# Patient Record
Sex: FEMALE | Race: ASIAN | Employment: UNEMPLOYED | URBAN - METROPOLITAN AREA
[De-identification: names, ages, dates, MRNs, and addresses within clinical notes are randomized per-mention and may not be internally consistent; named-entity substitution may affect disease eponyms.]

---

## 2017-01-03 ENCOUNTER — TELEPHONE (OUTPATIENT)
Dept: FAMILY MEDICINE CLINIC | Facility: CLINIC | Age: 52
End: 2017-01-03

## 2017-01-11 RX ORDER — MONTELUKAST SODIUM 10 MG/1
TABLET ORAL
Qty: 90 TABLET | Refills: 0 | Status: SHIPPED | OUTPATIENT
Start: 2017-01-11 | End: 2017-03-22

## 2017-01-13 NOTE — TELEPHONE ENCOUNTER
PA required for Esomeprazole. Called [phone], [phone]. Representative states no PA required and this med went through insurance 1-8-17. Task completed.

## 2017-02-18 ENCOUNTER — TELEPHONE (OUTPATIENT)
Dept: FAMILY MEDICINE CLINIC | Facility: CLINIC | Age: 52
End: 2017-02-18

## 2017-03-06 ENCOUNTER — HOSPITAL ENCOUNTER (OUTPATIENT)
Dept: GENERAL RADIOLOGY | Age: 52
Discharge: HOME | End: 2017-03-06
Attending: PHYSICIAN ASSISTANT
Payer: COMMERCIAL

## 2017-03-06 ENCOUNTER — LAB ENCOUNTER (OUTPATIENT)
Dept: LAB | Age: 52
End: 2017-03-06
Attending: PHYSICIAN ASSISTANT
Payer: COMMERCIAL

## 2017-03-06 ENCOUNTER — OFFICE VISIT (OUTPATIENT)
Dept: FAMILY MEDICINE CLINIC | Facility: CLINIC | Age: 52
End: 2017-03-06

## 2017-03-06 VITALS
TEMPERATURE: 98 F | DIASTOLIC BLOOD PRESSURE: 60 MMHG | HEIGHT: 65 IN | RESPIRATION RATE: 16 BRPM | BODY MASS INDEX: 28.66 KG/M2 | HEART RATE: 72 BPM | SYSTOLIC BLOOD PRESSURE: 112 MMHG | WEIGHT: 172 LBS

## 2017-03-06 DIAGNOSIS — R51.9 HEADACHE, UNSPECIFIED HEADACHE TYPE: ICD-10-CM

## 2017-03-06 DIAGNOSIS — M79.671 PAIN IN BOTH FEET: ICD-10-CM

## 2017-03-06 DIAGNOSIS — M79.672 PAIN IN BOTH FEET: ICD-10-CM

## 2017-03-06 DIAGNOSIS — Z13.820 SCREENING FOR OSTEOPOROSIS: ICD-10-CM

## 2017-03-06 DIAGNOSIS — N64.4 BREAST PAIN IN FEMALE: ICD-10-CM

## 2017-03-06 DIAGNOSIS — Z01.419 WELL WOMAN EXAM WITH ROUTINE GYNECOLOGICAL EXAM: Primary | ICD-10-CM

## 2017-03-06 DIAGNOSIS — M25.50 ARTHRALGIA, UNSPECIFIED JOINT: ICD-10-CM

## 2017-03-06 DIAGNOSIS — Z12.31 VISIT FOR SCREENING MAMMOGRAM: ICD-10-CM

## 2017-03-06 DIAGNOSIS — E83.40 MAGNESIUM DISORDER: ICD-10-CM

## 2017-03-06 DIAGNOSIS — J45.20 MILD INTERMITTENT ASTHMA WITHOUT COMPLICATION: ICD-10-CM

## 2017-03-06 DIAGNOSIS — Z00.00 LABORATORY EXAM ORDERED AS PART OF ROUTINE GENERAL MEDICAL EXAMINATION: ICD-10-CM

## 2017-03-06 DIAGNOSIS — E03.9 HYPOTHYROIDISM, UNSPECIFIED TYPE: ICD-10-CM

## 2017-03-06 DIAGNOSIS — E55.9 VITAMIN D DEFICIENCY: ICD-10-CM

## 2017-03-06 LAB
25-HYDROXYVITAMIN D (TOTAL): 40.3 NG/ML (ref 30–100)
ALBUMIN SERPL-MCNC: 3.7 G/DL (ref 3.5–4.8)
ALP LIVER SERPL-CCNC: 73 U/L (ref 41–108)
ALT SERPL-CCNC: 22 U/L (ref 14–54)
ANTI-THYROGLOBULIN: <15 U/ML (ref ?–60)
AST SERPL-CCNC: 18 U/L (ref 15–41)
BASOPHILS # BLD AUTO: 0.05 X10(3) UL (ref 0–0.1)
BASOPHILS NFR BLD AUTO: 1 %
BILIRUB SERPL-MCNC: 0.3 MG/DL (ref 0.1–2)
BUN BLD-MCNC: 15 MG/DL (ref 8–20)
C-REACTIVE PROTEIN: <0.29 MG/DL (ref ?–1)
CALCIUM BLD-MCNC: 9.3 MG/DL (ref 8.3–10.3)
CHLORIDE: 107 MMOL/L (ref 101–111)
CHOLEST SMN-MCNC: 177 MG/DL (ref ?–200)
CO2: 28 MMOL/L (ref 22–32)
CREAT BLD-MCNC: 0.78 MG/DL (ref 0.55–1.02)
EOSINOPHIL # BLD AUTO: 0.18 X10(3) UL (ref 0–0.3)
EOSINOPHIL NFR BLD AUTO: 3.5 %
ERYTHROCYTE [DISTWIDTH] IN BLOOD BY AUTOMATED COUNT: 14 % (ref 11.5–16)
FREE T4: 1.3 NG/DL (ref 0.9–1.8)
GLUCOSE BLD-MCNC: 98 MG/DL (ref 70–99)
HAV IGM SER QL: 2.1 MG/DL (ref 1.7–3)
HCT VFR BLD AUTO: 37.5 % (ref 34–50)
HDLC SERPL-MCNC: 61 MG/DL (ref 45–?)
HDLC SERPL: 2.9 {RATIO} (ref ?–4.44)
HGB BLD-MCNC: 12.6 G/DL (ref 12–16)
IMMATURE GRANULOCYTE COUNT: 0.01 X10(3) UL (ref 0–1)
IMMATURE GRANULOCYTE RATIO %: 0.2 %
LDLC SERPL CALC-MCNC: 102 MG/DL (ref ?–130)
LYMPHOCYTES # BLD AUTO: 2.02 X10(3) UL (ref 0.9–4)
LYMPHOCYTES NFR BLD AUTO: 39.7 %
M PROTEIN MFR SERPL ELPH: 7.5 G/DL (ref 6.1–8.3)
MCH RBC QN AUTO: 28.9 PG (ref 27–33.2)
MCHC RBC AUTO-ENTMCNC: 33.6 G/DL (ref 31–37)
MCV RBC AUTO: 86 FL (ref 81–100)
MONOCYTES # BLD AUTO: 0.26 X10(3) UL (ref 0.1–0.6)
MONOCYTES NFR BLD AUTO: 5.1 %
NEUTROPHIL ABS PRELIM: 2.57 X10 (3) UL (ref 1.3–6.7)
NEUTROPHILS # BLD AUTO: 2.57 X10(3) UL (ref 1.3–6.7)
NEUTROPHILS NFR BLD AUTO: 50.5 %
NONHDLC SERPL-MCNC: 116 MG/DL (ref ?–130)
PLATELET # BLD AUTO: 242 10(3)UL (ref 150–450)
POTASSIUM SERPL-SCNC: 4.1 MMOL/L (ref 3.6–5.1)
RBC # BLD AUTO: 4.36 X10(6)UL (ref 3.8–5.1)
RED CELL DISTRIBUTION WIDTH-SD: 44.1 FL (ref 35.1–46.3)
SED RATE-ML: 16 MM/HR (ref 0–25)
SODIUM SERPL-SCNC: 141 MMOL/L (ref 136–144)
TRIGLYCERIDES: 71 MG/DL (ref ?–150)
TSI SER-ACNC: 1.01 MIU/ML (ref 0.35–5.5)
URIC ACID: 6.3 MG/DL (ref 2.4–8)
VLDL: 14 MG/DL (ref 5–40)
WBC # BLD AUTO: 5.1 X10(3) UL (ref 4–13)

## 2017-03-06 PROCEDURE — 73630 X-RAY EXAM OF FOOT: CPT

## 2017-03-06 PROCEDURE — 85025 COMPLETE CBC W/AUTO DIFF WBC: CPT

## 2017-03-06 PROCEDURE — 80061 LIPID PANEL: CPT

## 2017-03-06 PROCEDURE — 87624 HPV HI-RISK TYP POOLED RSLT: CPT | Performed by: PHYSICIAN ASSISTANT

## 2017-03-06 PROCEDURE — 86140 C-REACTIVE PROTEIN: CPT

## 2017-03-06 PROCEDURE — 84443 ASSAY THYROID STIM HORMONE: CPT

## 2017-03-06 PROCEDURE — 86225 DNA ANTIBODY NATIVE: CPT

## 2017-03-06 PROCEDURE — 80053 COMPREHEN METABOLIC PANEL: CPT

## 2017-03-06 PROCEDURE — 84550 ASSAY OF BLOOD/URIC ACID: CPT

## 2017-03-06 PROCEDURE — 36415 COLL VENOUS BLD VENIPUNCTURE: CPT

## 2017-03-06 PROCEDURE — 86235 NUCLEAR ANTIGEN ANTIBODY: CPT

## 2017-03-06 PROCEDURE — 82306 VITAMIN D 25 HYDROXY: CPT

## 2017-03-06 PROCEDURE — 99396 PREV VISIT EST AGE 40-64: CPT | Performed by: PHYSICIAN ASSISTANT

## 2017-03-06 PROCEDURE — 85652 RBC SED RATE AUTOMATED: CPT

## 2017-03-06 PROCEDURE — 86800 THYROGLOBULIN ANTIBODY: CPT

## 2017-03-06 PROCEDURE — 83735 ASSAY OF MAGNESIUM: CPT

## 2017-03-06 PROCEDURE — 84432 ASSAY OF THYROGLOBULIN: CPT

## 2017-03-06 PROCEDURE — 99214 OFFICE O/P EST MOD 30 MIN: CPT | Performed by: PHYSICIAN ASSISTANT

## 2017-03-06 PROCEDURE — 84439 ASSAY OF FREE THYROXINE: CPT

## 2017-03-06 PROCEDURE — 86038 ANTINUCLEAR ANTIBODIES: CPT

## 2017-03-06 NOTE — PROGRESS NOTES
HPI:   Rosas Valenzuela is a 45year old female who presents for a well woman exam. Symptoms: denies discharge, itching, burning or dysuria, is menopausal.    No LMP recorded. Patient is not currently having periods (Reason: Menopause).   Previous pap: 2 Rfl: 3   ESOMEPRAZOLE MAGNESIUM 20 MG Oral Capsule Delayed Release TAKE 1 CAPSULE BY MOUTH DAILY Disp: 90 capsule Rfl: 0   Cholecalciferol (VITAMIN D3) 3000 UNITS Oral Tab Take 1 capsule by mouth daily.  Disp:  Rfl:    MONTELUKAST SODIUM 10 MG Oral Tab TAKE Location: [facility]    INJECTION, W/WO CONTRAST, DX/THERAPEUTIC SUBSTANCE, EPIDURAL/SUBARACHNOID; LUMBAR/SACRAL N/A 2/1/2016    Comment Procedure: LUMBAR EPIDURAL;  Surgeon: Natacha Modi MD;  Location: [address] [place] [address] clear, ears and throat are clear  NECK: supple,no adenopathy,+thyromegaly  BREASTS: no retractions, no suspicious masses, no discharge or axillary lymphadenopathy, +tenderness left breast medial to areola/nipple  LUNGS: clear to auscultation, easy breathin Future    6. Pain in both feet  Check xrays today. Check uric acid. Discussed stretches of the plantar fascia. Wear supportive and properly fitting shoes. - Uric Acid, Serum [E]; Future  - XR FOOT, COMPLETE (MIN 3 VIEWS), RIGHT (CPT=73360);  Future  - X

## 2017-03-07 LAB — THYROGLOBULIN, TUMOR MARKER: <0.5 NG/ML (ref 1.7–55.6)

## 2017-03-08 LAB
ANA SCREEN: POSITIVE
HPV I/H RISK 1 DNA SPEC QL NAA+PROBE: NEGATIVE

## 2017-03-08 NOTE — PROGRESS NOTES
Quick Note:    I would let her know that all of her labs look perfect. Continue same thyroid hormone, vitamin D, and magnesium and follow up as planned .     6/8/2017 10:20 AM Susanna Hernandez MD [facility]    ______

## 2017-03-09 LAB
CENTROMERE AUTOAB: <100 AU/ML (ref ?–100)
DSDNA AUTOAB: <100 IU/ML (ref ?–100)
HISTONE AUTOAB: <100 AU/ML (ref ?–100)
JO-1 AUTOAB: <100 AU/ML (ref ?–100)
RNP AUTOAB: <100 AU/ML (ref ?–100)
SCL-70 AUTOAB: <100 AU/ML (ref ?–100)
SM AUTOAB (SMITH): <100 AU/ML (ref ?–100)
SSA AUTOAB: <100 AU/ML (ref ?–100)
SSB AUTOAB: <100 AU/ML (ref ?–100)

## 2017-03-09 NOTE — PROGRESS NOTES
Quick Note:    [phone] Cell. LVMTCB regarding Dr. Ian Mandujano result note.  Hours and number given.     ______

## 2017-03-10 ENCOUNTER — TELEPHONE (OUTPATIENT)
Dept: FAMILY MEDICINE CLINIC | Facility: CLINIC | Age: 52
End: 2017-03-10

## 2017-03-10 NOTE — PROGRESS NOTES
Quick Note:    [employer] [phone]  Patient informed of Dr. Delmy Lim note. Patient verbalized understanding and agrees.      ______

## 2017-03-16 ENCOUNTER — HOSPITAL ENCOUNTER (OUTPATIENT)
Dept: MAMMOGRAPHY | Age: 52
Discharge: HOME | End: 2017-03-16
Attending: PHYSICIAN ASSISTANT
Payer: COMMERCIAL

## 2017-03-16 ENCOUNTER — HOSPITAL ENCOUNTER (OUTPATIENT)
Dept: ULTRASOUND IMAGING | Age: 52
Discharge: HOME | End: 2017-03-16
Attending: PHYSICIAN ASSISTANT
Payer: COMMERCIAL

## 2017-03-16 ENCOUNTER — HOSPITAL ENCOUNTER (OUTPATIENT)
Dept: BONE DENSITY | Age: 52
Discharge: HOME | End: 2017-03-16
Attending: PHYSICIAN ASSISTANT
Payer: COMMERCIAL

## 2017-03-16 DIAGNOSIS — N64.4 BREAST PAIN IN FEMALE: ICD-10-CM

## 2017-03-16 DIAGNOSIS — Z13.820 SCREENING FOR OSTEOPOROSIS: ICD-10-CM

## 2017-03-16 PROCEDURE — 77066 DX MAMMO INCL CAD BI: CPT

## 2017-03-16 PROCEDURE — 77080 DXA BONE DENSITY AXIAL: CPT

## 2017-03-16 PROCEDURE — 76641 ULTRASOUND BREAST COMPLETE: CPT

## 2017-03-16 PROCEDURE — 77062 BREAST TOMOSYNTHESIS BI: CPT

## 2017-03-22 ENCOUNTER — OFFICE VISIT (OUTPATIENT)
Dept: FAMILY MEDICINE CLINIC | Facility: CLINIC | Age: 52
End: 2017-03-22

## 2017-03-22 ENCOUNTER — HOSPITAL ENCOUNTER (OUTPATIENT)
Dept: GENERAL RADIOLOGY | Age: 52
Discharge: HOME | End: 2017-03-22
Attending: FAMILY MEDICINE
Payer: COMMERCIAL

## 2017-03-22 VITALS
BODY MASS INDEX: 26 KG/M2 | WEIGHT: 158 LBS | OXYGEN SATURATION: 99 % | RESPIRATION RATE: 16 BRPM | DIASTOLIC BLOOD PRESSURE: 68 MMHG | HEART RATE: 85 BPM | TEMPERATURE: 99 F | SYSTOLIC BLOOD PRESSURE: 126 MMHG

## 2017-03-22 DIAGNOSIS — S86.892A SHIN SPLINT, LEFT, INITIAL ENCOUNTER: ICD-10-CM

## 2017-03-22 DIAGNOSIS — J45.31 MILD PERSISTENT ASTHMA WITH ACUTE EXACERBATION: Primary | ICD-10-CM

## 2017-03-22 DIAGNOSIS — J45.31 MILD PERSISTENT ASTHMA WITH ACUTE EXACERBATION: ICD-10-CM

## 2017-03-22 PROCEDURE — 71020 XR CHEST PA + LAT CHEST (CPT=71020): CPT

## 2017-03-22 PROCEDURE — 99213 OFFICE O/P EST LOW 20 MIN: CPT | Performed by: FAMILY MEDICINE

## 2017-03-22 RX ORDER — MONTELUKAST SODIUM 10 MG/1
TABLET ORAL
Qty: 90 TABLET | Refills: 0 | Status: SHIPPED | OUTPATIENT
Start: 2017-03-22 | End: 2017-06-16

## 2017-03-22 RX ORDER — MONTELUKAST SODIUM 10 MG/1
TABLET ORAL
Qty: 90 TABLET | Refills: 0 | Status: SHIPPED | OUTPATIENT
Start: 2017-03-22 | End: 2017-03-22

## 2017-03-22 NOTE — PROGRESS NOTES
HPI:    Patient ID: Carisa Jaffe is a 45year old female. Leg Pain   Pain location: left ant shin. This is a chronic problem. The current episode started 1 to 4 weeks ago. There has been no history of extremity trauma. The problem occurs daily.  Jeannette Leach ESOMEPRAZOLE MAGNESIUM 20 MG Oral Capsule Delayed Release TAKE 1 CAPSULE BY MOUTH DAILY Disp: 90 capsule Rfl: 0   Cholecalciferol (VITAMIN D3) 3000 UNITS Oral Tab Take 1 capsule by mouth daily.  Disp:  Rfl:      Allergies:  Naproxen                Rash  N stretches, PT if not resolved. No orders of the defined types were placed in this encounter.        Meds This Visit:  Signed Prescriptions Disp Refills    Montelukast Sodium 10 MG Oral Tab 90 tablet 0      Sig: TAKE 1 TABLET(10 MG) BY MOUTH DAILY

## 2017-06-16 RX ORDER — MONTELUKAST SODIUM 10 MG/1
TABLET ORAL
Qty: 90 TABLET | Refills: 0 | Status: SHIPPED | OUTPATIENT
Start: 2017-06-16 | End: 2018-03-20

## 2017-06-23 ENCOUNTER — OFFICE VISIT (OUTPATIENT)
Dept: FAMILY MEDICINE CLINIC | Facility: CLINIC | Age: 52
End: 2017-06-23

## 2017-06-23 ENCOUNTER — TELEPHONE (OUTPATIENT)
Dept: FAMILY MEDICINE CLINIC | Facility: CLINIC | Age: 52
End: 2017-06-23

## 2017-06-23 VITALS
SYSTOLIC BLOOD PRESSURE: 112 MMHG | WEIGHT: 178 LBS | DIASTOLIC BLOOD PRESSURE: 60 MMHG | TEMPERATURE: 98 F | HEIGHT: 66 IN | OXYGEN SATURATION: 98 % | HEART RATE: 73 BPM | RESPIRATION RATE: 16 BRPM | BODY MASS INDEX: 28.61 KG/M2

## 2017-06-23 DIAGNOSIS — N64.4 BREAST PAIN: ICD-10-CM

## 2017-06-23 DIAGNOSIS — H93.13 TINNITUS, BILATERAL: ICD-10-CM

## 2017-06-23 DIAGNOSIS — N95.0 POSTMENOPAUSAL BLEEDING: ICD-10-CM

## 2017-06-23 DIAGNOSIS — H69.83 EUSTACHIAN TUBE DYSFUNCTION, BILATERAL: ICD-10-CM

## 2017-06-23 DIAGNOSIS — N89.8 VAGINAL DISCHARGE: ICD-10-CM

## 2017-06-23 DIAGNOSIS — R53.83 OTHER FATIGUE: ICD-10-CM

## 2017-06-23 DIAGNOSIS — R30.0 DYSURIA: ICD-10-CM

## 2017-06-23 DIAGNOSIS — R59.1 LYMPHADENOPATHY OF HEAD AND NECK: Primary | ICD-10-CM

## 2017-06-23 PROCEDURE — 87800 DETECT AGNT MULT DNA DIREC: CPT | Performed by: PHYSICIAN ASSISTANT

## 2017-06-23 PROCEDURE — 87086 URINE CULTURE/COLONY COUNT: CPT | Performed by: PHYSICIAN ASSISTANT

## 2017-06-23 PROCEDURE — 99214 OFFICE O/P EST MOD 30 MIN: CPT | Performed by: PHYSICIAN ASSISTANT

## 2017-06-23 RX ORDER — METRONIDAZOLE 7.5 MG/G
1 GEL VAGINAL NIGHTLY
Qty: 1 TUBE | Refills: 0 | Status: SHIPPED | OUTPATIENT
Start: 2017-06-23 | End: 2017-06-29

## 2017-06-23 RX ORDER — NITROFURANTOIN 25; 75 MG/1; MG/1
100 CAPSULE ORAL 2 TIMES DAILY
Qty: 14 CAPSULE | Refills: 0 | Status: SHIPPED | OUTPATIENT
Start: 2017-06-23 | End: 2017-06-23

## 2017-06-23 RX ORDER — CIPROFLOXACIN 250 MG/1
250 TABLET, FILM COATED ORAL 2 TIMES DAILY
Qty: 14 TABLET | Refills: 0 | Status: SHIPPED | OUTPATIENT
Start: 2017-06-23 | End: 2017-07-20

## 2017-06-23 NOTE — PROGRESS NOTES
HPI:   Oren Vega is a 45year old female who presents for swollen glands for  2  weeks. Has mild pain at the current time. Denies fever or chills. Denies sore throat. +bilateral ear pain and ear pressure; ears feel blocked.  Takes singulair for al Abdominal pain, unspecified site    • Acute maxillary sinusitis    • Pain in joint, ankle and foot    • Candidiasis of unspecified site    • Unspecified otitis media    • Chronic pain syndrome    • Reflux esophagitis    • Contact dermatitis and other eczem cough  CV: normal S1S2, RRR without murmur  GI: BS x 4, no tenderness  : no external sores or lesions, +small amount thin yellow discharge with odor, cervix is pink, culture was done  MS: no CVA tenderness bilaterally     ASSESSMENT AND PLAN:   1.  Arnaldo Freiberg

## 2017-06-28 ENCOUNTER — HOSPITAL ENCOUNTER (OUTPATIENT)
Dept: ULTRASOUND IMAGING | Age: 52
Discharge: HOME | End: 2017-06-28
Attending: PHYSICIAN ASSISTANT
Payer: COMMERCIAL

## 2017-06-28 DIAGNOSIS — R59.1 LYMPHADENOPATHY OF HEAD AND NECK: ICD-10-CM

## 2017-06-28 PROCEDURE — 76536 US EXAM OF HEAD AND NECK: CPT | Performed by: PHYSICIAN ASSISTANT

## 2017-06-29 DIAGNOSIS — N89.8 VAGINAL DISCHARGE: ICD-10-CM

## 2017-06-29 RX ORDER — METRONIDAZOLE 7.5 MG/G
1 GEL VAGINAL NIGHTLY
Qty: 70 G | Refills: 0 | Status: SHIPPED | OUTPATIENT
Start: 2017-06-29 | End: 2017-07-04

## 2017-07-05 ENCOUNTER — LABORATORY ENCOUNTER (OUTPATIENT)
Dept: LAB | Age: 52
End: 2017-07-05
Attending: INTERNAL MEDICINE
Payer: COMMERCIAL

## 2017-07-05 DIAGNOSIS — E61.2 MAGNESIUM DEFICIENCY: ICD-10-CM

## 2017-07-05 DIAGNOSIS — E55.9 VITAMIN D DEFICIENCY: ICD-10-CM

## 2017-07-05 DIAGNOSIS — E03.9 HYPOTHYROIDISM, UNSPECIFIED TYPE: ICD-10-CM

## 2017-07-05 LAB
25-HYDROXYVITAMIN D (TOTAL): 35.7 NG/ML (ref 30–100)
FREE T4: 1.1 NG/DL (ref 0.9–1.8)
HAV IGM SER QL: 2.1 MG/DL (ref 1.7–3)
TSI SER-ACNC: 1.95 MIU/ML (ref 0.35–5.5)

## 2017-07-05 PROCEDURE — 84432 ASSAY OF THYROGLOBULIN: CPT

## 2017-07-05 PROCEDURE — 36415 COLL VENOUS BLD VENIPUNCTURE: CPT

## 2017-07-05 PROCEDURE — 86800 THYROGLOBULIN ANTIBODY: CPT

## 2017-07-20 PROCEDURE — 87102 FUNGUS ISOLATION CULTURE: CPT | Performed by: OBSTETRICS & GYNECOLOGY

## 2017-09-13 ENCOUNTER — LAB ENCOUNTER (OUTPATIENT)
Dept: LAB | Age: 52
End: 2017-09-13
Attending: FAMILY MEDICINE
Payer: COMMERCIAL

## 2017-09-13 ENCOUNTER — OFFICE VISIT (OUTPATIENT)
Dept: FAMILY MEDICINE CLINIC | Facility: CLINIC | Age: 52
End: 2017-09-13

## 2017-09-13 VITALS
HEART RATE: 91 BPM | WEIGHT: 176 LBS | DIASTOLIC BLOOD PRESSURE: 74 MMHG | OXYGEN SATURATION: 98 % | SYSTOLIC BLOOD PRESSURE: 118 MMHG | BODY MASS INDEX: 28.28 KG/M2 | HEIGHT: 66 IN | TEMPERATURE: 98 F | RESPIRATION RATE: 16 BRPM

## 2017-09-13 DIAGNOSIS — R23.8 EASY BRUISING: ICD-10-CM

## 2017-09-13 DIAGNOSIS — R20.2 NUMBNESS AND TINGLING: ICD-10-CM

## 2017-09-13 DIAGNOSIS — Z86.2 HISTORY OF ANEMIA: ICD-10-CM

## 2017-09-13 DIAGNOSIS — R20.0 NUMBNESS AND TINGLING: ICD-10-CM

## 2017-09-13 DIAGNOSIS — Z86.2 HISTORY OF ANEMIA: Primary | ICD-10-CM

## 2017-09-13 LAB
APTT PPP: 32.6 SECONDS (ref 25–34)
BASOPHILS # BLD AUTO: 0.03 X10(3) UL (ref 0–0.1)
BASOPHILS NFR BLD AUTO: 0.5 %
DEPRECATED HBV CORE AB SER IA-ACNC: 24.9 NG/ML (ref 10–291)
EOSINOPHIL # BLD AUTO: 0.11 X10(3) UL (ref 0–0.3)
EOSINOPHIL NFR BLD AUTO: 1.7 %
ERYTHROCYTE [DISTWIDTH] IN BLOOD BY AUTOMATED COUNT: 13.8 % (ref 11.5–16)
HAV AB SERPL IA-ACNC: 678 PG/ML (ref 193–986)
HCT VFR BLD AUTO: 38.9 % (ref 34–50)
HGB BLD-MCNC: 13.1 G/DL (ref 12–16)
IMMATURE GRANULOCYTE COUNT: 0.02 X10(3) UL (ref 0–1)
IMMATURE GRANULOCYTE RATIO %: 0.3 %
INR BLD: 1.02 (ref 0.89–1.11)
LYMPHOCYTES # BLD AUTO: 1.5 X10(3) UL (ref 0.9–4)
LYMPHOCYTES NFR BLD AUTO: 23.6 %
MCH RBC QN AUTO: 29.2 PG (ref 27–33.2)
MCHC RBC AUTO-ENTMCNC: 33.7 G/DL (ref 31–37)
MCV RBC AUTO: 86.6 FL (ref 81–100)
MONOCYTES # BLD AUTO: 0.24 X10(3) UL (ref 0.1–0.6)
MONOCYTES NFR BLD AUTO: 3.8 %
NEUTROPHIL ABS PRELIM: 4.45 X10 (3) UL (ref 1.3–6.7)
NEUTROPHILS # BLD AUTO: 4.45 X10(3) UL (ref 1.3–6.7)
NEUTROPHILS NFR BLD AUTO: 70.1 %
PLATELET # BLD AUTO: 234 10(3)UL (ref 150–450)
PSA SERPL DL<=0.01 NG/ML-MCNC: 13.4 SECONDS (ref 12–14.3)
RBC # BLD AUTO: 4.49 X10(6)UL (ref 3.8–5.1)
RED CELL DISTRIBUTION WIDTH-SD: 43.8 FL (ref 35.1–46.3)
T PALLIDUM AB SER QL IA: NONREACTIVE
WBC # BLD AUTO: 6.4 X10(3) UL (ref 4–13)

## 2017-09-13 PROCEDURE — 85610 PROTHROMBIN TIME: CPT | Performed by: FAMILY MEDICINE

## 2017-09-13 PROCEDURE — 99214 OFFICE O/P EST MOD 30 MIN: CPT | Performed by: FAMILY MEDICINE

## 2017-09-13 PROCEDURE — 85025 COMPLETE CBC W/AUTO DIFF WBC: CPT | Performed by: FAMILY MEDICINE

## 2017-09-13 PROCEDURE — 86780 TREPONEMA PALLIDUM: CPT | Performed by: FAMILY MEDICINE

## 2017-09-13 PROCEDURE — 82607 VITAMIN B-12: CPT | Performed by: FAMILY MEDICINE

## 2017-09-13 PROCEDURE — 84425 ASSAY OF VITAMIN B-1: CPT | Performed by: FAMILY MEDICINE

## 2017-09-13 PROCEDURE — 85730 THROMBOPLASTIN TIME PARTIAL: CPT | Performed by: FAMILY MEDICINE

## 2017-09-13 PROCEDURE — 36415 COLL VENOUS BLD VENIPUNCTURE: CPT | Performed by: FAMILY MEDICINE

## 2017-09-13 PROCEDURE — 82728 ASSAY OF FERRITIN: CPT | Performed by: FAMILY MEDICINE

## 2017-09-15 NOTE — PROGRESS NOTES
HPI:    Patient ID: Gwyn Pederson is a 45year old female. Pt is feeling tired. States that she has hx of anemia. Wants an iron infusion. No heavy menses. Has easy bruising. Not taking oral iron. States easy bruising even without injury.   States and breath sounds normal. No respiratory distress. She has no wheezes. She has no rales. Abdominal: Soft. Bowel sounds are normal. She exhibits no distension and no mass. There is no tenderness. There is no rebound and no guarding.    Lymphadenopathy:

## 2017-09-16 LAB — VITAMIN B1 (THIAMINE), WHOLE B: 130 NMOL/L

## 2017-09-25 ENCOUNTER — OFFICE VISIT (OUTPATIENT)
Dept: FAMILY MEDICINE CLINIC | Facility: CLINIC | Age: 52
End: 2017-09-25

## 2017-09-25 VITALS
WEIGHT: 176 LBS | SYSTOLIC BLOOD PRESSURE: 90 MMHG | OXYGEN SATURATION: 98 % | DIASTOLIC BLOOD PRESSURE: 64 MMHG | TEMPERATURE: 98 F | HEIGHT: 66 IN | BODY MASS INDEX: 28.28 KG/M2 | RESPIRATION RATE: 18 BRPM | HEART RATE: 95 BPM

## 2017-09-25 DIAGNOSIS — M54.50 LOW BACK PAIN WITHOUT SCIATICA, UNSPECIFIED BACK PAIN LATERALITY, UNSPECIFIED CHRONICITY: Primary | ICD-10-CM

## 2017-09-25 DIAGNOSIS — G56.03 CARPAL TUNNEL SYNDROME, BILATERAL: ICD-10-CM

## 2017-09-25 DIAGNOSIS — M72.2 PLANTAR FASCIITIS: ICD-10-CM

## 2017-09-25 PROCEDURE — 99214 OFFICE O/P EST MOD 30 MIN: CPT | Performed by: FAMILY MEDICINE

## 2017-09-25 PROCEDURE — 81003 URINALYSIS AUTO W/O SCOPE: CPT | Performed by: FAMILY MEDICINE

## 2017-09-25 RX ORDER — CYCLOBENZAPRINE HCL 5 MG
5 TABLET ORAL 3 TIMES DAILY PRN
Qty: 30 TABLET | Refills: 1 | Status: SHIPPED | OUTPATIENT
Start: 2017-09-25 | End: 2018-02-20 | Stop reason: ALTCHOICE

## 2017-09-25 NOTE — PATIENT INSTRUCTIONS
Plantar Fasciitis  Plantar fasciitis is a painful swelling of the plantar fascia. The plantar fascia is a thick, fibrous layer of tissue. It covers the bones on the bottom of your foot. And it supports the foot bones in an arched position.   This can happ · First thing in the morning and before sports, stretch the bottom of your feet. Gently flex your ankle so the toes move toward your knee. · Icing may help control heel pain. Apply an ice pack to the heel for 10-20 minutes as a preventive.  Or ice your joseph Keep a neutral (straight) wrist position as often as you can. Don’t use your wrist in a bent (flexed) position for long periods of time. This includes extended or twisted positions.   Watch your   Don’t just use your thumb and index finger to grasp or l Carpal tunnel syndrome may occur during pregnancy and with use of birth control pills. It is more common in workers who must often bend their wrists. It is also common in people who work with power tools that cause strong vibrations.   Home care  · Rest the Call your healthcare provider right away if any of these occur:  · Pain not improving with the above treatment  · Fingers or hand become cold, blue, numb, or tingly  · Your whole arm becomes swollen or weak  Date Last Reviewed: 11/23/2015 © 2000-2016 The

## 2017-10-02 NOTE — PROGRESS NOTES
HPI:    Patient ID: Bailee Chavez is a 47year old female. Back Pain   This is a chronic problem. The current episode started more than 1 month ago. The problem occurs constantly. The problem is unchanged. The pain is present in the lumbar spine.  The q Prescriptions:  Cyclobenzaprine HCl 5 MG Oral Tab Take 1 tablet (5 mg total) by mouth 3 (three) times daily as needed for Muscle spasms.  Disp: 30 tablet Rfl: 1   ESOMEPRAZOLE MAGNESIUM 20 MG Oral Capsule Delayed Release TAKE ONE CAPSULE BY MOUTH EVERY DAY Vitals reviewed. ASSESSMENT/PLAN:   Low back pain without sciatica, unspecified back pain laterality, unspecified chronicity  (primary encounter diagnosis)  Plantar fasciitis  Carpal tunnel syndrome, bilateral      Follow up in 1 month    1.  Low

## 2017-10-12 ENCOUNTER — OFFICE VISIT (OUTPATIENT)
Dept: PHYSICAL THERAPY | Age: 52
End: 2017-10-12
Attending: FAMILY MEDICINE
Payer: COMMERCIAL

## 2017-10-12 DIAGNOSIS — M72.2 PLANTAR FASCIITIS: ICD-10-CM

## 2017-10-12 DIAGNOSIS — G56.03 CARPAL TUNNEL SYNDROME, BILATERAL: ICD-10-CM

## 2017-10-12 DIAGNOSIS — M54.50 LOW BACK PAIN WITHOUT SCIATICA, UNSPECIFIED BACK PAIN LATERALITY, UNSPECIFIED CHRONICITY: ICD-10-CM

## 2017-10-12 PROCEDURE — 97163 PT EVAL HIGH COMPLEX 45 MIN: CPT

## 2017-10-12 PROCEDURE — 97110 THERAPEUTIC EXERCISES: CPT

## 2017-10-17 ENCOUNTER — APPOINTMENT (OUTPATIENT)
Dept: PHYSICAL THERAPY | Age: 52
End: 2017-10-17
Attending: FAMILY MEDICINE
Payer: COMMERCIAL

## 2017-10-19 ENCOUNTER — OFFICE VISIT (OUTPATIENT)
Dept: PHYSICAL THERAPY | Age: 52
End: 2017-10-19
Attending: FAMILY MEDICINE
Payer: COMMERCIAL

## 2017-10-19 PROCEDURE — 97110 THERAPEUTIC EXERCISES: CPT

## 2017-10-19 PROCEDURE — 97112 NEUROMUSCULAR REEDUCATION: CPT

## 2017-10-19 PROCEDURE — 97140 MANUAL THERAPY 1/> REGIONS: CPT

## 2017-10-19 NOTE — PROGRESS NOTES
Dx: Plantar fasciitis (M72.2)  Low back pain without sciatica, unspecified back pain laterality, unspecified chronicity (M54.5)  Carpal tunnel syndrome, bilateral (G56.03)        Authorized # of Visits:  10         Next MD visit: none scheduled  Fall Risk:

## 2017-10-23 ENCOUNTER — OFFICE VISIT (OUTPATIENT)
Dept: PHYSICAL THERAPY | Age: 52
End: 2017-10-23
Attending: FAMILY MEDICINE
Payer: COMMERCIAL

## 2017-10-23 PROCEDURE — 97112 NEUROMUSCULAR REEDUCATION: CPT

## 2017-10-23 PROCEDURE — 97140 MANUAL THERAPY 1/> REGIONS: CPT

## 2017-10-23 PROCEDURE — 97110 THERAPEUTIC EXERCISES: CPT

## 2017-10-23 NOTE — PROGRESS NOTES
Dx: Plantar fasciitis (M72.2)  Low back pain without sciatica, unspecified back pain laterality, unspecified chronicity (M54.5)  Carpal tunnel syndrome, bilateral (G56.03)        Authorized # of Visits:  10         Next MD visit: none scheduled  Fall Risk: sciatic slider L/R x10 ea  Supine: 90/90 sciatic slider L/R x10 ea         Hkly: TrA 10 x 10 sec  Hkly:  TrA 10 x 10 sec        Prone on elbows 10x 2 sec  Prone press ups (1/2 way) 10x 2 sec         Prone over SB: alt LE lift x10 ea         L3-4-5 CPA Gr ll

## 2017-10-26 ENCOUNTER — APPOINTMENT (OUTPATIENT)
Dept: PHYSICAL THERAPY | Age: 52
End: 2017-10-26
Attending: FAMILY MEDICINE
Payer: COMMERCIAL

## 2017-10-31 ENCOUNTER — OFFICE VISIT (OUTPATIENT)
Dept: PHYSICAL THERAPY | Age: 52
End: 2017-10-31
Attending: FAMILY MEDICINE
Payer: COMMERCIAL

## 2017-10-31 PROCEDURE — 97112 NEUROMUSCULAR REEDUCATION: CPT

## 2017-10-31 PROCEDURE — 97140 MANUAL THERAPY 1/> REGIONS: CPT

## 2017-10-31 PROCEDURE — 97110 THERAPEUTIC EXERCISES: CPT

## 2017-10-31 NOTE — PROGRESS NOTES
Dx: Plantar fasciitis (M72.2)  Low back pain without sciatica, unspecified back pain laterality, unspecified chronicity (M54.5)  Carpal tunnel syndrome, bilateral (G56.03)        Authorized # of Visits:  10         Next MD visit: none scheduled  Fall Risk: lumbar extension 10x 2 sec  standing lumbar extension 10x 2 sec  standing lumbar extension 10x 2 sec       Supine: 90/90 sciatic slider L/R x10 ea  Supine: 90/90 sciatic slider L/R x10 ea  Supine: 90/90 sciatic slider L/R x10 ea        Hkly:  TrA 10 x 10 se

## 2017-11-02 ENCOUNTER — TELEPHONE (OUTPATIENT)
Dept: FAMILY MEDICINE CLINIC | Facility: CLINIC | Age: 52
End: 2017-11-02

## 2017-11-02 DIAGNOSIS — M54.2 NECK PAIN: Primary | ICD-10-CM

## 2017-11-06 ENCOUNTER — OFFICE VISIT (OUTPATIENT)
Dept: PHYSICAL THERAPY | Age: 52
End: 2017-11-06
Attending: FAMILY MEDICINE
Payer: COMMERCIAL

## 2017-11-06 PROCEDURE — 97112 NEUROMUSCULAR REEDUCATION: CPT

## 2017-11-06 PROCEDURE — 97110 THERAPEUTIC EXERCISES: CPT

## 2017-11-06 PROCEDURE — 97140 MANUAL THERAPY 1/> REGIONS: CPT

## 2017-11-06 NOTE — PROGRESS NOTES
Dx: Plantar fasciitis (M72.2)  Low back pain without sciatica, unspecified back pain laterality, unspecified chronicity (M54.5)  Carpal tunnel syndrome, bilateral (G56.03)        Authorized # of Visits:  10         Next MD visit: none scheduled  Fall Risk: Tiltboard AP/PA/M/L x10 ea  bilat soleus stretch 3x20 sec  bilat soleus stretch 3x20 sec  bilat soleus stretch 3x20 sec       bilat HR x10  bilat HR x10  bilat HR x15  bilat HR x15      L/R plantar fascia stretch off 6\" step 3x20 sec ea  L/R plantar fas

## 2017-11-09 ENCOUNTER — OFFICE VISIT (OUTPATIENT)
Dept: PHYSICAL THERAPY | Age: 52
End: 2017-11-09
Attending: FAMILY MEDICINE
Payer: COMMERCIAL

## 2017-11-09 PROCEDURE — 97162 PT EVAL MOD COMPLEX 30 MIN: CPT

## 2017-11-09 PROCEDURE — 97140 MANUAL THERAPY 1/> REGIONS: CPT

## 2017-11-09 NOTE — PROGRESS NOTES
SPINE EVALUATION:   Referring Physician: Dr. Monisha Mcallister  Diagnosis: Neck pain     Date of Service: 11/9/2017     PATIENT Ankur Baxter is a 47year old y/o female who presents to therapy today with complaints of neck and bilat shoulder/arm pain (L R  Rotation: R 50 deg, pain L; L 60 deg, pain R    Accessory motion: cervical spine: pain with CPA C4-5-6; thoracic spine: hypomobility throughout  Palpation: significant tenderness bilat upper trap    Strength:   UE/Scapular   Shoulder: 4+/5 bilat UE  Rho nature of her problem and improvement with cervical traction in past, I would recommend [place] to have a home cervical traction unit to manage her condition long term in conjunction with postural exercises.      Education or treatment limitation: None  Reha

## 2017-11-14 ENCOUNTER — APPOINTMENT (OUTPATIENT)
Dept: PHYSICAL THERAPY | Age: 52
End: 2017-11-14
Attending: FAMILY MEDICINE
Payer: COMMERCIAL

## 2017-11-20 ENCOUNTER — OFFICE VISIT (OUTPATIENT)
Dept: PHYSICAL THERAPY | Age: 52
End: 2017-11-20
Attending: FAMILY MEDICINE
Payer: COMMERCIAL

## 2017-11-20 PROCEDURE — 97140 MANUAL THERAPY 1/> REGIONS: CPT

## 2017-11-20 PROCEDURE — 97110 THERAPEUTIC EXERCISES: CPT

## 2017-11-20 NOTE — PROGRESS NOTES
Dx: neck pain         Authorized # of Visits:  10         Next MD visit: none scheduled  Fall Risk: standard         Precautions: n/a             Subjective: no new problems.   Reports that her lower back is doing well and she would like to focus todays akira

## 2017-11-27 ENCOUNTER — OFFICE VISIT (OUTPATIENT)
Dept: PHYSICAL THERAPY | Age: 52
End: 2017-11-27
Attending: FAMILY MEDICINE
Payer: COMMERCIAL

## 2017-11-27 PROCEDURE — 97110 THERAPEUTIC EXERCISES: CPT

## 2017-11-27 PROCEDURE — 97140 MANUAL THERAPY 1/> REGIONS: CPT

## 2017-11-27 NOTE — PROGRESS NOTES
Dx: neck pain         Authorized # of Visits:  10         Next MD visit: none scheduled  Fall Risk: standard         Precautions: n/a             Subjective: no new problems.   Reports that her neck is better overall but with cooking for Thanksgiving and th

## 2017-12-04 ENCOUNTER — APPOINTMENT (OUTPATIENT)
Dept: PHYSICAL THERAPY | Age: 52
End: 2017-12-04
Payer: COMMERCIAL

## 2017-12-05 ENCOUNTER — OFFICE VISIT (OUTPATIENT)
Dept: PHYSICAL THERAPY | Age: 52
End: 2017-12-05
Attending: FAMILY MEDICINE
Payer: COMMERCIAL

## 2017-12-05 PROCEDURE — 97110 THERAPEUTIC EXERCISES: CPT

## 2017-12-05 PROCEDURE — 97140 MANUAL THERAPY 1/> REGIONS: CPT

## 2017-12-05 NOTE — PROGRESS NOTES
Dx: neck pain         Authorized # of Visits:  10         Next MD visit: none scheduled  Fall Risk: standard         Precautions: n/a             Subjective: No new problems. Reports that she has been having good and bad days.   Only really feeling pain w mobilization Thrust        Supine: manual cervical traction, cervical PROM, UT stretches x15 min  Supine: manual cervical traction, cervical PROM, UT stretches x15 min  Supine: manual cervical traction, cervical PROM, UT stretches x10 min           Charges

## 2017-12-07 ENCOUNTER — APPOINTMENT (OUTPATIENT)
Dept: PHYSICAL THERAPY | Age: 52
End: 2017-12-07
Payer: COMMERCIAL

## 2017-12-08 ENCOUNTER — APPOINTMENT (OUTPATIENT)
Dept: PHYSICAL THERAPY | Age: 52
End: 2017-12-08
Attending: FAMILY MEDICINE
Payer: COMMERCIAL

## 2017-12-11 ENCOUNTER — OFFICE VISIT (OUTPATIENT)
Dept: PHYSICAL THERAPY | Age: 52
End: 2017-12-11
Attending: FAMILY MEDICINE
Payer: COMMERCIAL

## 2017-12-11 PROCEDURE — 97110 THERAPEUTIC EXERCISES: CPT

## 2017-12-11 PROCEDURE — 97140 MANUAL THERAPY 1/> REGIONS: CPT

## 2017-12-12 NOTE — PROGRESS NOTES
Dx: neck pain         Authorized # of Visits:  10         Next MD visit: none scheduled  Fall Risk: standard         Precautions: n/a             Subjective: No new problems.    Reports that the sharp pains into the arm have subsided but she is still having H abd YTB x10  bilat sh ER in neutral x10 YTB  bilat sh H abd YTB x10  bilat sh ER in neutral x10 YTB       R/L median nerve sliders x10 ea  Supine: R//L median nerve sliders x10 ea  Supine: R//L median nerve sliders x10 ea        Seated: IAS bilat upper

## 2017-12-14 ENCOUNTER — OFFICE VISIT (OUTPATIENT)
Dept: PHYSICAL THERAPY | Age: 52
End: 2017-12-14
Attending: FAMILY MEDICINE
Payer: COMMERCIAL

## 2017-12-14 PROCEDURE — 97110 THERAPEUTIC EXERCISES: CPT

## 2017-12-14 PROCEDURE — 97140 MANUAL THERAPY 1/> REGIONS: CPT

## 2017-12-14 NOTE — PROGRESS NOTES
Progress Summary    Pt has attended 6 visits in Physical Therapy. She reports feeling 70% improvement in neck/arm pain and function since starting therapy.   She reports that she still has pain into the left upper extremity when completing house work but o treatment options and has agreed to actively participate in planning and for this course of care. Thank you for your referral. If you have any questions, please contact me at Dept: [phone].     Sincerely,  Electronically signed by therapist: Claudia Martin Supine: R//L median nerve sliders x10 ea  Supine: R//L median nerve sliders x10 ea --       Seated: IASTM bilat upper trap x5 min  Seated: IASTM bilat upper trap x5 min  Seated: IASTM bilat upper trap x5 min       Prone: mid thoracic PA mobilization Thrust

## 2017-12-21 ENCOUNTER — OFFICE VISIT (OUTPATIENT)
Dept: PHYSICAL THERAPY | Age: 52
End: 2017-12-21
Attending: FAMILY MEDICINE
Payer: COMMERCIAL

## 2017-12-21 PROCEDURE — 97140 MANUAL THERAPY 1/> REGIONS: CPT

## 2017-12-21 PROCEDURE — 97110 THERAPEUTIC EXERCISES: CPT

## 2017-12-21 NOTE — PROGRESS NOTES
Dx: neck pain         Authorized # of Visits:  10         Next MD visit: none scheduled  Fall Risk: standard         Precautions: n/a             Subjective: No new problems.    Reports that she is having most of her pain in the upper trap area now and less neutral x10 YTB  bilat sh H abd YTB x10  bilat sh ER in neutral x10 YTB  bilat sh H abd YTB x10  bilat sh ER in neutral x10 YTB  bilat sh H abd YTB x10  bilat sh ER in neutral x10 YTB --  --     R/L median nerve sliders x10 ea  Supine: R//L median nerve sl

## 2017-12-28 ENCOUNTER — APPOINTMENT (OUTPATIENT)
Dept: PHYSICAL THERAPY | Age: 52
End: 2017-12-28
Attending: FAMILY MEDICINE
Payer: COMMERCIAL

## 2017-12-28 PROCEDURE — 97110 THERAPEUTIC EXERCISES: CPT

## 2017-12-28 PROCEDURE — 97140 MANUAL THERAPY 1/> REGIONS: CPT

## 2017-12-28 NOTE — PROGRESS NOTES
Dx: neck pain         Authorized # of Visits:  10         Next MD visit: none scheduled  Fall Risk: standard         Precautions: n/a             Subjective: No new problems. Reports that she bought a home traction unit but doesn't like it.   Used it one posture correction with arm slides x10  Back to wall: posture correction with arm slides x10  Back to wall: posture correction with arm slides x10  Back to wall: posture correction with arm slides x10   Scapular rows and ext RTB x15 ea  Scapular rows and e Time: 45 min

## 2018-01-02 ENCOUNTER — OFFICE VISIT (OUTPATIENT)
Dept: PHYSICAL THERAPY | Age: 53
End: 2018-01-02
Attending: FAMILY MEDICINE
Payer: COMMERCIAL

## 2018-01-02 PROCEDURE — 97110 THERAPEUTIC EXERCISES: CPT

## 2018-01-02 PROCEDURE — 97140 MANUAL THERAPY 1/> REGIONS: CPT

## 2018-01-02 NOTE — PROGRESS NOTES
Dx: neck pain         Authorized # of Visits:  10         Next MD visit: none scheduled  Fall Risk: standard         Precautions: n/a             Subjective: No new problems.    Reports that she has been using the traction daily and feeling better with uppe slides x10  Back to wall: posture correction with arm slides x10  Back to wall: posture correction with arm slides x10  Back to wall: posture correction with arm slides x10  Back to wall: posture correction with arm slides x10   Scapular rows and ext RTB x lateral glides L,  UT stretches x10 min  Supine: manual cervical traction, cervical PROM, C5-6 lateral glides L,  UT stretches x10 min  Supine: manual cervical traction, cervical PROM, C5-6 lateral glides L,  UT stretches x10 min       Charges: Ex 2 MT 1

## 2018-01-04 ENCOUNTER — APPOINTMENT (OUTPATIENT)
Dept: PHYSICAL THERAPY | Age: 53
End: 2018-01-04
Attending: FAMILY MEDICINE
Payer: COMMERCIAL

## 2018-01-04 ENCOUNTER — OFFICE VISIT (OUTPATIENT)
Dept: PHYSICAL THERAPY | Age: 53
End: 2018-01-04
Attending: FAMILY MEDICINE
Payer: COMMERCIAL

## 2018-01-04 PROCEDURE — 97140 MANUAL THERAPY 1/> REGIONS: CPT

## 2018-01-04 PROCEDURE — 97110 THERAPEUTIC EXERCISES: CPT

## 2018-01-04 NOTE — PROGRESS NOTES
Discharge Summary    Pt has attended 10 visits in Physical Therapy. Rosy reports feeling 80% improvement in pain and function since starting therapy. She reports that she is able to complete all ADL with minimal pain.   She reports that her left UE pa treatment options and has agreed to actively participate in planning and for this course of care. Thank you for your referral. Please co-sign or sign and return this letter via fax as soon as possible to [phone].  If you have any questions, please c wall: posture correction with arm slides x10  Back to wall: posture correction with arm slides x10  Back to wall: posture correction with arm slides x10  Back to wall: posture correction with arm slides x10  Back to wall: posture correction with arm slides traction, cervical PROM, UT stretches x10 min  Supine: manual cervical traction, cervical PROM, UT stretches x10 min  Supine: manual cervical traction, cervical PROM, C5-6 lateral glides L,  UT stretches x10 min  Supine: manual cervical traction, cervical

## 2018-01-08 ENCOUNTER — APPOINTMENT (OUTPATIENT)
Dept: PHYSICAL THERAPY | Age: 53
End: 2018-01-08
Attending: FAMILY MEDICINE
Payer: COMMERCIAL

## 2018-01-09 ENCOUNTER — LAB ENCOUNTER (OUTPATIENT)
Dept: LAB | Age: 53
End: 2018-01-09
Attending: INTERNAL MEDICINE
Payer: COMMERCIAL

## 2018-01-09 DIAGNOSIS — E03.9 HYPOTHYROIDISM, UNSPECIFIED TYPE: ICD-10-CM

## 2018-01-09 DIAGNOSIS — Z13.1 SCREENING FOR DIABETES MELLITUS: ICD-10-CM

## 2018-01-09 DIAGNOSIS — R79.0 ABNORMAL BLOOD LEVEL OF MAGNESIUM: ICD-10-CM

## 2018-01-09 DIAGNOSIS — E55.9 VITAMIN D DEFICIENCY: ICD-10-CM

## 2018-01-09 LAB
25-HYDROXYVITAMIN D (TOTAL): 22.1 NG/ML (ref 30–100)
EST. AVERAGE GLUCOSE BLD GHB EST-MCNC: 123 MG/DL (ref 68–126)
HAV IGM SER QL: 2.3 MG/DL (ref 1.7–3)
HBA1C MFR BLD HPLC: 5.9 % (ref ?–5.7)
TSI SER-ACNC: 2.86 MIU/ML (ref 0.35–5.5)

## 2018-01-09 PROCEDURE — 83036 HEMOGLOBIN GLYCOSYLATED A1C: CPT

## 2018-01-09 PROCEDURE — 83735 ASSAY OF MAGNESIUM: CPT

## 2018-01-09 PROCEDURE — 36415 COLL VENOUS BLD VENIPUNCTURE: CPT

## 2018-01-09 PROCEDURE — 82306 VITAMIN D 25 HYDROXY: CPT

## 2018-01-09 PROCEDURE — 84443 ASSAY THYROID STIM HORMONE: CPT

## 2018-01-19 PROBLEM — E61.2 MAGNESIUM DEFICIENCY: Status: ACTIVE | Noted: 2018-01-19

## 2018-01-19 PROBLEM — R73.03 PRE-DIABETES: Status: ACTIVE | Noted: 2018-01-19

## 2018-02-20 PROCEDURE — 87480 CANDIDA DNA DIR PROBE: CPT | Performed by: OBSTETRICS & GYNECOLOGY

## 2018-02-20 PROCEDURE — 87510 GARDNER VAG DNA DIR PROBE: CPT | Performed by: OBSTETRICS & GYNECOLOGY

## 2018-02-20 PROCEDURE — 87086 URINE CULTURE/COLONY COUNT: CPT | Performed by: OBSTETRICS & GYNECOLOGY

## 2018-02-20 PROCEDURE — 87660 TRICHOMONAS VAGIN DIR PROBE: CPT | Performed by: OBSTETRICS & GYNECOLOGY

## 2018-08-06 ENCOUNTER — LAB ENCOUNTER (OUTPATIENT)
Dept: LAB | Age: 53
End: 2018-08-06
Attending: INTERNAL MEDICINE
Payer: COMMERCIAL

## 2018-08-06 DIAGNOSIS — E03.9 HYPOTHYROIDISM, UNSPECIFIED TYPE: ICD-10-CM

## 2018-08-06 DIAGNOSIS — E61.2 MAGNESIUM DEFICIENCY: ICD-10-CM

## 2018-08-06 DIAGNOSIS — E55.9 VITAMIN D DEFICIENCY: ICD-10-CM

## 2018-08-06 DIAGNOSIS — R73.03 PRE-DIABETES: ICD-10-CM

## 2018-08-06 DIAGNOSIS — Z85.850 HX OF THYROID CANCER: ICD-10-CM

## 2018-08-06 LAB
EST. AVERAGE GLUCOSE BLD GHB EST-MCNC: 120 MG/DL (ref 68–126)
HAV IGM SER QL: 2.2 MG/DL (ref 1.8–2.5)
HBA1C MFR BLD HPLC: 5.8 % (ref ?–5.7)
TSI SER-ACNC: 1.93 MIU/ML (ref 0.35–5.5)
VIT D+METAB SERPL-MCNC: 29.2 NG/ML (ref 30–100)

## 2018-08-06 PROCEDURE — 84432 ASSAY OF THYROGLOBULIN: CPT

## 2018-08-06 PROCEDURE — 82306 VITAMIN D 25 HYDROXY: CPT

## 2018-08-06 PROCEDURE — 86800 THYROGLOBULIN ANTIBODY: CPT

## 2018-08-06 PROCEDURE — 36415 COLL VENOUS BLD VENIPUNCTURE: CPT

## 2018-08-06 PROCEDURE — 84443 ASSAY THYROID STIM HORMONE: CPT

## 2018-08-06 PROCEDURE — 83735 ASSAY OF MAGNESIUM: CPT

## 2018-08-06 PROCEDURE — 83036 HEMOGLOBIN GLYCOSYLATED A1C: CPT

## 2018-08-08 LAB
THYROGLOBULIN AB: <0.9 IU/ML
THYROGLOBULIN, SERUM OR PLASMA: <0.1 NG/ML

## 2018-08-13 ENCOUNTER — APPOINTMENT (OUTPATIENT)
Dept: LAB | Age: 53
End: 2018-08-13
Attending: FAMILY MEDICINE
Payer: COMMERCIAL

## 2018-08-13 ENCOUNTER — OFFICE VISIT (OUTPATIENT)
Dept: FAMILY MEDICINE CLINIC | Facility: CLINIC | Age: 53
End: 2018-08-13
Payer: COMMERCIAL

## 2018-08-13 VITALS
WEIGHT: 177 LBS | TEMPERATURE: 98 F | HEART RATE: 72 BPM | SYSTOLIC BLOOD PRESSURE: 116 MMHG | OXYGEN SATURATION: 99 % | DIASTOLIC BLOOD PRESSURE: 70 MMHG | RESPIRATION RATE: 18 BRPM | BODY MASS INDEX: 29 KG/M2

## 2018-08-13 DIAGNOSIS — M72.2 PLANTAR FASCIITIS: Primary | ICD-10-CM

## 2018-08-13 DIAGNOSIS — M19.049 ARTHRITIS PAIN, HAND: ICD-10-CM

## 2018-08-13 DIAGNOSIS — G56.03 BILATERAL CARPAL TUNNEL SYNDROME: ICD-10-CM

## 2018-08-13 DIAGNOSIS — M54.2 CHRONIC NECK PAIN: ICD-10-CM

## 2018-08-13 DIAGNOSIS — M54.12 CERVICAL RADICULOPATHY: ICD-10-CM

## 2018-08-13 DIAGNOSIS — G89.29 CHRONIC NECK PAIN: ICD-10-CM

## 2018-08-13 DIAGNOSIS — R51.9 HEADACHE DISORDER: ICD-10-CM

## 2018-08-13 LAB — URATE SERPL-MCNC: 5.7 MG/DL (ref 2.4–8)

## 2018-08-13 PROCEDURE — 84550 ASSAY OF BLOOD/URIC ACID: CPT | Performed by: FAMILY MEDICINE

## 2018-08-13 PROCEDURE — 36415 COLL VENOUS BLD VENIPUNCTURE: CPT | Performed by: FAMILY MEDICINE

## 2018-08-13 PROCEDURE — 99214 OFFICE O/P EST MOD 30 MIN: CPT | Performed by: FAMILY MEDICINE

## 2018-08-13 NOTE — PROGRESS NOTES
HPI:    Patient ID: Jagjit Clarke is a 47year old female. Foot Pain    Pain location: b/l heels. This is a new problem. Episode onset: 10 days ago. There has been no history of extremity trauma. The problem occurs constantly.  The problem has been [place] current episode started more than 1 year ago. There has been no history of extremity trauma. The problem occurs constantly. The problem has been unchanged. The quality of the pain is described as aching. The pain is mild.  The symptoms are aggravated by act encounter diagnosis)  Arthritis pain, hand  Chronic neck pain  Headache disorder  Cervical radiculopathy  Bilateral carpal tunnel syndrome     Pt was advised to start wearing more cushioning on her feet, stretching feet, and icing feet and fingers daily

## 2018-08-15 ENCOUNTER — TELEPHONE (OUTPATIENT)
Dept: FAMILY MEDICINE CLINIC | Facility: CLINIC | Age: 53
End: 2018-08-15

## 2018-08-15 NOTE — TELEPHONE ENCOUNTER
Please see attached message   Patient was in to see Kelly Pay on 08/13/18   Kelly Pay do you recall what medications patient is requesting? Please advise   Thank you.

## 2018-08-15 NOTE — TELEPHONE ENCOUNTER
Patient just saw Dr Rodney Camacho the other day and her prescriptions have not been sent to pharmacy. Please advise.

## 2018-08-17 NOTE — TELEPHONE ENCOUNTER
I dont recall sending her medications for her musculoskeletal pain. Just tylenol as needed. Unless she wants a narcotic like tramadol for pain, but its only for pain, it does not fix anything.

## 2018-08-21 ENCOUNTER — TELEPHONE (OUTPATIENT)
Dept: FAMILY MEDICINE CLINIC | Facility: CLINIC | Age: 53
End: 2018-08-21

## 2018-08-22 NOTE — TELEPHONE ENCOUNTER
Daughter is calling back and wants to know when the pain med will be called to the pharmacy and also wants lab results. I told her Dr. Rebecca Lundborg was not in the office today and tomorrow. She would like a call back.     [facility] Phone #

## 2018-08-22 NOTE — TELEPHONE ENCOUNTER
Dr. Herson Ku. Patient does want a medication for her pain. You mentioned tramadol.  Please advised what dose, amount, and direction you would like

## 2018-08-23 RX ORDER — TRAMADOL HYDROCHLORIDE 50 MG/1
50 TABLET ORAL EVERY 6 HOURS PRN
Refills: 0 | Status: CANCELLED | OUTPATIENT
Start: 2018-08-23

## 2018-08-23 RX ORDER — TRAMADOL HYDROCHLORIDE 50 MG/1
50 TABLET ORAL EVERY 6 HOURS PRN
Qty: 30 TABLET | Refills: 1 | Status: SHIPPED | OUTPATIENT
Start: 2018-08-23 | End: 2018-08-25

## 2018-08-24 ENCOUNTER — TELEPHONE (OUTPATIENT)
Dept: FAMILY MEDICINE CLINIC | Facility: CLINIC | Age: 53
End: 2018-08-24

## 2018-08-24 RX ORDER — ONDANSETRON 4 MG/1
4 TABLET, FILM COATED ORAL EVERY 8 HOURS PRN
Qty: 15 TABLET | Refills: 0 | Status: SHIPPED | OUTPATIENT
Start: 2018-08-24 | End: 2019-04-24 | Stop reason: ALTCHOICE

## 2018-08-24 NOTE — TELEPHONE ENCOUNTER
Pt took Tramadol last evening, woke this morning feeling dizzy. Vomited x1 about one hour ago,\"head feels heavy\". Has drank and eaten one bread since vomiting and so far as held it done. Daughter took BP: 106/82, Pulse 64.     Per YP: stop the Tramadol,

## 2018-08-25 ENCOUNTER — OFFICE VISIT (OUTPATIENT)
Dept: FAMILY MEDICINE CLINIC | Facility: CLINIC | Age: 53
End: 2018-08-25
Payer: COMMERCIAL

## 2018-08-25 VITALS
RESPIRATION RATE: 16 BRPM | BODY MASS INDEX: 29.14 KG/M2 | HEIGHT: 65.25 IN | SYSTOLIC BLOOD PRESSURE: 102 MMHG | TEMPERATURE: 99 F | DIASTOLIC BLOOD PRESSURE: 64 MMHG | OXYGEN SATURATION: 99 % | HEART RATE: 76 BPM | WEIGHT: 177 LBS

## 2018-08-25 DIAGNOSIS — G89.29 CHRONIC MIDLINE LOW BACK PAIN WITHOUT SCIATICA: ICD-10-CM

## 2018-08-25 DIAGNOSIS — R42 VERTIGO: Primary | ICD-10-CM

## 2018-08-25 DIAGNOSIS — M25.562 CHRONIC PAIN OF BOTH KNEES: ICD-10-CM

## 2018-08-25 DIAGNOSIS — M25.561 CHRONIC PAIN OF BOTH KNEES: ICD-10-CM

## 2018-08-25 DIAGNOSIS — G89.29 CHRONIC PAIN OF BOTH KNEES: ICD-10-CM

## 2018-08-25 DIAGNOSIS — M48.00 CENTRAL STENOSIS OF SPINAL CANAL: ICD-10-CM

## 2018-08-25 DIAGNOSIS — M54.50 CHRONIC MIDLINE LOW BACK PAIN WITHOUT SCIATICA: ICD-10-CM

## 2018-08-25 PROCEDURE — 99214 OFFICE O/P EST MOD 30 MIN: CPT | Performed by: FAMILY MEDICINE

## 2018-08-25 NOTE — PROGRESS NOTES
HPI:    Patient ID: Randeen Moritz is a 47year old female. Vertigo   This is a new problem. The current episode started yesterday. The problem occurs rarely. The problem has been resolved. Associated symptoms include nausea and vertigo.  Pertinent negat vertigo. All other systems reviewed and are negative. Current Outpatient Prescriptions:  Diclofenac Sodium 1.5 % Transdermal Solution Place 1 Application onto the skin 3 (three) times daily as needed.  Disp: 1 Bottle Rfl: 1   Ondansetron HCl ( MRI SPINE LUMBAR (CPT=72148); Future  - Diclofenac Sodium 1.5 % Transdermal Solution; Place 1 Application onto the skin 3 (three) times daily as needed. Dispense: 1 Bottle; Refill: 1    4.  Chronic pain of both knees  - Diclofenac Sodium 1.5 % Transdermal

## 2018-09-14 ENCOUNTER — TELEPHONE (OUTPATIENT)
Dept: FAMILY MEDICINE CLINIC | Facility: CLINIC | Age: 53
End: 2018-09-14

## 2018-09-14 NOTE — TELEPHONE ENCOUNTER
Pt wants to go for the lumbar and cervical spine together with sedation please advise.  She is afraid to go 2 times for them separately

## 2018-09-14 NOTE — TELEPHONE ENCOUNTER
Patient requesting both MRI's to be done at the same time and is seeking IV sedation. Aware Dr. Blanca Daley.

## 2018-09-17 NOTE — TELEPHONE ENCOUNTER
Left message for MRI dept to call back Tuesday. 1. Can patient have both MRI's at the same time? 2. For IV sedation does she need clearance by a physician?

## 2018-09-28 ENCOUNTER — HOSPITAL ENCOUNTER (OUTPATIENT)
Dept: MRI IMAGING | Facility: HOSPITAL | Age: 53
Discharge: HOME | End: 2018-09-28
Attending: FAMILY MEDICINE
Payer: COMMERCIAL

## 2018-10-09 ENCOUNTER — OFFICE VISIT (OUTPATIENT)
Dept: FAMILY MEDICINE CLINIC | Facility: CLINIC | Age: 53
End: 2018-10-09
Payer: COMMERCIAL

## 2018-10-09 VITALS
RESPIRATION RATE: 16 BRPM | DIASTOLIC BLOOD PRESSURE: 64 MMHG | WEIGHT: 177 LBS | HEART RATE: 91 BPM | HEIGHT: 65.25 IN | BODY MASS INDEX: 29.14 KG/M2 | OXYGEN SATURATION: 98 % | SYSTOLIC BLOOD PRESSURE: 98 MMHG | TEMPERATURE: 98 F

## 2018-10-09 DIAGNOSIS — M54.31 RIGHT SIDED SCIATICA: Primary | ICD-10-CM

## 2018-10-09 DIAGNOSIS — N89.8 VAGINAL IRRITATION: ICD-10-CM

## 2018-10-09 DIAGNOSIS — K64.8 INTERNAL HEMORRHOIDS: ICD-10-CM

## 2018-10-09 DIAGNOSIS — K59.09 OTHER CONSTIPATION: ICD-10-CM

## 2018-10-09 PROCEDURE — 99214 OFFICE O/P EST MOD 30 MIN: CPT | Performed by: FAMILY MEDICINE

## 2018-10-09 PROCEDURE — 87510 GARDNER VAG DNA DIR PROBE: CPT | Performed by: FAMILY MEDICINE

## 2018-10-09 PROCEDURE — 87660 TRICHOMONAS VAGIN DIR PROBE: CPT | Performed by: FAMILY MEDICINE

## 2018-10-09 PROCEDURE — 81003 URINALYSIS AUTO W/O SCOPE: CPT | Performed by: FAMILY MEDICINE

## 2018-10-09 PROCEDURE — 87086 URINE CULTURE/COLONY COUNT: CPT | Performed by: FAMILY MEDICINE

## 2018-10-09 PROCEDURE — 87480 CANDIDA DNA DIR PROBE: CPT | Performed by: FAMILY MEDICINE

## 2018-10-09 PROCEDURE — 87624 HPV HI-RISK TYP POOLED RSLT: CPT | Performed by: FAMILY MEDICINE

## 2018-10-09 NOTE — PROGRESS NOTES
HPI:   Khadar Tovar is a 46year old female here with back concerns, vaginal and rectal concerns    Pt reports it started 1-2 weeks ago   Pt reports the yeast vaginal feeling started one week  Pt wants urine checked too    Sacral pain with radiation to t 2/1/2016    Procedure: LUMBAR EPIDURAL;  Surgeon: Glenna Fry MD;  Location: [facility]   • INJECTION, ANESTHETIC/STEROID, TRANSFORAMINAL EPIDURAL; LUMBAR/SACRAL, SINGLE LEVEL Right 12/23/2015    Procedure: TRANSFORAMINAL EPIDURAL - exertion  CARDIOVASCULAR: denies chest pain on exertion  GI: denies abdominal pain,denies heartburn  NEURO: denies headaches  PSYCHE: denies depression or anxiety  HEMATOLOGIC: denies hx of anemia  ENDOCRINE:h/o thyroid cancer   ALL/ASTHMA: denies  asthma

## 2018-11-12 ENCOUNTER — TELEPHONE (OUTPATIENT)
Dept: FAMILY MEDICINE CLINIC | Facility: CLINIC | Age: 53
End: 2018-11-12

## 2018-11-12 DIAGNOSIS — M54.50 CHRONIC MIDLINE LOW BACK PAIN WITHOUT SCIATICA: ICD-10-CM

## 2018-11-12 DIAGNOSIS — M54.12 BRACHIAL NEURITIS: ICD-10-CM

## 2018-11-12 DIAGNOSIS — G89.29 CHRONIC THORACIC BACK PAIN, UNSPECIFIED BACK PAIN LATERALITY: Primary | ICD-10-CM

## 2018-11-12 DIAGNOSIS — G89.29 CHRONIC MIDLINE LOW BACK PAIN WITHOUT SCIATICA: ICD-10-CM

## 2018-11-12 DIAGNOSIS — M54.2 CERVICALGIA: ICD-10-CM

## 2018-11-12 DIAGNOSIS — R51.9 FACIAL PAIN: ICD-10-CM

## 2018-11-12 DIAGNOSIS — M72.2 PLANTAR FASCIAL FIBROMATOSIS: ICD-10-CM

## 2018-11-12 DIAGNOSIS — M19.049 PRIMARY LOCALIZED OSTEOARTHROSIS OF HAND, UNSPECIFIED LATERALITY: ICD-10-CM

## 2018-11-12 DIAGNOSIS — M48.00 CENTRAL STENOSIS OF SPINAL CANAL: ICD-10-CM

## 2018-11-12 DIAGNOSIS — G56.03 CARPAL TUNNEL SYNDROME, BILATERAL: ICD-10-CM

## 2018-11-12 DIAGNOSIS — G89.29 OTHER CHRONIC PAIN: ICD-10-CM

## 2018-11-12 DIAGNOSIS — M54.6 CHRONIC THORACIC BACK PAIN, UNSPECIFIED BACK PAIN LATERALITY: Primary | ICD-10-CM

## 2018-11-12 RX ORDER — ALPRAZOLAM 0.25 MG/1
0.25 TABLET ORAL NIGHTLY PRN
Qty: 2 TABLET | Refills: 0 | Status: SHIPPED | OUTPATIENT
Start: 2018-11-12 | End: 2018-11-30

## 2018-11-12 NOTE — TELEPHONE ENCOUNTER
Patient called and stated that YP put orders in for 2 different MRI's.     She would like it as one MRI from the neck down and she stated she would like to be sedated for the test.    She is very uncomfortable and nervous about having MRI's

## 2018-11-12 NOTE — TELEPHONE ENCOUNTER
Order for MRI lumbar and cervical spine given to pt,  Is there test for back MRI that could be ordered as one? Also pt requesting meds for MRI,  She is very nervous.

## 2018-11-12 NOTE — TELEPHONE ENCOUNTER
Pt states she does not want Xanax,  She wants to be totally sedated. Is this possible? Order pending, marked IV Sedation, is this okay? Approve. deny?

## 2018-11-12 NOTE — TELEPHONE ENCOUNTER
Ok to order whole spine MRI but then we have to wait for approval. If ordering whole spinr MRI use previous comments and reasons and also add on chronic thoracic back pain. I am sending xanax 0.25 1-2 tab before MRI.

## 2018-11-21 ENCOUNTER — TELEPHONE (OUTPATIENT)
Dept: FAMILY MEDICINE CLINIC | Facility: CLINIC | Age: 53
End: 2018-11-21

## 2018-11-21 NOTE — TELEPHONE ENCOUNTER
Lico Betancourt called from [facility] and stated patient is scheduled for a MRI under sedation. Explained patient needs a H&P . Procedure will be on December 4, 2018. Will call and schedule patient.

## 2018-11-23 ENCOUNTER — OFFICE VISIT (OUTPATIENT)
Dept: FAMILY MEDICINE CLINIC | Facility: CLINIC | Age: 53
End: 2018-11-23
Payer: COMMERCIAL

## 2018-11-23 ENCOUNTER — LAB ENCOUNTER (OUTPATIENT)
Dept: LAB | Age: 53
End: 2018-11-23
Attending: FAMILY MEDICINE
Payer: COMMERCIAL

## 2018-11-23 ENCOUNTER — APPOINTMENT (OUTPATIENT)
Dept: LAB | Age: 53
End: 2018-11-23
Attending: FAMILY MEDICINE
Payer: COMMERCIAL

## 2018-11-23 VITALS
TEMPERATURE: 97 F | HEIGHT: 66 IN | DIASTOLIC BLOOD PRESSURE: 82 MMHG | HEART RATE: 82 BPM | RESPIRATION RATE: 16 BRPM | WEIGHT: 176 LBS | OXYGEN SATURATION: 97 % | SYSTOLIC BLOOD PRESSURE: 122 MMHG | BODY MASS INDEX: 28.28 KG/M2

## 2018-11-23 DIAGNOSIS — Z01.818 PREOP TESTING: ICD-10-CM

## 2018-11-23 DIAGNOSIS — D50.8 OTHER IRON DEFICIENCY ANEMIA: ICD-10-CM

## 2018-11-23 DIAGNOSIS — Z01.818 PREOP EXAMINATION: Primary | ICD-10-CM

## 2018-11-23 DIAGNOSIS — G89.29 CHRONIC BILATERAL THORACIC BACK PAIN: ICD-10-CM

## 2018-11-23 DIAGNOSIS — E55.9 VITAMIN D DEFICIENCY: ICD-10-CM

## 2018-11-23 DIAGNOSIS — M54.6 CHRONIC BILATERAL THORACIC BACK PAIN: ICD-10-CM

## 2018-11-23 DIAGNOSIS — M54.2 CERVICAL PAIN: ICD-10-CM

## 2018-11-23 DIAGNOSIS — Z85.850 HX OF THYROID CANCER: ICD-10-CM

## 2018-11-23 DIAGNOSIS — E61.2 MAGNESIUM DEFICIENCY: ICD-10-CM

## 2018-11-23 DIAGNOSIS — Z01.818 PREOP EXAMINATION: ICD-10-CM

## 2018-11-23 DIAGNOSIS — D68.0 VON WILLEBRAND DISEASE (HCC): ICD-10-CM

## 2018-11-23 DIAGNOSIS — M54.50 LUMBAR PAIN: ICD-10-CM

## 2018-11-23 DIAGNOSIS — R73.03 PRE-DIABETES: ICD-10-CM

## 2018-11-23 DIAGNOSIS — E03.9 HYPOTHYROIDISM, UNSPECIFIED TYPE: ICD-10-CM

## 2018-11-23 DIAGNOSIS — J45.20 MILD INTERMITTENT ASTHMA WITHOUT COMPLICATION: ICD-10-CM

## 2018-11-23 PROCEDURE — 36415 COLL VENOUS BLD VENIPUNCTURE: CPT | Performed by: FAMILY MEDICINE

## 2018-11-23 PROCEDURE — 93010 ELECTROCARDIOGRAM REPORT: CPT | Performed by: INTERNAL MEDICINE

## 2018-11-23 PROCEDURE — 93005 ELECTROCARDIOGRAM TRACING: CPT

## 2018-11-23 PROCEDURE — 99243 OFF/OP CNSLTJ NEW/EST LOW 30: CPT | Performed by: FAMILY MEDICINE

## 2018-11-23 PROCEDURE — 80053 COMPREHEN METABOLIC PANEL: CPT | Performed by: FAMILY MEDICINE

## 2018-11-23 PROCEDURE — 85025 COMPLETE CBC W/AUTO DIFF WBC: CPT | Performed by: FAMILY MEDICINE

## 2018-11-23 RX ORDER — MONTELUKAST SODIUM 10 MG/1
TABLET ORAL
Qty: 90 TABLET | Refills: 0 | Status: SHIPPED | OUTPATIENT
Start: 2018-11-23 | End: 2019-02-14

## 2018-11-23 NOTE — PROGRESS NOTES
Georgia Way is a 46year old female who presents for preop clearance for MRI with sedation   [facility]  reqesting clearance. 12/4/18  At [facility]   HPI:   Pt complains of chronic neck and back pain.   Pt denies any chest pain/sob/dizzin gm PV qhs x 14 days, then 1 gm PV 2-3 x weekly Disp: 2 Tube Rfl: 4   Levothyroxine Sodium 88 MCG Oral Tab TAKE 1 TABLET BY MOUTH EVERY DAY AS DIRECTED Disp: 90 tablet Rfl: 3   ESOMEPRAZOLE MAGNESIUM 20 MG Oral Capsule Delayed Release TAKE ONE CAPSULE BY MO vision  HEENT: denies nasal congestion, sinus pain or ST  LUNGS: denies shortness of breath with exertion  CARDIOVASCULAR: denies chest pain on exertion  GI: denies abdominal pain,denies heartburn  : denies nocturia or changes in stream  NEURO: denies he COMP METABOLIC PANEL (14); Future    6. Other iron deficiency anemia    - EKG 12-LEAD; Future  - CBC WITH DIFFERENTIAL WITH PLATELET; Future  - COMP METABOLIC PANEL (14); Future    7. Hx of thyroid cancer    - EKG 12-LEAD;  Future  - CBC WITH DIFFERENTIAL W

## 2018-12-04 ENCOUNTER — HOSPITAL ENCOUNTER (OUTPATIENT)
Dept: MRI IMAGING | Facility: HOSPITAL | Age: 53
Discharge: HOME | End: 2018-12-04
Attending: FAMILY MEDICINE
Payer: COMMERCIAL

## 2018-12-04 ENCOUNTER — ANESTHESIA EVENT (OUTPATIENT)
Dept: MRI IMAGING | Facility: HOSPITAL | Age: 53
End: 2018-12-04
Payer: COMMERCIAL

## 2018-12-04 ENCOUNTER — ANESTHESIA (OUTPATIENT)
Dept: MRI IMAGING | Facility: HOSPITAL | Age: 53
End: 2018-12-04
Payer: COMMERCIAL

## 2018-12-04 VITALS
TEMPERATURE: 97 F | BODY MASS INDEX: 27.48 KG/M2 | HEART RATE: 60 BPM | WEIGHT: 171 LBS | RESPIRATION RATE: 17 BRPM | DIASTOLIC BLOOD PRESSURE: 67 MMHG | SYSTOLIC BLOOD PRESSURE: 104 MMHG | OXYGEN SATURATION: 100 % | HEIGHT: 66 IN

## 2018-12-04 DIAGNOSIS — M54.12 BRACHIAL NEURITIS: ICD-10-CM

## 2018-12-04 DIAGNOSIS — M54.6 CHRONIC THORACIC BACK PAIN, UNSPECIFIED BACK PAIN LATERALITY: ICD-10-CM

## 2018-12-04 DIAGNOSIS — G89.29 OTHER CHRONIC PAIN: ICD-10-CM

## 2018-12-04 DIAGNOSIS — G56.03 CARPAL TUNNEL SYNDROME, BILATERAL: ICD-10-CM

## 2018-12-04 DIAGNOSIS — M48.00 CENTRAL STENOSIS OF SPINAL CANAL: ICD-10-CM

## 2018-12-04 DIAGNOSIS — M54.50 CHRONIC MIDLINE LOW BACK PAIN WITHOUT SCIATICA: ICD-10-CM

## 2018-12-04 DIAGNOSIS — M19.049 PRIMARY LOCALIZED OSTEOARTHROSIS OF HAND, UNSPECIFIED LATERALITY: ICD-10-CM

## 2018-12-04 DIAGNOSIS — M54.2 CERVICALGIA: ICD-10-CM

## 2018-12-04 DIAGNOSIS — R51.9 FACIAL PAIN: ICD-10-CM

## 2018-12-04 DIAGNOSIS — G89.29 CHRONIC THORACIC BACK PAIN, UNSPECIFIED BACK PAIN LATERALITY: ICD-10-CM

## 2018-12-04 DIAGNOSIS — G89.29 CHRONIC MIDLINE LOW BACK PAIN WITHOUT SCIATICA: ICD-10-CM

## 2018-12-04 DIAGNOSIS — M72.2 PLANTAR FASCIAL FIBROMATOSIS: ICD-10-CM

## 2018-12-04 PROCEDURE — 99214 OFFICE O/P EST MOD 30 MIN: CPT | Performed by: HOSPITALIST

## 2018-12-04 RX ORDER — NALOXONE HYDROCHLORIDE 0.4 MG/ML
80 INJECTION, SOLUTION INTRAMUSCULAR; INTRAVENOUS; SUBCUTANEOUS AS NEEDED
Status: ACTIVE | OUTPATIENT
Start: 2018-12-04 | End: 2018-12-04

## 2018-12-04 RX ORDER — METOCLOPRAMIDE 10 MG/1
10 TABLET ORAL ONCE
Status: DISCONTINUED | OUTPATIENT
Start: 2018-12-04 | End: 2018-12-06

## 2018-12-04 RX ORDER — LIDOCAINE HYDROCHLORIDE 10 MG/ML
INJECTION, SOLUTION EPIDURAL; INFILTRATION; INTRACAUDAL; PERINEURAL AS NEEDED
Status: DISCONTINUED | OUTPATIENT
Start: 2018-12-04 | End: 2018-12-04 | Stop reason: SURG

## 2018-12-04 RX ORDER — SODIUM CHLORIDE, SODIUM LACTATE, POTASSIUM CHLORIDE, CALCIUM CHLORIDE 600; 310; 30; 20 MG/100ML; MG/100ML; MG/100ML; MG/100ML
INJECTION, SOLUTION INTRAVENOUS CONTINUOUS
Status: DISCONTINUED | OUTPATIENT
Start: 2018-12-04 | End: 2018-12-06

## 2018-12-04 RX ORDER — ONDANSETRON 2 MG/ML
4 INJECTION INTRAMUSCULAR; INTRAVENOUS ONCE AS NEEDED
Status: ACTIVE | OUTPATIENT
Start: 2018-12-04 | End: 2018-12-04

## 2018-12-04 RX ORDER — FAMOTIDINE 20 MG/1
20 TABLET ORAL ONCE
Status: DISCONTINUED | OUTPATIENT
Start: 2018-12-04 | End: 2018-12-06

## 2018-12-04 RX ORDER — HYDROCODONE BITARTRATE AND ACETAMINOPHEN 5; 325 MG/1; MG/1
1 TABLET ORAL AS NEEDED
Status: DISCONTINUED | OUTPATIENT
Start: 2018-12-04 | End: 2018-12-06

## 2018-12-04 RX ORDER — MORPHINE SULFATE 2 MG/ML
2 INJECTION, SOLUTION INTRAMUSCULAR; INTRAVENOUS EVERY 10 MIN PRN
Status: DISCONTINUED | OUTPATIENT
Start: 2018-12-04 | End: 2018-12-06

## 2018-12-04 RX ORDER — HALOPERIDOL 5 MG/ML
0.25 INJECTION INTRAMUSCULAR ONCE AS NEEDED
Status: ACTIVE | OUTPATIENT
Start: 2018-12-04 | End: 2018-12-04

## 2018-12-04 RX ORDER — MORPHINE SULFATE 10 MG/ML
6 INJECTION, SOLUTION INTRAMUSCULAR; INTRAVENOUS EVERY 10 MIN PRN
Status: DISCONTINUED | OUTPATIENT
Start: 2018-12-04 | End: 2018-12-06

## 2018-12-04 RX ORDER — MORPHINE SULFATE 4 MG/ML
4 INJECTION, SOLUTION INTRAMUSCULAR; INTRAVENOUS EVERY 10 MIN PRN
Status: DISCONTINUED | OUTPATIENT
Start: 2018-12-04 | End: 2018-12-06

## 2018-12-04 RX ORDER — HYDROCODONE BITARTRATE AND ACETAMINOPHEN 5; 325 MG/1; MG/1
2 TABLET ORAL AS NEEDED
Status: DISCONTINUED | OUTPATIENT
Start: 2018-12-04 | End: 2018-12-06

## 2018-12-04 RX ORDER — ACETAMINOPHEN 500 MG
1000 TABLET ORAL ONCE
Status: DISCONTINUED | OUTPATIENT
Start: 2018-12-04 | End: 2018-12-06

## 2018-12-04 RX ADMIN — SODIUM CHLORIDE, SODIUM LACTATE, POTASSIUM CHLORIDE, CALCIUM CHLORIDE: 600; 310; 30; 20 INJECTION, SOLUTION INTRAVENOUS at 15:25:00

## 2018-12-04 RX ADMIN — SODIUM CHLORIDE, SODIUM LACTATE, POTASSIUM CHLORIDE, CALCIUM CHLORIDE: 600; 310; 30; 20 INJECTION, SOLUTION INTRAVENOUS at 13:55:00

## 2018-12-04 RX ADMIN — SODIUM CHLORIDE, SODIUM LACTATE, POTASSIUM CHLORIDE, CALCIUM CHLORIDE: 600; 310; 30; 20 INJECTION, SOLUTION INTRAVENOUS at 13:03:00

## 2018-12-04 RX ADMIN — SODIUM CHLORIDE, SODIUM LACTATE, POTASSIUM CHLORIDE, CALCIUM CHLORIDE: 600; 310; 30; 20 INJECTION, SOLUTION INTRAVENOUS at 14:25:00

## 2018-12-04 RX ADMIN — SODIUM CHLORIDE, SODIUM LACTATE, POTASSIUM CHLORIDE, CALCIUM CHLORIDE: 600; 310; 30; 20 INJECTION, SOLUTION INTRAVENOUS at 13:43:00

## 2018-12-04 RX ADMIN — SODIUM CHLORIDE, SODIUM LACTATE, POTASSIUM CHLORIDE, CALCIUM CHLORIDE: 600; 310; 30; 20 INJECTION, SOLUTION INTRAVENOUS at 14:45:00

## 2018-12-04 RX ADMIN — LIDOCAINE HYDROCHLORIDE 25 MG: 10 INJECTION, SOLUTION EPIDURAL; INFILTRATION; INTRACAUDAL; PERINEURAL at 13:45:00

## 2018-12-04 NOTE — H&P
[facility]    History & Physical    Amatul Monica Wylie Patient Status:  Outpatient    1965 MRN [id number]   Location [facility] MRI Attending Dionicio Villanueva DO   Hosp Day # 0 PCP Neelam Bridges DO     Date:  2018  Date o Ibuprofen            RASH  Salicylates                 Comment:unsure  Sulfa Antibiotics       RASH, SWELLING    (Not in a hospital admission)    Review of Systems:   Pertinent items are noted in HPI. 12 systems reviewed and otherwise negative.      Physic

## 2018-12-04 NOTE — ANESTHESIA PREPROCEDURE EVALUATION
Anesthesia PreOp Note    HPI:     Rosy Velasco is a 46year old female who presents for preoperative consultation requested by: * No surgeons listed *    Date of Surgery: 12/4/2018    * No procedures listed *  Indication: * No pre-op diagnosis enter 01/23/2013      Von Willebrand disease ([facility] [address]         Date Noted: 12/12/2012      Anemia, unspecified         Class: Chronic         Date Noted: 06/29/2012      Excessive or frequent menstruation         Class: Chronic         Date Noted: 06/29/2012      So Tab TAKE 1 TABLET BY MOUTH EVERY DAY AS DIRECTED Disp: 90 tablet Rfl: 3   ESOMEPRAZOLE MAGNESIUM 20 MG Oral Capsule Delayed Release TAKE ONE CAPSULE BY MOUTH EVERY DAY Disp: 90 capsule Rfl: 0   Cholecalciferol (VITAMIN D3) 3000 UNITS Oral Tab Take 1 capsul status: Never Smoker      Smokeless tobacco: Never Used    Substance and Sexual Activity      Alcohol use: No        Alcohol/week: 0.0 oz      Drug use: No      Sexual activity: Not Currently        Partners: Male    Other Topics      Concerns:        Lupe full  Dental - normal exam     Pulmonary - normal exam   (+) asthma,   Cardiovascular - normal exam    Neuro/Psych    (+) neuromuscular disease (Back and Neck pain),     GI/Hepatic/Renal    (+) GERD,     Endo/Other    Abdominal  - normal exam             A

## 2018-12-04 NOTE — ANESTHESIA PROCEDURE NOTES
ANESTHESIA INTUBATION  Date/Time: 12/4/2018 1:50 PM  Urgency: elective    Airway not difficult    General Information and Staff    Patient location during procedure: OR  Anesthesiologist: Angel Pendleton MD  Performed: anesthesiologist     Indications an

## 2018-12-04 NOTE — ANESTHESIA POSTPROCEDURE EVALUATION
Patient: Devin Clemens    Procedure Summary     Date:  12/04/18 Room / Location:  [facility] MRI; [address] [facility] Anesthesia Care Unit    Anesthesia Start:  7101 Anesthesia Stop:      Procedures:       MRI SPINE CERVICAL (W+WO) (CPT=721

## 2018-12-05 ENCOUNTER — TELEPHONE (OUTPATIENT)
Dept: FAMILY MEDICINE CLINIC | Facility: CLINIC | Age: 53
End: 2018-12-05

## 2018-12-05 NOTE — TELEPHONE ENCOUNTER
YP: all MRI reports Final for your review. Per Referral Dept: all these MRIs with all the CPT codes were authorized. 2 new Referrals were entered yesterday per YP for these same MRIs-Lumbar, Thoracic, Cervical. Should these be cancelled?

## 2018-12-14 ENCOUNTER — TELEPHONE (OUTPATIENT)
Dept: FAMILY MEDICINE CLINIC | Facility: CLINIC | Age: 53
End: 2018-12-14

## 2018-12-14 NOTE — TELEPHONE ENCOUNTER
Patient would like MRI results from the beginning of the month. Told her we would return call on Monday.

## 2018-12-26 ENCOUNTER — OFFICE VISIT (OUTPATIENT)
Dept: FAMILY MEDICINE CLINIC | Facility: CLINIC | Age: 53
End: 2018-12-26
Payer: COMMERCIAL

## 2018-12-26 VITALS
BODY MASS INDEX: 28.28 KG/M2 | HEART RATE: 85 BPM | WEIGHT: 176 LBS | TEMPERATURE: 98 F | SYSTOLIC BLOOD PRESSURE: 114 MMHG | HEIGHT: 66 IN | DIASTOLIC BLOOD PRESSURE: 64 MMHG | RESPIRATION RATE: 18 BRPM | OXYGEN SATURATION: 98 %

## 2018-12-26 DIAGNOSIS — M48.061 SPINAL STENOSIS OF LUMBAR REGION, UNSPECIFIED WHETHER NEUROGENIC CLAUDICATION PRESENT: ICD-10-CM

## 2018-12-26 DIAGNOSIS — R06.2 WHEEZING: ICD-10-CM

## 2018-12-26 DIAGNOSIS — M54.16 LUMBAR RADICULOPATHY: Primary | ICD-10-CM

## 2018-12-26 PROCEDURE — 99214 OFFICE O/P EST MOD 30 MIN: CPT | Performed by: FAMILY MEDICINE

## 2018-12-26 NOTE — PROGRESS NOTES
HPI:    Patient ID: Ghanshyam Zavala is a 46year old female. Low Back Pain   This is a chronic problem. The current episode started more than 1 year ago. The problem occurs daily. The problem is unchanged. The pain is present in the lumbar spine.  Marivel Jolley RASH  Pyrazodine [Phenazo*    RASH  Ra Ibuprofen            RASH  Salicylates                 Comment:unsure  Sulfa Antibiotics       RASH, SWELLING   PHYSICAL EXAM:   Physical Exam   Constitutional: She appears well-developed and well-nourished.    Eyes: C

## 2019-01-15 ENCOUNTER — OFFICE VISIT (OUTPATIENT)
Dept: PHYSICAL THERAPY | Age: 54
End: 2019-01-15
Attending: ORTHOPAEDIC SURGERY
Payer: COMMERCIAL

## 2019-01-15 PROCEDURE — 97163 PT EVAL HIGH COMPLEX 45 MIN: CPT

## 2019-01-15 PROCEDURE — 97110 THERAPEUTIC EXERCISES: CPT

## 2019-01-15 NOTE — PROGRESS NOTES
SPINE EVALUATION:   Referring Physician: Dr. Betancourt ref.  provider found  Diagnosis: Spondylolisthesis, L 4/5, thoracic disc bulges and cervical disc bulge  Date of Service: 1/15/2019     PATIENT SUMMARY   Rosy Santo is a 46year old y/o female who sit, stand, drive, sleep and lift. She presents with impaired ROM, joint mobility, flexibility, LE neurodynamics, muscle performance and motor function.  Unable to elicit DTRs but more due to the fact that patient was not able to relax during exam. Mackenzie Gonsalez my restrictions; L WNL  Gastroc-soleus: R/L: Moderate restrictions     Special tests:   Cervical distraction: Pos for alleviation  Spurling's Test: neg     SLR: R neg, L neg  Slump: Pos  Standing flexion test: + R hypomobility    Today’s Treatment and Respons Therapeutic Exercises; Neuromuscular Re-education; Therapeutic Activity;  Electrical Stim; Mechanical Traction; Pt education; Home exercise program instruction;     Education or treatment limitation: None  Rehab Potential:good  FOTO: 37/100    Patient/Famil

## 2019-01-21 ENCOUNTER — OFFICE VISIT (OUTPATIENT)
Dept: PHYSICAL THERAPY | Age: 54
End: 2019-01-21
Attending: FAMILY MEDICINE
Payer: COMMERCIAL

## 2019-01-21 PROCEDURE — 97140 MANUAL THERAPY 1/> REGIONS: CPT

## 2019-01-21 PROCEDURE — 97110 THERAPEUTIC EXERCISES: CPT

## 2019-01-21 PROCEDURE — 97112 NEUROMUSCULAR REEDUCATION: CPT

## 2019-01-21 NOTE — PROGRESS NOTES
Dx: Spondylolisthesis, L 4/5, thoracic disc bulges and cervical disc bulge          Authorized # of Visits:  10         Next MD visit: none scheduled  Fall Risk: standard         Precautions: n/a             Subjective: No new problems.  Reports working on paraspinals and UT x6 min         Seated: SNAGS L/R cervical rotation C2 x10 ea         Supine: R hip long axis distraction mobilization thrust SIJ bias           Charges: Ex 1 Nreed 1 MT 1       Total Timed Treatment: 45 min  Total Treatment Time: 45 min

## 2019-01-22 ENCOUNTER — APPOINTMENT (OUTPATIENT)
Dept: PHYSICAL THERAPY | Age: 54
End: 2019-01-22
Payer: COMMERCIAL

## 2019-01-25 ENCOUNTER — OFFICE VISIT (OUTPATIENT)
Dept: PHYSICAL THERAPY | Age: 54
End: 2019-01-25
Attending: FAMILY MEDICINE
Payer: COMMERCIAL

## 2019-01-25 PROCEDURE — 97110 THERAPEUTIC EXERCISES: CPT

## 2019-01-25 PROCEDURE — 97140 MANUAL THERAPY 1/> REGIONS: CPT

## 2019-01-25 PROCEDURE — 97112 NEUROMUSCULAR REEDUCATION: CPT

## 2019-01-25 NOTE — PROGRESS NOTES
Dx: Spondylolisthesis, L 4/5, thoracic disc bulges and cervical disc bulge          Authorized # of Visits:  10         Next MD visit: none scheduled  Fall Risk: standard         Precautions: n/a             Subjective: No new problems.  Reports that she is 5x 10 sec bouts  L s/l: R LP rotation mobilization Gr 2 5x 10 sec bouts        L s/l: IASTM R lumbar paraspinals x5 min  L s/l: IASTM R lumbar paraspinals x5 min        Prone: mid thoracic PA extension mobilization thrust  Prone: mid thoracic PA extension

## 2019-01-29 ENCOUNTER — APPOINTMENT (OUTPATIENT)
Dept: PHYSICAL THERAPY | Age: 54
End: 2019-01-29
Payer: COMMERCIAL

## 2019-01-31 ENCOUNTER — APPOINTMENT (OUTPATIENT)
Dept: PHYSICAL THERAPY | Age: 54
End: 2019-01-31
Payer: COMMERCIAL

## 2019-02-01 ENCOUNTER — OFFICE VISIT (OUTPATIENT)
Dept: PHYSICAL THERAPY | Age: 54
End: 2019-02-01
Attending: FAMILY MEDICINE
Payer: COMMERCIAL

## 2019-02-01 PROCEDURE — 97140 MANUAL THERAPY 1/> REGIONS: CPT

## 2019-02-01 PROCEDURE — 97110 THERAPEUTIC EXERCISES: CPT

## 2019-02-01 PROCEDURE — 97112 NEUROMUSCULAR REEDUCATION: CPT

## 2019-02-01 NOTE — PROGRESS NOTES
Dx: Spondylolisthesis, L 4/5, thoracic disc bulges and cervical disc bulge          Authorized # of Visits:  10         Next MD visit: none scheduled  Fall Risk: standard         Precautions: n/a             Subjective: No new problems.  Reports that she is Doorway T stretch 3x10 sec --        Wall press ups x10 --        bilat scapular rows and ext RTB  x10 ea --        Supine: cervical spine PROM, UT stretches and distraction x8 min --       Hkly: TrA 10x10 sec, submax  Hkly:  TrA 10x10 sec, submax Qu

## 2019-02-05 ENCOUNTER — OFFICE VISIT (OUTPATIENT)
Dept: PHYSICAL THERAPY | Age: 54
End: 2019-02-05
Attending: ORTHOPAEDIC SURGERY
Payer: COMMERCIAL

## 2019-02-05 PROCEDURE — 97110 THERAPEUTIC EXERCISES: CPT

## 2019-02-05 PROCEDURE — 97140 MANUAL THERAPY 1/> REGIONS: CPT

## 2019-02-05 PROCEDURE — 97112 NEUROMUSCULAR REEDUCATION: CPT

## 2019-02-05 NOTE — PROGRESS NOTES
Dx: Spondylolisthesis, L 4/5, thoracic disc bulges and cervical disc bulge          Authorized # of Visits:  10         Next MD visit: none scheduled  Fall Risk: standard         Precautions: n/a             Subjective: No new problems.  Reports that the qu TRX: minisquats with TrA x15       Doorway T stretch 3x10 sec -- Wall squats 10x 5 sec       Wall press ups x10 -- --       bilat scapular rows and ext RTB  x10 ea -- --       Supine: cervical spine PROM, UT stretches and distraction x8 min -- --      Hkl

## 2019-02-07 ENCOUNTER — OFFICE VISIT (OUTPATIENT)
Dept: PHYSICAL THERAPY | Age: 54
End: 2019-02-07
Attending: ORTHOPAEDIC SURGERY
Payer: COMMERCIAL

## 2019-02-07 PROCEDURE — 97110 THERAPEUTIC EXERCISES: CPT

## 2019-02-07 PROCEDURE — 97112 NEUROMUSCULAR REEDUCATION: CPT

## 2019-02-07 PROCEDURE — 97140 MANUAL THERAPY 1/> REGIONS: CPT

## 2019-02-07 NOTE — PROGRESS NOTES
Dx: Spondylolisthesis, L 4/5, thoracic disc bulges and cervical disc bulge          Authorized # of Visits:  10         Next MD visit: none scheduled  Fall Risk: standard         Precautions: n/a             Subjective: No new problems.  Reports that she is Lateral walking with YTB around ankles 35ft x 2  Lateral walking with YTB around ankles 35ft x 2  Lateral walking with YTB around ankles 35ft x 2     minisquats with TrA x10  minisquats with TrA x12  TRX: minisquats with TrA x12  TRX: minisquats with TrA min  Total Treatment Time: 45 min

## 2019-02-12 ENCOUNTER — APPOINTMENT (OUTPATIENT)
Dept: PHYSICAL THERAPY | Age: 54
End: 2019-02-12
Attending: ORTHOPAEDIC SURGERY
Payer: COMMERCIAL

## 2019-02-14 ENCOUNTER — LAB ENCOUNTER (OUTPATIENT)
Dept: LAB | Age: 54
End: 2019-02-14
Attending: INTERNAL MEDICINE
Payer: COMMERCIAL

## 2019-02-14 DIAGNOSIS — G89.29 CHRONIC BILATERAL THORACIC BACK PAIN: ICD-10-CM

## 2019-02-14 DIAGNOSIS — M54.50 LUMBAR PAIN: ICD-10-CM

## 2019-02-14 DIAGNOSIS — M54.2 CERVICAL PAIN: ICD-10-CM

## 2019-02-14 DIAGNOSIS — Z01.818 PREOP TESTING: ICD-10-CM

## 2019-02-14 DIAGNOSIS — J45.20 MILD INTERMITTENT ASTHMA WITHOUT COMPLICATION: ICD-10-CM

## 2019-02-14 DIAGNOSIS — M54.6 CHRONIC BILATERAL THORACIC BACK PAIN: ICD-10-CM

## 2019-02-14 DIAGNOSIS — R73.03 PRE-DIABETES: ICD-10-CM

## 2019-02-14 DIAGNOSIS — D68.0 VON WILLEBRAND DISEASE (HCC): ICD-10-CM

## 2019-02-14 DIAGNOSIS — Z01.818 PREOP EXAMINATION: ICD-10-CM

## 2019-02-14 DIAGNOSIS — D50.8 OTHER IRON DEFICIENCY ANEMIA: ICD-10-CM

## 2019-02-14 DIAGNOSIS — E03.9 HYPOTHYROIDISM, UNSPECIFIED TYPE: ICD-10-CM

## 2019-02-14 DIAGNOSIS — E55.9 VITAMIN D DEFICIENCY: ICD-10-CM

## 2019-02-14 DIAGNOSIS — Z85.850 HX OF THYROID CANCER: ICD-10-CM

## 2019-02-14 DIAGNOSIS — E61.2 MAGNESIUM DEFICIENCY: ICD-10-CM

## 2019-02-14 LAB
EST. AVERAGE GLUCOSE BLD GHB EST-MCNC: 120 MG/DL (ref 68–126)
HAV IGM SER QL: 2.2 MG/DL (ref 1.6–2.6)
HBA1C MFR BLD HPLC: 5.8 % (ref ?–5.7)
T4 FREE SERPL-MCNC: 1.3 NG/DL (ref 0.8–1.7)
TSI SER-ACNC: 2.26 MIU/ML (ref 0.36–3.74)
VIT D+METAB SERPL-MCNC: 45.3 NG/ML (ref 30–100)

## 2019-02-14 PROCEDURE — 82306 VITAMIN D 25 HYDROXY: CPT

## 2019-02-14 PROCEDURE — 84443 ASSAY THYROID STIM HORMONE: CPT

## 2019-02-14 PROCEDURE — 83735 ASSAY OF MAGNESIUM: CPT

## 2019-02-14 PROCEDURE — 36415 COLL VENOUS BLD VENIPUNCTURE: CPT

## 2019-02-14 PROCEDURE — 84432 ASSAY OF THYROGLOBULIN: CPT

## 2019-02-14 PROCEDURE — 84439 ASSAY OF FREE THYROXINE: CPT

## 2019-02-14 PROCEDURE — 86800 THYROGLOBULIN ANTIBODY: CPT

## 2019-02-14 PROCEDURE — 83036 HEMOGLOBIN GLYCOSYLATED A1C: CPT

## 2019-02-14 RX ORDER — MONTELUKAST SODIUM 10 MG/1
TABLET ORAL
Qty: 90 TABLET | Refills: 0 | Status: SHIPPED | OUTPATIENT
Start: 2019-02-14 | End: 2019-04-24 | Stop reason: ALTCHOICE

## 2019-02-14 NOTE — TELEPHONE ENCOUNTER
Rx Request  Montelukast Sodium 10 MG Oral Tab    Disp:         90           R: 0      Associated Dx:Mild intermittent asthma without complication    Last Visit: 12/26/2018    Last Refilled: 11/23/2018

## 2019-02-17 LAB
THYROGLOBULIN AB: <0.9 IU/ML
THYROGLOBULIN, SERUM OR PLASMA: <0.1 NG/ML

## 2019-02-19 ENCOUNTER — OFFICE VISIT (OUTPATIENT)
Dept: PHYSICAL THERAPY | Age: 54
End: 2019-02-19
Attending: ORTHOPAEDIC SURGERY
Payer: COMMERCIAL

## 2019-02-19 PROCEDURE — 97140 MANUAL THERAPY 1/> REGIONS: CPT

## 2019-02-19 PROCEDURE — 97110 THERAPEUTIC EXERCISES: CPT

## 2019-02-19 PROCEDURE — 97112 NEUROMUSCULAR REEDUCATION: CPT

## 2019-02-19 NOTE — PROGRESS NOTES
Dx: Spondylolisthesis, L 4/5, thoracic disc bulges and cervical disc bulge          Authorized # of Visits:  10         Next MD visit: none scheduled  Fall Risk: standard         Precautions: n/a             Subjective: No new problems.  Reports that her lo PF STM  With ridged theraroll x20    Standing TrA with R/L LE slides with glider into flex, abd, ext x5 ea  Standing TrA with R/L LE slides with glider into flex, abd, ext x5 ea Reformer leg press bilat knee ext 4 bands 3x10  Reformer leg press bilat knee s/l:  IASTM R lumbar paraspinals x5 min    Prone: mid thoracic PA extension mobilization thrust  Prone: mid thoracic PA extension mobilization thrust -- -- -- --    Prone: IASTM thoracic paraspinals and UT x6 min  seated: IASTM L/R UT x6 min  seated: IASTM

## 2019-02-22 ENCOUNTER — OFFICE VISIT (OUTPATIENT)
Dept: PHYSICAL THERAPY | Age: 54
End: 2019-02-22
Attending: FAMILY MEDICINE
Payer: COMMERCIAL

## 2019-02-22 PROCEDURE — 97110 THERAPEUTIC EXERCISES: CPT

## 2019-02-22 PROCEDURE — 97140 MANUAL THERAPY 1/> REGIONS: CPT

## 2019-02-22 PROCEDURE — 97112 NEUROMUSCULAR REEDUCATION: CPT

## 2019-02-22 NOTE — PROGRESS NOTES
Dx: Spondylolisthesis, L 4/5, thoracic disc bulges and cervical disc bulge          Authorized # of Visits:  10         Next MD visit: none scheduled  Fall Risk: standard         Precautions: n/a             Subjective: No new problems.  Reports that she is slides with glider into flex, abd, ext x5 ea Reformer leg press bilat knee ext 4 bands 3x10  Reformer leg press bilat knee ext 4 bands 3x10  Reformer leg press bilat knee ext 4 bands 3x10  Reformer leg press bilat knee ext 5 bands 3x10  Reformer leg press paraspinals x5 min  L s/l: IASTM R lumbar paraspinals x5 min  L s/l: IASTM R lumbar paraspinals x5 min  L s/l: IASTM R lumbar paraspinals x5 min  L s/l: IASTM R lumbar paraspinals x5 min  L s/l: IASTM R lumbar paraspinals x5 min  L s/l: IASTM R lumbar para

## 2019-02-26 ENCOUNTER — OFFICE VISIT (OUTPATIENT)
Dept: PHYSICAL THERAPY | Age: 54
End: 2019-02-26
Attending: ORTHOPAEDIC SURGERY
Payer: COMMERCIAL

## 2019-02-26 PROCEDURE — 97112 NEUROMUSCULAR REEDUCATION: CPT

## 2019-02-26 PROCEDURE — 97110 THERAPEUTIC EXERCISES: CPT

## 2019-02-26 PROCEDURE — 97140 MANUAL THERAPY 1/> REGIONS: CPT

## 2019-02-26 NOTE — PROGRESS NOTES
Dx: Spondylolisthesis, L 4/5, thoracic disc bulges and cervical disc bulge          Authorized # of Visits:  10         Next MD visit: none scheduled  Fall Risk: standard         Precautions: n/a             Subjective: No new problems.  Reports that she is 2x10    Wall squats 10x 5 sec --  Wall squats 10x 5 sec  Wall squats 10x 5 sec   -- -- --    -- -- --     Supine: cervical spine PROM, UT stretches and distraction, suboccipital release x8 min --  Supine: cervical spine PROM, UT stretches and distraction,

## 2019-03-04 ENCOUNTER — APPOINTMENT (OUTPATIENT)
Dept: PHYSICAL THERAPY | Age: 54
End: 2019-03-04
Payer: COMMERCIAL

## 2019-03-07 ENCOUNTER — APPOINTMENT (OUTPATIENT)
Dept: PHYSICAL THERAPY | Age: 54
End: 2019-03-07
Attending: ORTHOPAEDIC SURGERY
Payer: COMMERCIAL

## 2019-03-07 PROCEDURE — 97140 MANUAL THERAPY 1/> REGIONS: CPT

## 2019-03-07 PROCEDURE — 97112 NEUROMUSCULAR REEDUCATION: CPT

## 2019-03-07 PROCEDURE — 97110 THERAPEUTIC EXERCISES: CPT

## 2019-03-07 NOTE — PROGRESS NOTES
Discharge Summary    Pt has attended 10 visits in Physical Therapy. Rosy reports feeling 70-80% improvement in pain and function since starting therapy. She reports that she is able to complete all daily activities with minimal to no pain.   She reports allow increase ease with bending forward to don shoes. Met    · Pt will report improved symptom centralization and absence of radicular symptoms for 3 consecutive days to improve function with ADL.  Met   · Pt will have decreased paraspinal mm tension for i with RTB around ankles 35ft x 2  Lateral walking with RTB around ankles 35ft x 2  Lateral walking with RTB around ankles 35ft x 2  Lateral walking with RTB around ankles 35ft x 3    TRX: minisquats with TrA x15  TRX: minisquats with TrA x15  TRX: minisquat

## 2019-03-11 ENCOUNTER — HOSPITAL ENCOUNTER (OUTPATIENT)
Dept: GENERAL RADIOLOGY | Age: 54
Discharge: HOME | End: 2019-03-11
Attending: FAMILY MEDICINE
Payer: COMMERCIAL

## 2019-03-11 ENCOUNTER — OFFICE VISIT (OUTPATIENT)
Dept: FAMILY MEDICINE CLINIC | Facility: CLINIC | Age: 54
End: 2019-03-11
Payer: COMMERCIAL

## 2019-03-11 ENCOUNTER — TELEPHONE (OUTPATIENT)
Dept: FAMILY MEDICINE CLINIC | Facility: CLINIC | Age: 54
End: 2019-03-11

## 2019-03-11 VITALS
HEART RATE: 91 BPM | RESPIRATION RATE: 16 BRPM | DIASTOLIC BLOOD PRESSURE: 62 MMHG | SYSTOLIC BLOOD PRESSURE: 98 MMHG | OXYGEN SATURATION: 97 % | TEMPERATURE: 98 F

## 2019-03-11 DIAGNOSIS — S99.922A INJURY OF TOE ON LEFT FOOT, INITIAL ENCOUNTER: ICD-10-CM

## 2019-03-11 DIAGNOSIS — S99.922A INJURY OF TOE ON LEFT FOOT, INITIAL ENCOUNTER: Primary | ICD-10-CM

## 2019-03-11 PROCEDURE — 73660 X-RAY EXAM OF TOE(S): CPT | Performed by: FAMILY MEDICINE

## 2019-03-11 PROCEDURE — 99213 OFFICE O/P EST LOW 20 MIN: CPT | Performed by: FAMILY MEDICINE

## 2019-03-11 NOTE — PROGRESS NOTES
HPI:    Patient ID: Ana Sage is a 46year old female. Toe Injury    The incident occurred 12 to 24 hours ago. The incident occurred at home. The injury mechanism was a direct blow (Pt stubbed her toe). The pain is present in the left foot.  The Musculoskeletal: She exhibits edema and tenderness. Tenderness and swelling of the 4th 5th digits of the left foot, motion is slight diminished but is expected with this type of injury   Vitals reviewed.              ASSESSMENT/PLAN:   Injury of toe on

## 2019-03-11 NOTE — TELEPHONE ENCOUNTER
Per Dr. Padron Quiet,  pito tap 3rd and 4th digit togather. try to stay off it.  follow up in 1 week. Patient called and informed.

## 2019-03-18 ENCOUNTER — OFFICE VISIT (OUTPATIENT)
Dept: FAMILY MEDICINE CLINIC | Facility: CLINIC | Age: 54
End: 2019-03-18
Payer: COMMERCIAL

## 2019-03-18 VITALS
HEIGHT: 66 IN | BODY MASS INDEX: 27.97 KG/M2 | WEIGHT: 174 LBS | DIASTOLIC BLOOD PRESSURE: 86 MMHG | SYSTOLIC BLOOD PRESSURE: 98 MMHG | HEART RATE: 85 BPM | RESPIRATION RATE: 16 BRPM | TEMPERATURE: 98 F | OXYGEN SATURATION: 98 %

## 2019-03-18 DIAGNOSIS — S99.922S INJURY OF TOE ON LEFT FOOT, SEQUELA: Primary | ICD-10-CM

## 2019-03-18 DIAGNOSIS — S92.515G CLOSED NONDISPLACED FRACTURE OF PROXIMAL PHALANX OF LESSER TOE OF LEFT FOOT WITH DELAYED HEALING, SUBSEQUENT ENCOUNTER: ICD-10-CM

## 2019-03-18 PROCEDURE — 99213 OFFICE O/P EST LOW 20 MIN: CPT | Performed by: FAMILY MEDICINE

## 2019-03-18 NOTE — PROGRESS NOTES
HPI:    Patient ID: Margarita Kerr is a 46year old female. Toe Injury    The incident occurred more than 1 week ago. The incident occurred at home. The injury mechanism was a direct blow (Pt stubbed her toe). The pain is present in the left foot. Tenderness and swelling of the 4th 5th digits of the left foot, 20% better than previous visit   Vitals reviewed.              ASSESSMENT/PLAN:   Injury of toe on left foot, sequela  (primary encounter diagnosis)  Closed nondisplaced fracture of proximal

## 2019-04-22 ENCOUNTER — OFFICE VISIT (OUTPATIENT)
Dept: FAMILY MEDICINE CLINIC | Facility: CLINIC | Age: 54
End: 2019-04-22
Payer: COMMERCIAL

## 2019-04-22 VITALS
HEIGHT: 66 IN | SYSTOLIC BLOOD PRESSURE: 114 MMHG | WEIGHT: 174 LBS | TEMPERATURE: 98 F | OXYGEN SATURATION: 97 % | RESPIRATION RATE: 16 BRPM | BODY MASS INDEX: 27.97 KG/M2 | HEART RATE: 94 BPM | DIASTOLIC BLOOD PRESSURE: 68 MMHG

## 2019-04-22 DIAGNOSIS — J32.0 CHRONIC MAXILLARY SINUSITIS: Primary | ICD-10-CM

## 2019-04-22 DIAGNOSIS — J02.9 SORE THROAT: ICD-10-CM

## 2019-04-22 PROCEDURE — 99213 OFFICE O/P EST LOW 20 MIN: CPT | Performed by: FAMILY MEDICINE

## 2019-04-22 PROCEDURE — 87081 CULTURE SCREEN ONLY: CPT | Performed by: FAMILY MEDICINE

## 2019-04-22 RX ORDER — CEFDINIR 300 MG/1
300 CAPSULE ORAL 2 TIMES DAILY
Qty: 20 CAPSULE | Refills: 0 | Status: SHIPPED | OUTPATIENT
Start: 2019-04-22 | End: 2019-05-02

## 2019-04-22 NOTE — PROGRESS NOTES
Pt here with cold symptoms.     Started last Wednesday   Getting worse   OTC meds none   +  cough and congestion  +  sinus tendernss  +  ear pain  +  throat pain  No  fever and chills  ?  sick contacts  Pt got sick when in [address] otherwise negative

## 2019-04-24 ENCOUNTER — OFFICE VISIT (OUTPATIENT)
Dept: FAMILY MEDICINE CLINIC | Facility: CLINIC | Age: 54
End: 2019-04-24
Payer: COMMERCIAL

## 2019-04-24 ENCOUNTER — TELEPHONE (OUTPATIENT)
Dept: FAMILY MEDICINE CLINIC | Facility: CLINIC | Age: 54
End: 2019-04-24

## 2019-04-24 VITALS
OXYGEN SATURATION: 98 % | RESPIRATION RATE: 16 BRPM | HEART RATE: 74 BPM | DIASTOLIC BLOOD PRESSURE: 82 MMHG | SYSTOLIC BLOOD PRESSURE: 124 MMHG | HEIGHT: 66 IN | TEMPERATURE: 98 F | BODY MASS INDEX: 28.77 KG/M2 | WEIGHT: 179 LBS

## 2019-04-24 DIAGNOSIS — Z12.31 ENCOUNTER FOR SCREENING MAMMOGRAM FOR HIGH-RISK PATIENT: ICD-10-CM

## 2019-04-24 DIAGNOSIS — M79.675 PAIN OF TOE OF LEFT FOOT: ICD-10-CM

## 2019-04-24 DIAGNOSIS — Z00.00 ANNUAL PHYSICAL EXAM: ICD-10-CM

## 2019-04-24 DIAGNOSIS — Z13.820 SCREENING FOR OSTEOPOROSIS: ICD-10-CM

## 2019-04-24 DIAGNOSIS — R30.0 DYSURIA: Primary | ICD-10-CM

## 2019-04-24 DIAGNOSIS — R31.9 HEMATURIA, UNSPECIFIED TYPE: ICD-10-CM

## 2019-04-24 DIAGNOSIS — Z01.419 WELL WOMAN EXAM WITH ROUTINE GYNECOLOGICAL EXAM: ICD-10-CM

## 2019-04-24 PROCEDURE — 87660 TRICHOMONAS VAGIN DIR PROBE: CPT | Performed by: FAMILY MEDICINE

## 2019-04-24 PROCEDURE — 87480 CANDIDA DNA DIR PROBE: CPT | Performed by: FAMILY MEDICINE

## 2019-04-24 PROCEDURE — 87510 GARDNER VAG DNA DIR PROBE: CPT | Performed by: FAMILY MEDICINE

## 2019-04-24 PROCEDURE — 87086 URINE CULTURE/COLONY COUNT: CPT | Performed by: FAMILY MEDICINE

## 2019-04-24 PROCEDURE — 99396 PREV VISIT EST AGE 40-64: CPT | Performed by: FAMILY MEDICINE

## 2019-04-24 PROCEDURE — 81003 URINALYSIS AUTO W/O SCOPE: CPT | Performed by: FAMILY MEDICINE

## 2019-04-24 NOTE — PROGRESS NOTES
HPI:   Chase Irizarry is a 46year old female who presents for a complete physical exam.     Wt Readings from Last 6 Encounters:  04/24/19 : 179 lb  04/22/19 : 174 lb  03/18/19 : 174 lb  03/15/19 : 176 lb  12/26/18 : 176 lb  12/04/18 : 171 lb    Body MANAGEMENT   • LUMBAR EPIDURAL N/A 1/11/2016    Performed by Priya Escobar MD at [address]   • NEEDLE BIOPSY RIGHT Right 12/2011    US guided bx w/Dr. Tommy Fajardo - benign   • OTHER SURGICAL HISTORY      thyroid surgery   • THYROIDECTOMY lesions, mild redness at introitus. Speculum exam- introitus is normal,scant discharge,cervix is pink.  Bimanual exam- no adnexal masses no tenderness  RECTAL:good rectal tone,no mass, brown stool, stool is OB negative  MUSCULOSKELETAL: back is not tender,

## 2019-04-24 NOTE — TELEPHONE ENCOUNTER
Spoke to pt ,she wanted us to reschedule her physical appt. For Thursday or Friday this week. Explained to pt we are unable to do that due to the Dr's only doing so many physicals a day. Pt states she will keep her appt today she is feeling well enough.

## 2019-04-24 NOTE — TELEPHONE ENCOUNTER
Pt's daughter called on her behalf.  She said mom is not feelinf well, and would like to reschedule her appt for her physical. When she heard that Dr Drew Lomeli was booking out until July for her appts, she said to keep it  For today, and ask Dr Drew Lomeli if she

## 2019-04-27 ENCOUNTER — LAB ENCOUNTER (OUTPATIENT)
Dept: LAB | Age: 54
End: 2019-04-27
Attending: FAMILY MEDICINE
Payer: COMMERCIAL

## 2019-04-27 DIAGNOSIS — Z00.00 ANNUAL PHYSICAL EXAM: ICD-10-CM

## 2019-04-27 PROCEDURE — 82607 VITAMIN B-12: CPT | Performed by: FAMILY MEDICINE

## 2019-04-27 PROCEDURE — 83036 HEMOGLOBIN GLYCOSYLATED A1C: CPT | Performed by: FAMILY MEDICINE

## 2019-04-27 PROCEDURE — 82306 VITAMIN D 25 HYDROXY: CPT | Performed by: FAMILY MEDICINE

## 2019-04-27 PROCEDURE — 36415 COLL VENOUS BLD VENIPUNCTURE: CPT | Performed by: FAMILY MEDICINE

## 2019-04-27 PROCEDURE — 84439 ASSAY OF FREE THYROXINE: CPT | Performed by: FAMILY MEDICINE

## 2019-04-27 PROCEDURE — 80050 GENERAL HEALTH PANEL: CPT | Performed by: FAMILY MEDICINE

## 2019-04-27 PROCEDURE — 80061 LIPID PANEL: CPT | Performed by: FAMILY MEDICINE

## 2019-04-29 DIAGNOSIS — E78.00 ELEVATED CHOLESTEROL: Primary | ICD-10-CM

## 2019-04-29 DIAGNOSIS — R73.09 ELEVATED GLUCOSE: ICD-10-CM

## 2019-05-18 ENCOUNTER — HOSPITAL ENCOUNTER (OUTPATIENT)
Dept: MAMMOGRAPHY | Age: 54
Discharge: HOME | End: 2019-05-18
Attending: FAMILY MEDICINE
Payer: COMMERCIAL

## 2019-05-18 ENCOUNTER — HOSPITAL ENCOUNTER (OUTPATIENT)
Dept: BONE DENSITY | Age: 54
Discharge: HOME | End: 2019-05-18
Attending: FAMILY MEDICINE
Payer: COMMERCIAL

## 2019-05-18 DIAGNOSIS — Z12.31 ENCOUNTER FOR SCREENING MAMMOGRAM FOR HIGH-RISK PATIENT: ICD-10-CM

## 2019-05-18 DIAGNOSIS — Z13.820 SCREENING FOR OSTEOPOROSIS: ICD-10-CM

## 2019-05-18 PROCEDURE — 77080 DXA BONE DENSITY AXIAL: CPT | Performed by: FAMILY MEDICINE

## 2019-05-18 PROCEDURE — 77063 BREAST TOMOSYNTHESIS BI: CPT | Performed by: FAMILY MEDICINE

## 2019-05-18 PROCEDURE — 77067 SCR MAMMO BI INCL CAD: CPT | Performed by: FAMILY MEDICINE

## 2019-05-21 ENCOUNTER — HOSPITAL ENCOUNTER (OUTPATIENT)
Dept: GENERAL RADIOLOGY | Age: 54
Discharge: HOME | End: 2019-05-21
Attending: FAMILY MEDICINE
Payer: COMMERCIAL

## 2019-05-21 ENCOUNTER — OFFICE VISIT (OUTPATIENT)
Dept: FAMILY MEDICINE CLINIC | Facility: CLINIC | Age: 54
End: 2019-05-21
Payer: COMMERCIAL

## 2019-05-21 VITALS
RESPIRATION RATE: 16 BRPM | HEIGHT: 66 IN | DIASTOLIC BLOOD PRESSURE: 70 MMHG | HEART RATE: 104 BPM | OXYGEN SATURATION: 98 % | SYSTOLIC BLOOD PRESSURE: 110 MMHG | WEIGHT: 176 LBS | TEMPERATURE: 98 F | BODY MASS INDEX: 28.28 KG/M2

## 2019-05-21 DIAGNOSIS — J34.2 DEVIATED NASAL SEPTUM: ICD-10-CM

## 2019-05-21 DIAGNOSIS — R93.89 ABNORMAL MRI: ICD-10-CM

## 2019-05-21 DIAGNOSIS — J30.9 ALLERGIC RHINITIS, UNSPECIFIED SEASONALITY, UNSPECIFIED TRIGGER: Primary | ICD-10-CM

## 2019-05-21 PROCEDURE — 71046 X-RAY EXAM CHEST 2 VIEWS: CPT | Performed by: FAMILY MEDICINE

## 2019-05-21 PROCEDURE — 99213 OFFICE O/P EST LOW 20 MIN: CPT | Performed by: FAMILY MEDICINE

## 2019-05-21 NOTE — PROGRESS NOTES
Pt here with persistent cold symptoms.     Ongoing c/c and sore throat am only   Not better   OTC meds none   +  sinus tendernss  +  ear pain  +  throat pain  No  fever and chills  ?  sick contacts  Pt got sick when in [place] / s/p round of abx     abx did

## 2019-05-24 ENCOUNTER — TELEPHONE (OUTPATIENT)
Dept: FAMILY MEDICINE CLINIC | Facility: CLINIC | Age: 54
End: 2019-05-24

## 2019-05-29 PROBLEM — R12 HEARTBURN: Status: ACTIVE | Noted: 2019-05-29

## 2019-05-29 PROBLEM — R05.9 COUGH: Status: ACTIVE | Noted: 2019-05-29

## 2019-05-29 PROBLEM — R10.13 EPIGASTRIC PAIN: Status: ACTIVE | Noted: 2019-05-29

## 2019-05-29 PROBLEM — R13.19 OTHER DYSPHAGIA: Status: ACTIVE | Noted: 2019-05-29

## 2019-05-29 PROBLEM — D13.1 BENIGN NEOPLASM OF STOMACH: Status: ACTIVE | Noted: 2019-05-29

## 2019-05-30 DIAGNOSIS — N64.4 BREAST PAIN, LEFT: Primary | ICD-10-CM

## 2019-08-05 ENCOUNTER — APPOINTMENT (OUTPATIENT)
Dept: LAB | Age: 54
End: 2019-08-05
Attending: FAMILY MEDICINE
Payer: COMMERCIAL

## 2019-08-05 DIAGNOSIS — E78.00 ELEVATED CHOLESTEROL: ICD-10-CM

## 2019-08-05 DIAGNOSIS — R73.09 ELEVATED GLUCOSE: ICD-10-CM

## 2019-08-05 LAB
ALBUMIN SERPL-MCNC: 3.9 G/DL (ref 3.4–5)
ALBUMIN/GLOB SERPL: 1 {RATIO} (ref 1–2)
ALP LIVER SERPL-CCNC: 76 U/L (ref 41–108)
ALT SERPL-CCNC: 26 U/L (ref 13–56)
ANION GAP SERPL CALC-SCNC: 2 MMOL/L (ref 0–18)
AST SERPL-CCNC: 17 U/L (ref 15–37)
BILIRUB SERPL-MCNC: 0.4 MG/DL (ref 0.1–2)
BUN BLD-MCNC: 17 MG/DL (ref 7–18)
BUN/CREAT SERPL: 18.7 (ref 10–20)
CALCIUM BLD-MCNC: 9.2 MG/DL (ref 8.5–10.1)
CHLORIDE SERPL-SCNC: 107 MMOL/L (ref 98–112)
CHOLEST SMN-MCNC: 201 MG/DL (ref ?–200)
CO2 SERPL-SCNC: 31 MMOL/L (ref 21–32)
CREAT BLD-MCNC: 0.91 MG/DL (ref 0.55–1.02)
EST. AVERAGE GLUCOSE BLD GHB EST-MCNC: 128 MG/DL (ref 68–126)
GLOBULIN PLAS-MCNC: 3.8 G/DL (ref 2.8–4.4)
GLUCOSE BLD-MCNC: 100 MG/DL (ref 70–99)
HBA1C MFR BLD HPLC: 6.1 % (ref ?–5.7)
HDLC SERPL-MCNC: 51 MG/DL (ref 40–59)
LDLC SERPL CALC-MCNC: 129 MG/DL (ref ?–100)
M PROTEIN MFR SERPL ELPH: 7.7 G/DL (ref 6.4–8.2)
NONHDLC SERPL-MCNC: 150 MG/DL (ref ?–130)
OSMOLALITY SERPL CALC.SUM OF ELEC: 292 MOSM/KG (ref 275–295)
POTASSIUM SERPL-SCNC: 4.2 MMOL/L (ref 3.5–5.1)
SODIUM SERPL-SCNC: 140 MMOL/L (ref 136–145)
TRIGL SERPL-MCNC: 104 MG/DL (ref 30–149)
VLDLC SERPL CALC-MCNC: 21 MG/DL (ref 0–30)

## 2019-08-05 PROCEDURE — 80053 COMPREHEN METABOLIC PANEL: CPT | Performed by: FAMILY MEDICINE

## 2019-08-05 PROCEDURE — 83036 HEMOGLOBIN GLYCOSYLATED A1C: CPT | Performed by: FAMILY MEDICINE

## 2019-08-05 PROCEDURE — 36415 COLL VENOUS BLD VENIPUNCTURE: CPT | Performed by: FAMILY MEDICINE

## 2019-08-05 PROCEDURE — 80061 LIPID PANEL: CPT | Performed by: FAMILY MEDICINE

## 2019-11-29 ENCOUNTER — APPOINTMENT (OUTPATIENT)
Dept: LAB | Age: 54
End: 2019-11-29
Attending: FAMILY MEDICINE
Payer: COMMERCIAL

## 2019-11-29 DIAGNOSIS — R73.03 PRE-DIABETES: ICD-10-CM

## 2019-11-29 DIAGNOSIS — E78.5 ELEVATED LIPIDS: ICD-10-CM

## 2019-11-29 PROCEDURE — 36415 COLL VENOUS BLD VENIPUNCTURE: CPT | Performed by: FAMILY MEDICINE

## 2019-11-29 PROCEDURE — 83036 HEMOGLOBIN GLYCOSYLATED A1C: CPT | Performed by: FAMILY MEDICINE

## 2019-11-29 PROCEDURE — 80061 LIPID PANEL: CPT | Performed by: FAMILY MEDICINE

## 2019-12-09 ENCOUNTER — TELEPHONE (OUTPATIENT)
Dept: FAMILY MEDICINE CLINIC | Facility: CLINIC | Age: 54
End: 2019-12-09

## 2019-12-09 DIAGNOSIS — E78.00 ELEVATED CHOLESTEROL: Primary | ICD-10-CM

## 2019-12-09 RX ORDER — ROSUVASTATIN CALCIUM 10 MG/1
10 TABLET, COATED ORAL NIGHTLY
Qty: 90 TABLET | Refills: 0 | Status: SHIPPED | OUTPATIENT
Start: 2019-12-09 | End: 2021-05-25

## 2020-02-29 ENCOUNTER — LAB ENCOUNTER (OUTPATIENT)
Dept: LAB | Age: 55
End: 2020-02-29
Attending: INTERNAL MEDICINE
Payer: COMMERCIAL

## 2020-02-29 DIAGNOSIS — E06.3 CHRONIC LYMPHOCYTIC THYROIDITIS: Primary | ICD-10-CM

## 2020-02-29 LAB
T4 FREE SERPL-MCNC: 1.2 NG/DL (ref 0.8–1.7)
TSI SER-ACNC: 1.27 MIU/ML (ref 0.36–3.74)

## 2020-02-29 PROCEDURE — 84439 ASSAY OF FREE THYROXINE: CPT

## 2020-02-29 PROCEDURE — 36415 COLL VENOUS BLD VENIPUNCTURE: CPT

## 2020-02-29 PROCEDURE — 86800 THYROGLOBULIN ANTIBODY: CPT

## 2020-02-29 PROCEDURE — 84432 ASSAY OF THYROGLOBULIN: CPT

## 2020-02-29 PROCEDURE — 84443 ASSAY THYROID STIM HORMONE: CPT

## 2020-03-01 LAB
THYROGLOBULIN AB: <0.9 IU/ML
THYROGLOBULIN, SERUM OR PLASMA: <0.1 NG/ML

## 2020-06-16 ENCOUNTER — VIRTUAL PHONE E/M (OUTPATIENT)
Dept: FAMILY MEDICINE CLINIC | Facility: CLINIC | Age: 55
End: 2020-06-16
Payer: COMMERCIAL

## 2020-06-16 DIAGNOSIS — R73.03 PRE-DIABETES: Primary | ICD-10-CM

## 2020-06-16 DIAGNOSIS — E78.00 ELEVATED CHOLESTEROL: ICD-10-CM

## 2020-06-16 PROCEDURE — 99213 OFFICE O/P EST LOW 20 MIN: CPT | Performed by: FAMILY MEDICINE

## 2020-06-16 NOTE — PROGRESS NOTES
Virtual Telephone Check-In    Rosy Villar verbally consents to a Virtual/Telephone Check-In visit on 06/16/20. Patient has been referred to the [facility] website at [email or web address] to review the yearly Consent to Treat document.     Patient und 3.4 - 5.0 g/dL  3.8  3.9   Globulin      2.8 - 4.4 g/dL  4.0  3.8   A/G Ratio      1.0 - 2.0  1.0  1.0   Patient Fasting?        Yes Yes Yes    Cholesterol, Total      <200 mg/dL 209 (H)  220 (H) 201 (H)   HDL Cholesterol      40 - 59 mg/dL 63 (H)  57 51 Procedure Laterality Date   • LUMBAR EPIDURAL N/A 2/1/2016    Performed by Arnaldo Griffiths MD at [address] [facility] N/A 1/11/2016    Performed by Arnaldo Griffiths MD at [address] [facility]   • NEEDLE BIOPSY RIGHT

## 2020-06-17 ENCOUNTER — APPOINTMENT (OUTPATIENT)
Dept: LAB | Age: 55
End: 2020-06-17
Attending: FAMILY MEDICINE
Payer: COMMERCIAL

## 2020-06-17 DIAGNOSIS — E78.00 ELEVATED CHOLESTEROL: ICD-10-CM

## 2020-06-17 DIAGNOSIS — R73.03 PRE-DIABETES: ICD-10-CM

## 2020-06-17 PROCEDURE — 82043 UR ALBUMIN QUANTITATIVE: CPT | Performed by: FAMILY MEDICINE

## 2020-06-17 PROCEDURE — 82570 ASSAY OF URINE CREATININE: CPT | Performed by: FAMILY MEDICINE

## 2020-06-17 PROCEDURE — 36415 COLL VENOUS BLD VENIPUNCTURE: CPT | Performed by: FAMILY MEDICINE

## 2020-06-17 PROCEDURE — 83036 HEMOGLOBIN GLYCOSYLATED A1C: CPT | Performed by: FAMILY MEDICINE

## 2020-06-17 PROCEDURE — 80061 LIPID PANEL: CPT | Performed by: FAMILY MEDICINE

## 2020-06-17 PROCEDURE — 80053 COMPREHEN METABOLIC PANEL: CPT | Performed by: FAMILY MEDICINE

## 2020-06-23 ENCOUNTER — OFFICE VISIT (OUTPATIENT)
Dept: FAMILY MEDICINE CLINIC | Facility: CLINIC | Age: 55
End: 2020-06-23
Payer: COMMERCIAL

## 2020-06-23 VITALS
RESPIRATION RATE: 16 BRPM | OXYGEN SATURATION: 98 % | BODY MASS INDEX: 27.8 KG/M2 | WEIGHT: 173 LBS | SYSTOLIC BLOOD PRESSURE: 128 MMHG | TEMPERATURE: 97 F | DIASTOLIC BLOOD PRESSURE: 82 MMHG | HEIGHT: 66 IN | HEART RATE: 94 BPM

## 2020-06-23 DIAGNOSIS — M79.645 THUMB PAIN, LEFT: ICD-10-CM

## 2020-06-23 DIAGNOSIS — M79.671 PAIN OF BOTH HEELS: ICD-10-CM

## 2020-06-23 DIAGNOSIS — M79.672 PAIN OF BOTH HEELS: ICD-10-CM

## 2020-06-23 DIAGNOSIS — Z00.00 GENERAL MEDICAL EXAM: ICD-10-CM

## 2020-06-23 DIAGNOSIS — M54.2 NECK PAIN: Primary | ICD-10-CM

## 2020-06-23 DIAGNOSIS — H65.113 ACUTE ALLERGIC SEROUS OTITIS MEDIA, BILATERAL: ICD-10-CM

## 2020-06-23 DIAGNOSIS — R20.2 PARESTHESIA: ICD-10-CM

## 2020-06-23 PROCEDURE — 99214 OFFICE O/P EST MOD 30 MIN: CPT | Performed by: FAMILY MEDICINE

## 2020-06-23 RX ORDER — MONTELUKAST SODIUM 10 MG/1
10 TABLET ORAL DAILY
Qty: 90 TABLET | Refills: 1 | Status: SHIPPED | OUTPATIENT
Start: 2020-06-23 | End: 2020-09-08 | Stop reason: ALTCHOICE

## 2020-06-23 NOTE — PROGRESS NOTES
HPI:   Dino Hastings is a 53year old female here with multiple concerns     Pt reports left hand pain for 2 weeks   Getting worse   No injury or fall   Pt will have tingling and weakness into 1st 2nd and 3rd fingers    Bilateral heel pain for one mo neural foramen. C4-C5:  Disk osteophyte complex, facet, and uncinate joint hypertrophy result in mild narrowing of the canal and bilateral neural foramen.   C5-C6:  Disk osteophyte complex which is asymmetric to the left along with facet and uncinate joint Unspecified otitis media    • Unspecified psychophysiological malfunction    • Unspecified vitamin D deficiency    • Von Willebrand's disease ([facility] [address]    • Wears glasses    • Weight gain    • Wheezing       Past Surgical History:   Procedure Laterality Date EXTREMITIES: no cyanosis, clubbing or edema  Bilateral heels: + pain of distal heels bilaterally   Left thumb: + pain at the base   + neg tinel's on left hand   HEENT: TM's effusion no redness, cobblestoning of PP   NEURO: cranial nerves are intact,motor

## 2020-07-08 ENCOUNTER — HOSPITAL ENCOUNTER (OUTPATIENT)
Dept: GENERAL RADIOLOGY | Age: 55
Discharge: HOME | End: 2020-07-08
Attending: FAMILY MEDICINE
Payer: COMMERCIAL

## 2020-07-08 DIAGNOSIS — M79.671 PAIN OF BOTH HEELS: ICD-10-CM

## 2020-07-08 DIAGNOSIS — M79.672 PAIN OF BOTH HEELS: ICD-10-CM

## 2020-07-08 DIAGNOSIS — M79.645 THUMB PAIN, LEFT: ICD-10-CM

## 2020-07-08 PROCEDURE — 73650 X-RAY EXAM OF HEEL: CPT | Performed by: FAMILY MEDICINE

## 2020-07-08 PROCEDURE — 73140 X-RAY EXAM OF FINGER(S): CPT | Performed by: FAMILY MEDICINE

## 2020-08-17 ENCOUNTER — TELEPHONE (OUTPATIENT)
Dept: PHYSICAL THERAPY | Age: 55
End: 2020-08-17

## 2020-08-18 ENCOUNTER — OFFICE VISIT (OUTPATIENT)
Dept: PHYSICAL THERAPY | Age: 55
End: 2020-08-18
Attending: FAMILY MEDICINE
Payer: COMMERCIAL

## 2020-08-18 DIAGNOSIS — M79.673 INFLAMMATORY HEEL PAIN, UNSPECIFIED LATERALITY: ICD-10-CM

## 2020-08-18 DIAGNOSIS — M54.2 NECK PAIN: ICD-10-CM

## 2020-08-18 PROCEDURE — 97110 THERAPEUTIC EXERCISES: CPT

## 2020-08-18 PROCEDURE — 97161 PT EVAL LOW COMPLEX 20 MIN: CPT

## 2020-08-20 ENCOUNTER — TELEPHONE (OUTPATIENT)
Dept: PHYSICAL THERAPY | Age: 55
End: 2020-08-20

## 2020-08-21 ENCOUNTER — APPOINTMENT (OUTPATIENT)
Dept: PHYSICAL THERAPY | Age: 55
End: 2020-08-21
Attending: FAMILY MEDICINE
Payer: COMMERCIAL

## 2020-08-24 ENCOUNTER — OFFICE VISIT (OUTPATIENT)
Dept: PHYSICAL THERAPY | Age: 55
End: 2020-08-24
Attending: FAMILY MEDICINE
Payer: COMMERCIAL

## 2020-08-24 PROCEDURE — 97140 MANUAL THERAPY 1/> REGIONS: CPT

## 2020-08-24 PROCEDURE — 97110 THERAPEUTIC EXERCISES: CPT

## 2020-08-24 NOTE — PROGRESS NOTES
Dx: Neck pain (M54.2)      Inflammatory heel pain, unspecified laterality (D29.605         Insurance (Authorized # of Visits):  9 visits approved           Authorizing Physician: Dr. Fritz Tomas MD visit: none scheduled  Fall Risk: standard         Trisha Dent

## 2020-08-25 ENCOUNTER — LAB ENCOUNTER (OUTPATIENT)
Dept: LAB | Age: 55
End: 2020-08-25
Attending: FAMILY MEDICINE
Payer: COMMERCIAL

## 2020-08-25 DIAGNOSIS — Z00.00 GENERAL MEDICAL EXAM: ICD-10-CM

## 2020-08-25 LAB
ALBUMIN SERPL-MCNC: 3.7 G/DL (ref 3.4–5)
ALBUMIN/GLOB SERPL: 1 {RATIO} (ref 1–2)
ALP LIVER SERPL-CCNC: 71 U/L (ref 41–108)
ALT SERPL-CCNC: 24 U/L (ref 13–56)
ANION GAP SERPL CALC-SCNC: 2 MMOL/L (ref 0–18)
AST SERPL-CCNC: 18 U/L (ref 15–37)
BASOPHILS # BLD AUTO: 0.04 X10(3) UL (ref 0–0.2)
BASOPHILS NFR BLD AUTO: 0.7 %
BILIRUB SERPL-MCNC: 0.3 MG/DL (ref 0.1–2)
BUN BLD-MCNC: 17 MG/DL (ref 7–18)
BUN/CREAT SERPL: 19.8 (ref 10–20)
CALCIUM BLD-MCNC: 8.5 MG/DL (ref 8.5–10.1)
CHLORIDE SERPL-SCNC: 107 MMOL/L (ref 98–112)
CHOLEST SMN-MCNC: 188 MG/DL (ref ?–200)
CO2 SERPL-SCNC: 31 MMOL/L (ref 21–32)
CREAT BLD-MCNC: 0.86 MG/DL (ref 0.55–1.02)
DEPRECATED RDW RBC AUTO: 44.3 FL (ref 35.1–46.3)
EOSINOPHIL # BLD AUTO: 0.22 X10(3) UL (ref 0–0.7)
EOSINOPHIL NFR BLD AUTO: 3.9 %
ERYTHROCYTE [DISTWIDTH] IN BLOOD BY AUTOMATED COUNT: 13.8 % (ref 11–15)
EST. AVERAGE GLUCOSE BLD GHB EST-MCNC: 117 MG/DL (ref 68–126)
GLOBULIN PLAS-MCNC: 3.7 G/DL (ref 2.8–4.4)
GLUCOSE BLD-MCNC: 101 MG/DL (ref 70–99)
HBA1C MFR BLD HPLC: 5.7 % (ref ?–5.7)
HCT VFR BLD AUTO: 39.5 % (ref 35–48)
HDLC SERPL-MCNC: 52 MG/DL (ref 40–59)
HGB BLD-MCNC: 13.1 G/DL (ref 12–16)
IMM GRANULOCYTES # BLD AUTO: 0.01 X10(3) UL (ref 0–1)
IMM GRANULOCYTES NFR BLD: 0.2 %
LDLC SERPL CALC-MCNC: 112 MG/DL (ref ?–100)
LYMPHOCYTES # BLD AUTO: 1.88 X10(3) UL (ref 1–4)
LYMPHOCYTES NFR BLD AUTO: 33.5 %
M PROTEIN MFR SERPL ELPH: 7.4 G/DL (ref 6.4–8.2)
MCH RBC QN AUTO: 29.2 PG (ref 26–34)
MCHC RBC AUTO-ENTMCNC: 33.2 G/DL (ref 31–37)
MCV RBC AUTO: 88.2 FL (ref 80–100)
MONOCYTES # BLD AUTO: 0.48 X10(3) UL (ref 0.1–1)
MONOCYTES NFR BLD AUTO: 8.6 %
NEUTROPHILS # BLD AUTO: 2.98 X10 (3) UL (ref 1.5–7.7)
NEUTROPHILS # BLD AUTO: 2.98 X10(3) UL (ref 1.5–7.7)
NEUTROPHILS NFR BLD AUTO: 53.1 %
NONHDLC SERPL-MCNC: 136 MG/DL (ref ?–130)
OSMOLALITY SERPL CALC.SUM OF ELEC: 292 MOSM/KG (ref 275–295)
PATIENT FASTING Y/N/NP: YES
PATIENT FASTING Y/N/NP: YES
PLATELET # BLD AUTO: 244 10(3)UL (ref 150–450)
POTASSIUM SERPL-SCNC: 4.5 MMOL/L (ref 3.5–5.1)
RBC # BLD AUTO: 4.48 X10(6)UL (ref 3.8–5.3)
SODIUM SERPL-SCNC: 140 MMOL/L (ref 136–145)
T4 FREE SERPL-MCNC: 1.3 NG/DL (ref 0.8–1.7)
TRIGL SERPL-MCNC: 122 MG/DL (ref 30–149)
TSI SER-ACNC: 1.5 MIU/ML (ref 0.36–3.74)
VIT B12 SERPL-MCNC: 668 PG/ML (ref 193–986)
VIT D+METAB SERPL-MCNC: 34 NG/ML (ref 30–100)
VLDLC SERPL CALC-MCNC: 24 MG/DL (ref 0–30)
WBC # BLD AUTO: 5.6 X10(3) UL (ref 4–11)

## 2020-08-25 PROCEDURE — 82607 VITAMIN B-12: CPT | Performed by: FAMILY MEDICINE

## 2020-08-25 PROCEDURE — 84439 ASSAY OF FREE THYROXINE: CPT | Performed by: FAMILY MEDICINE

## 2020-08-25 PROCEDURE — 36415 COLL VENOUS BLD VENIPUNCTURE: CPT | Performed by: FAMILY MEDICINE

## 2020-08-25 PROCEDURE — 80050 GENERAL HEALTH PANEL: CPT | Performed by: FAMILY MEDICINE

## 2020-08-25 PROCEDURE — 80061 LIPID PANEL: CPT | Performed by: FAMILY MEDICINE

## 2020-08-25 PROCEDURE — 83036 HEMOGLOBIN GLYCOSYLATED A1C: CPT | Performed by: FAMILY MEDICINE

## 2020-08-25 PROCEDURE — 82306 VITAMIN D 25 HYDROXY: CPT | Performed by: FAMILY MEDICINE

## 2020-08-27 ENCOUNTER — APPOINTMENT (OUTPATIENT)
Dept: PHYSICAL THERAPY | Age: 55
End: 2020-08-27
Attending: FAMILY MEDICINE
Payer: COMMERCIAL

## 2020-08-27 ENCOUNTER — TELEPHONE (OUTPATIENT)
Dept: PHYSICAL THERAPY | Age: 55
End: 2020-08-27

## 2020-08-31 ENCOUNTER — OFFICE VISIT (OUTPATIENT)
Dept: PHYSICAL THERAPY | Age: 55
End: 2020-08-31
Attending: FAMILY MEDICINE
Payer: COMMERCIAL

## 2020-08-31 PROCEDURE — 97110 THERAPEUTIC EXERCISES: CPT

## 2020-08-31 PROCEDURE — 97140 MANUAL THERAPY 1/> REGIONS: CPT

## 2020-08-31 NOTE — PROGRESS NOTES
Dx: Neck pain (M54.2)      Inflammatory heel pain, unspecified laterality (R61.029         Insurance (Authorized # of Visits):  9 visits approved           Authorizing Physician: Dr. Gene Wise  Next MD visit: none scheduled  Fall Risk: standard         [facility] mobilization Gr lll-lV Prone: thoracic spine mobilization Gr lll-lV  Thoracic spine lateral mobilizations Grlll x5 min      Supine: IASTM L plantar fascia x 5 min  Supine: IASTM L plantar fascia x 5 min      CP x10 min L heel  CP x10 min L heel      HEP: 8

## 2020-09-03 ENCOUNTER — OFFICE VISIT (OUTPATIENT)
Dept: PHYSICAL THERAPY | Age: 55
End: 2020-09-03
Attending: FAMILY MEDICINE
Payer: COMMERCIAL

## 2020-09-03 PROCEDURE — 97110 THERAPEUTIC EXERCISES: CPT

## 2020-09-03 PROCEDURE — 97140 MANUAL THERAPY 1/> REGIONS: CPT

## 2020-09-03 NOTE — PROGRESS NOTES
Dx: Neck pain (M54.2)      Inflammatory heel pain, unspecified laterality (E35.142         Insurance (Authorized # of Visits):  9 visits approved           Authorizing Physician: Dr. Ahsan Owens  Next MD visit: none scheduled  Fall Risk: standard         Gaurav Mancilla soleus stretch 3x20 sec     Multi level self thoracic ext stretch on FR 3x10 reps  Multi level self thoracic ext stretch on FR 3x10 reps  Multi level self thoracic ext stretch on FR 3x10 reps     Supine: cervical distraction, PROM and stretches x10 min  Ramos

## 2020-09-07 NOTE — PROGRESS NOTES
HPI:   Di Brochakshat is a 53year old female here for scalp lesion, left foot lesion and lab follow up and left hand pain     Component      Latest Ref Rng & Units 8/25/2020 8/25/2020 6/17/2020 6/17/2020           8:22 AM  8:22 AM  9:06 AM  9:06 AM swallowing    • Reflux esophagitis    • Sleep disturbance    • Sprain of lumbar region    • [facility]   • Unspecified hypothyroidism    • Unspecified otitis media    • Unspecified psychophysiological malfunction    • Unspecified vitamin D d distress  SKIN: norashes,no suspicious lesions  MUSCULOSKELETAL: + tight cervical and trapezius pain bilaterally   EXTREMITIES: no cyanosis, clubbing or edema  SKIN: right scalp 1m lesion c/w lipoma   Left foot + verrucal lesion   NEURO: cranial nerves are

## 2020-09-08 ENCOUNTER — OFFICE VISIT (OUTPATIENT)
Dept: FAMILY MEDICINE CLINIC | Facility: CLINIC | Age: 55
End: 2020-09-08
Payer: COMMERCIAL

## 2020-09-08 ENCOUNTER — OFFICE VISIT (OUTPATIENT)
Dept: PHYSICAL THERAPY | Age: 55
End: 2020-09-08
Attending: FAMILY MEDICINE
Payer: COMMERCIAL

## 2020-09-08 VITALS
HEART RATE: 92 BPM | HEIGHT: 66 IN | OXYGEN SATURATION: 98 % | WEIGHT: 174 LBS | RESPIRATION RATE: 16 BRPM | SYSTOLIC BLOOD PRESSURE: 102 MMHG | TEMPERATURE: 98 F | BODY MASS INDEX: 27.97 KG/M2 | DIASTOLIC BLOOD PRESSURE: 76 MMHG

## 2020-09-08 DIAGNOSIS — R73.03 PRE-DIABETES: Primary | ICD-10-CM

## 2020-09-08 DIAGNOSIS — L72.3 SEBACEOUS CYST: ICD-10-CM

## 2020-09-08 DIAGNOSIS — E78.00 ELEVATED CHOLESTEROL: ICD-10-CM

## 2020-09-08 DIAGNOSIS — B07.0 PLANTAR WART: ICD-10-CM

## 2020-09-08 DIAGNOSIS — M54.2 NECK PAIN: ICD-10-CM

## 2020-09-08 DIAGNOSIS — M48.02 CERVICAL STENOSIS OF SPINAL CANAL: ICD-10-CM

## 2020-09-08 DIAGNOSIS — M19.042 PRIMARY OSTEOARTHRITIS OF LEFT HAND: ICD-10-CM

## 2020-09-08 PROCEDURE — 3074F SYST BP LT 130 MM HG: CPT | Performed by: FAMILY MEDICINE

## 2020-09-08 PROCEDURE — 3078F DIAST BP <80 MM HG: CPT | Performed by: FAMILY MEDICINE

## 2020-09-08 PROCEDURE — 99214 OFFICE O/P EST MOD 30 MIN: CPT | Performed by: FAMILY MEDICINE

## 2020-09-08 PROCEDURE — 3008F BODY MASS INDEX DOCD: CPT | Performed by: FAMILY MEDICINE

## 2020-09-08 PROCEDURE — 97140 MANUAL THERAPY 1/> REGIONS: CPT

## 2020-09-08 PROCEDURE — 97110 THERAPEUTIC EXERCISES: CPT

## 2020-09-08 NOTE — PROGRESS NOTES
Dx: Neck pain (M54.2)      Inflammatory heel pain, unspecified laterality (M28.229         Insurance (Authorized # of Visits):  9 visits approved           Authorizing Physician: Dr. Gail Garces  Next MD visit: none scheduled  Fall Risk: standard         Simuel Romp scapular low rows and high rows x20 ea    bilat gastroc stretches 3x30 sec  bilat gastroc stretches 3x30 sec  bilat gastroc stretches 3x30 sec --    L soleus stretch 3x20 sec L soleus stretch 3x20 sec  L soleus stretch 3x20 sec --    Multi level self thora

## 2020-09-09 ENCOUNTER — TELEPHONE (OUTPATIENT)
Dept: FAMILY MEDICINE CLINIC | Facility: CLINIC | Age: 55
End: 2020-09-09

## 2020-09-09 DIAGNOSIS — M19.042 OSTEOARTHRITIS OF LEFT HAND, UNSPECIFIED OSTEOARTHRITIS TYPE: Primary | ICD-10-CM

## 2020-09-10 ENCOUNTER — OFFICE VISIT (OUTPATIENT)
Dept: PHYSICAL THERAPY | Age: 55
End: 2020-09-10
Attending: FAMILY MEDICINE
Payer: COMMERCIAL

## 2020-09-10 PROCEDURE — 97140 MANUAL THERAPY 1/> REGIONS: CPT

## 2020-09-10 PROCEDURE — 97110 THERAPEUTIC EXERCISES: CPT

## 2020-09-10 NOTE — PROGRESS NOTES
Dx: Neck pain (M54.2)      Inflammatory heel pain, unspecified laterality (X43.064         Insurance (Authorized # of Visits):  9 visits approved           Authorizing Physician: Dr. Pradip Porter  Next MD visit: none scheduled  Fall Risk: standard         Bonnie Hilario slides x10   RTB scapular rows and high rows x15 ea RTB scapular rows and high rows x20 ea  GTB scapular low rows and high rows x20 ea  GTB scapular low rows and high rows x20 ea TRX: low rows, high rows x10 ea   bilat gastroc stretches 3x30 sec  bilat gas

## 2020-09-11 ENCOUNTER — APPOINTMENT (OUTPATIENT)
Dept: PHYSICAL THERAPY | Age: 55
End: 2020-09-11
Attending: FAMILY MEDICINE
Payer: COMMERCIAL

## 2020-09-15 ENCOUNTER — OFFICE VISIT (OUTPATIENT)
Dept: PHYSICAL THERAPY | Age: 55
End: 2020-09-15
Attending: FAMILY MEDICINE
Payer: COMMERCIAL

## 2020-09-15 PROCEDURE — 97110 THERAPEUTIC EXERCISES: CPT

## 2020-09-15 PROCEDURE — 97140 MANUAL THERAPY 1/> REGIONS: CPT

## 2020-09-15 NOTE — PROGRESS NOTES
Dx: Neck pain (M54.2)      Inflammatory heel pain, unspecified laterality (M62.047         Insurance (Authorized # of Visits):  9 visits approved           Authorizing Physician: Dr. Marjorie Estrella  Next MD visit: none scheduled  Fall Risk: standard         Shawanda Stahl with plus x12  Wall press ups with plus x15  Wall press ups with plus x15 Wall press ups with plus 2x10  Wall press ups with plus 2x10   Back to wall posture correction with arm slides x10  Back to wall posture correction with arm slides x10  Back to wall mobilization Gr lll-lV  Thoracic spine lateral mobilizations Grlll x5 min   Supine: IASTM L plantar fascia x 5 min  Supine: IASTM L plantar fascia x 5 min  Supine: IASTM L plantar fascia x 5 min  Supine: IASTM L plantar fascia x 5 min  Supine: IASTM L plan

## 2020-09-21 ENCOUNTER — OFFICE VISIT (OUTPATIENT)
Dept: PHYSICAL THERAPY | Age: 55
End: 2020-09-21
Attending: FAMILY MEDICINE
Payer: COMMERCIAL

## 2020-09-21 PROCEDURE — 97110 THERAPEUTIC EXERCISES: CPT

## 2020-09-21 PROCEDURE — 97140 MANUAL THERAPY 1/> REGIONS: CPT

## 2020-09-21 NOTE — PROGRESS NOTES
Dx: Neck pain (M54.2)      Inflammatory heel pain, unspecified laterality (R54.136         Insurance (Authorized # of Visits):  9 visits approved           Authorizing Physician: Dr. Christian Sites  Next MD visit: none scheduled  Fall Risk: standard         Jess Stamp ups with plus 2x10 Wall press ups with plus 2x10    Back to wall posture correction with arm slides x10  Back to wall posture correction with arm slides x10  Back to wall posture correction with arm slides x10  Back to wall posture correction with arm slid min  Total Treatment Time: 55 min

## 2020-09-23 ENCOUNTER — OFFICE VISIT (OUTPATIENT)
Dept: PHYSICAL THERAPY | Age: 55
End: 2020-09-23
Attending: FAMILY MEDICINE
Payer: COMMERCIAL

## 2020-09-23 PROCEDURE — 97140 MANUAL THERAPY 1/> REGIONS: CPT

## 2020-09-23 PROCEDURE — 97110 THERAPEUTIC EXERCISES: CPT

## 2020-09-23 NOTE — PROGRESS NOTES
Dx: Neck pain (M54.2)      Inflammatory heel pain, unspecified laterality (X05.155         Insurance (Authorized # of Visits):  9 visits approved           Authorizing Physician: Dr. Areli Tomas MD visit: none scheduled  Fall Risk: standard         Jill Sons with arm slides x10 Back to wall posture correction with arm slides x10 Back to wall posture correction with arm slides x10    GTB scapular low rows and high rows x20 ea  GTB scapular low rows and high rows x20 ea TRX: low rows, high rows x10 ea GTB scapul HEP    Charges: Ex 1 MT 2       Total Timed Treatment: 45 min  Total Treatment Time: 55 min

## 2020-09-25 ENCOUNTER — OFFICE VISIT (OUTPATIENT)
Dept: OCCUPATIONAL MEDICINE | Age: 55
End: 2020-09-25
Attending: FAMILY MEDICINE
Payer: COMMERCIAL

## 2020-09-25 DIAGNOSIS — M19.042 OSTEOARTHRITIS OF LEFT HAND, UNSPECIFIED OSTEOARTHRITIS TYPE: ICD-10-CM

## 2020-09-25 PROCEDURE — 97110 THERAPEUTIC EXERCISES: CPT

## 2020-09-25 PROCEDURE — 97165 OT EVAL LOW COMPLEX 30 MIN: CPT

## 2020-09-25 NOTE — PROGRESS NOTES
OCCUPATIONAL THERAPY UPPER EXTREMITY EVALUATION   Referring Physician: Dr. Kel Hatch  Diagnosis: OA left hand    Date of Service: 9/25/2020     PATIENT SUMMARY   Rosy Ortega is a 63year old female who presents to therapy today with complaints of ha old trauma. Dictated by: Giovani Greene MD on 7/08/2020 at 10:43 AM         Pt goals include decrease pain and limit further damage. Past medical history was reviewed with Rosy.  Significant findings include Acute maxillary sinusitis, Atypical impairments and reach functional goals.      Precautions: Drug Allergy  OBJECTIVE    OBSERVATION: Arthritic changes at base of thumb    ORTHOTICS: pt has been using a wrist brace which has not been helping the pain    SCAR: NA     SENSORY: WNL at time of ev sleep at least 6 hours without waking due to thumb pain or increased tingling. Pt will increase left 3 point pinch to at least 12 lb for use while opening containers.    Pt will increase D1 RA to at least 67 deg for use while pushing self up from a chair

## 2020-09-28 ENCOUNTER — OFFICE VISIT (OUTPATIENT)
Dept: FAMILY MEDICINE CLINIC | Facility: CLINIC | Age: 55
End: 2020-09-28
Payer: COMMERCIAL

## 2020-09-28 VITALS
WEIGHT: 174 LBS | RESPIRATION RATE: 16 BRPM | TEMPERATURE: 98 F | OXYGEN SATURATION: 98 % | BODY MASS INDEX: 27.97 KG/M2 | HEIGHT: 66 IN

## 2020-09-28 DIAGNOSIS — L91.8 SKIN TAG: ICD-10-CM

## 2020-09-28 DIAGNOSIS — L72.3 SEBACEOUS CYST: Primary | ICD-10-CM

## 2020-09-28 PROCEDURE — 11200 RMVL SKIN TAGS UP TO&INC 15: CPT | Performed by: FAMILY MEDICINE

## 2020-09-28 PROCEDURE — 11422 EXC H-F-NK-SP B9+MARG 1.1-2: CPT | Performed by: FAMILY MEDICINE

## 2020-09-28 PROCEDURE — 3008F BODY MASS INDEX DOCD: CPT | Performed by: FAMILY MEDICINE

## 2020-09-28 RX ORDER — MONTELUKAST SODIUM 10 MG/1
TABLET ORAL
COMMUNITY
Start: 2020-09-19 | End: 2021-05-25

## 2020-09-28 NOTE — PROGRESS NOTES
Procedure Note for Skin lesion removal   Location: right anterior scalp   Indication: irritation   This procedure and the risks and benefits were discussed with the patient and verbal consent was obtained.   Preparation with iodine   Characteristics of lesi

## 2020-09-29 ENCOUNTER — OFFICE VISIT (OUTPATIENT)
Dept: PHYSICAL THERAPY | Age: 55
End: 2020-09-29
Attending: FAMILY MEDICINE
Payer: COMMERCIAL

## 2020-09-29 PROCEDURE — 97110 THERAPEUTIC EXERCISES: CPT

## 2020-09-29 PROCEDURE — 97140 MANUAL THERAPY 1/> REGIONS: CPT

## 2020-09-29 NOTE — PROGRESS NOTES
Dx: Neck pain (M54.2)      Inflammatory heel pain, unspecified laterality (R87.518         Insurance (Authorized # of Visits):  9 visits approved           Authorizing Physician: Dr. Lee Christopher  Next MD visit: none scheduled  Fall Risk: standard         Destinee Gin ease descending stairs without compensation. Met   · Pt to report ability to walk following prolonged rest with minimal to no pain. Met   · Pt will be independent and compliant with comprehensive HEP to maintain progress achieved in PT.  Met    Plan: Recomm cervical distraction, PROM and stretches x15 min Supine: cervical distraction, PROM and stretches x15 min   Prone: thoracic spine mobilization Gr lll-lV  Thoracic spine lateral mobilizations Grlll x5 min Prone: thoracic spine mobilization Gr lll-lV x5 min

## 2020-10-02 ENCOUNTER — OFFICE VISIT (OUTPATIENT)
Dept: FAMILY MEDICINE CLINIC | Facility: CLINIC | Age: 55
End: 2020-10-02
Payer: COMMERCIAL

## 2020-10-02 ENCOUNTER — OFFICE VISIT (OUTPATIENT)
Dept: OCCUPATIONAL MEDICINE | Age: 55
End: 2020-10-02
Attending: FAMILY MEDICINE
Payer: COMMERCIAL

## 2020-10-02 VITALS
TEMPERATURE: 97 F | SYSTOLIC BLOOD PRESSURE: 112 MMHG | HEART RATE: 75 BPM | HEIGHT: 66 IN | WEIGHT: 174 LBS | BODY MASS INDEX: 27.97 KG/M2 | RESPIRATION RATE: 16 BRPM | DIASTOLIC BLOOD PRESSURE: 74 MMHG | OXYGEN SATURATION: 98 %

## 2020-10-02 DIAGNOSIS — Z12.31 ENCOUNTER FOR SCREENING MAMMOGRAM FOR HIGH-RISK PATIENT: ICD-10-CM

## 2020-10-02 DIAGNOSIS — Z48.02 VISIT FOR SUTURE REMOVAL: Primary | ICD-10-CM

## 2020-10-02 PROCEDURE — 97110 THERAPEUTIC EXERCISES: CPT

## 2020-10-02 PROCEDURE — 3008F BODY MASS INDEX DOCD: CPT | Performed by: FAMILY MEDICINE

## 2020-10-02 PROCEDURE — 97140 MANUAL THERAPY 1/> REGIONS: CPT

## 2020-10-02 PROCEDURE — 3078F DIAST BP <80 MM HG: CPT | Performed by: FAMILY MEDICINE

## 2020-10-02 PROCEDURE — 3074F SYST BP LT 130 MM HG: CPT | Performed by: FAMILY MEDICINE

## 2020-10-02 PROCEDURE — 97022 WHIRLPOOL THERAPY: CPT

## 2020-10-02 NOTE — PROGRESS NOTES
Dx: 5         Insurance (Authorized # of Visits):  Adolfo Higgins 97 Physician: Dr. Hattie Loeps  Next MD visit: none scheduled  Fall Risk: standard         Precautions: n/a             Subjective: \"Pain is the same.  Hurts with certain matias

## 2020-10-02 NOTE — PROGRESS NOTES
Pt here for suture removal.  Placed here   Injury no   No redness, warmth or wound discharge.     /74   Pulse 75   Temp 97.3 °F (36.3 °C) (Skin)   Resp 16   Ht 66\"   Wt 174 lb (78.9 kg)   SpO2 98%   BMI 28.08 kg/m²     Gen: Alert, pleasant, NAD  Ri

## 2020-10-06 ENCOUNTER — APPOINTMENT (OUTPATIENT)
Dept: OCCUPATIONAL MEDICINE | Age: 55
End: 2020-10-06
Attending: FAMILY MEDICINE
Payer: COMMERCIAL

## 2020-10-09 ENCOUNTER — OFFICE VISIT (OUTPATIENT)
Dept: OCCUPATIONAL MEDICINE | Age: 55
End: 2020-10-09
Attending: FAMILY MEDICINE
Payer: COMMERCIAL

## 2020-10-09 PROCEDURE — 97760 ORTHOTIC MGMT&TRAING 1ST ENC: CPT

## 2020-10-09 PROCEDURE — 97110 THERAPEUTIC EXERCISES: CPT

## 2020-10-09 NOTE — PROGRESS NOTES
Dx: 5         Insurance (Authorized # of Visits):  Adolfo Higgins 97 Physician: Dr. Leana Griggs  Next MD visit: none scheduled  Fall Risk: standard         Precautions: n/a             Subjective: \"It's a little painful today, maybe I did manage, 1 TE       Total Timed Treatment: 45 min  Total Treatment Time: 45 min

## 2020-10-13 ENCOUNTER — OFFICE VISIT (OUTPATIENT)
Dept: OCCUPATIONAL MEDICINE | Age: 55
End: 2020-10-13
Attending: FAMILY MEDICINE
Payer: COMMERCIAL

## 2020-10-13 PROCEDURE — 97035 APP MDLTY 1+ULTRASOUND EA 15: CPT

## 2020-10-13 PROCEDURE — 97140 MANUAL THERAPY 1/> REGIONS: CPT

## 2020-10-13 PROCEDURE — 97110 THERAPEUTIC EXERCISES: CPT

## 2020-10-13 NOTE — PROGRESS NOTES
Dx: 5         Insurance (Authorized # of Visits):  Adolfo Higgins 97 Physician: Dr. Tana Briceno  Next MD visit: none scheduled  Fall Risk: standard         Precautions: n/a             Subjective: Pt feeling that the splint is restricting t mobilization to thumb, CMC followed by A/AA/PROM     In hand manipulation with chip  Yellow band ex  -digit/thumb ext (big c, little c)  -1 DOI  -thumb flexion  -thumb RA  Each x 20       Roll/pinch yellow foam cubes     HEP: HEP upgrades in bold    Charge

## 2020-10-16 ENCOUNTER — TELEPHONE (OUTPATIENT)
Dept: OCCUPATIONAL MEDICINE | Age: 55
End: 2020-10-16

## 2020-10-16 ENCOUNTER — APPOINTMENT (OUTPATIENT)
Dept: OCCUPATIONAL MEDICINE | Age: 55
End: 2020-10-16
Attending: FAMILY MEDICINE
Payer: COMMERCIAL

## 2020-10-20 ENCOUNTER — OFFICE VISIT (OUTPATIENT)
Dept: OCCUPATIONAL MEDICINE | Age: 55
End: 2020-10-20
Attending: FAMILY MEDICINE
Payer: COMMERCIAL

## 2020-10-20 PROCEDURE — 97035 APP MDLTY 1+ULTRASOUND EA 15: CPT

## 2020-10-20 PROCEDURE — 97110 THERAPEUTIC EXERCISES: CPT

## 2020-10-20 PROCEDURE — 97140 MANUAL THERAPY 1/> REGIONS: CPT

## 2020-10-20 NOTE — PROGRESS NOTES
Dx: 5         Insurance (Authorized # of Visits):  Adolfo Higgins 97 Physician: Dr. Neda Rivers  Next MD visit: none scheduled  Fall Risk: standard         Precautions: n/a             Subjective:  \"I think this splint is too restrictive\" pinch  -lateral pinch Digiflex green full  x 20  Red each digit x 10  Tripod pinch x 20  Green, lateral pinch x 20 Gentle joint mobilization to thumb, CMC followed by A/AA/PROM Isometric ex to thumb:  CMC adduction x 10  CMC abduction x 10  Thumb flexi

## 2020-10-26 ENCOUNTER — OFFICE VISIT (OUTPATIENT)
Dept: OCCUPATIONAL MEDICINE | Age: 55
End: 2020-10-26
Attending: FAMILY MEDICINE
Payer: COMMERCIAL

## 2020-10-26 PROCEDURE — 97140 MANUAL THERAPY 1/> REGIONS: CPT

## 2020-10-26 PROCEDURE — 97035 APP MDLTY 1+ULTRASOUND EA 15: CPT

## 2020-10-26 NOTE — PROGRESS NOTES
Dx: 5         Insurance (Authorized # of Visits):  Adolfo Higgins 97 Physician: Dr. Daniel Peraza  Next MD visit: none scheduled  Fall Risk: standard         Precautions: n/a              Discharge Summary  Pt has attended 6 visits in [facility] under this plan of treatment and while under my care.     X___________________________________________________ Date____________________    Certification From: 14/33/5750  To:1/24/2021    Date: 10/2/2020  TX#: 2/9 Date:  10/9/20               TX#: 3/9 Date:

## 2020-11-24 ENCOUNTER — HOSPITAL ENCOUNTER (OUTPATIENT)
Dept: MAMMOGRAPHY | Age: 55
Discharge: HOME | End: 2020-11-24
Attending: FAMILY MEDICINE
Payer: COMMERCIAL

## 2020-11-24 DIAGNOSIS — Z12.31 ENCOUNTER FOR SCREENING MAMMOGRAM FOR HIGH-RISK PATIENT: ICD-10-CM

## 2020-11-24 PROCEDURE — 77067 SCR MAMMO BI INCL CAD: CPT | Performed by: FAMILY MEDICINE

## 2020-11-24 PROCEDURE — 77063 BREAST TOMOSYNTHESIS BI: CPT | Performed by: FAMILY MEDICINE

## 2020-12-01 ENCOUNTER — LAB ENCOUNTER (OUTPATIENT)
Dept: LAB | Age: 55
End: 2020-12-01
Attending: FAMILY MEDICINE
Payer: COMMERCIAL

## 2020-12-01 DIAGNOSIS — E78.00 ELEVATED CHOLESTEROL: ICD-10-CM

## 2020-12-01 DIAGNOSIS — R73.03 PRE-DIABETES: ICD-10-CM

## 2020-12-01 PROCEDURE — 80053 COMPREHEN METABOLIC PANEL: CPT

## 2020-12-01 PROCEDURE — 80061 LIPID PANEL: CPT

## 2020-12-01 PROCEDURE — 83036 HEMOGLOBIN GLYCOSYLATED A1C: CPT

## 2020-12-01 PROCEDURE — 36415 COLL VENOUS BLD VENIPUNCTURE: CPT

## 2020-12-02 ENCOUNTER — TELEPHONE (OUTPATIENT)
Dept: FAMILY MEDICINE CLINIC | Facility: CLINIC | Age: 55
End: 2020-12-02

## 2020-12-02 DIAGNOSIS — E78.00 ELEVATED CHOLESTEROL: Primary | ICD-10-CM

## 2020-12-02 DIAGNOSIS — R73.03 PRE-DIABETES: ICD-10-CM

## 2020-12-02 DIAGNOSIS — Z12.31 ENCOUNTER FOR SCREENING MAMMOGRAM FOR HIGH-RISK PATIENT: Primary | ICD-10-CM

## 2021-01-21 ENCOUNTER — OFFICE VISIT (OUTPATIENT)
Dept: FAMILY MEDICINE CLINIC | Facility: CLINIC | Age: 56
End: 2021-01-21
Payer: COMMERCIAL

## 2021-01-21 VITALS
RESPIRATION RATE: 18 BRPM | TEMPERATURE: 98 F | SYSTOLIC BLOOD PRESSURE: 117 MMHG | WEIGHT: 175 LBS | OXYGEN SATURATION: 99 % | HEART RATE: 109 BPM | BODY MASS INDEX: 28.13 KG/M2 | DIASTOLIC BLOOD PRESSURE: 95 MMHG | HEIGHT: 66 IN

## 2021-01-21 DIAGNOSIS — Z20.822 EXPOSURE TO COVID-19 VIRUS: Primary | ICD-10-CM

## 2021-01-21 PROCEDURE — 3008F BODY MASS INDEX DOCD: CPT | Performed by: NURSE PRACTITIONER

## 2021-01-21 PROCEDURE — 3080F DIAST BP >= 90 MM HG: CPT | Performed by: NURSE PRACTITIONER

## 2021-01-21 PROCEDURE — 99213 OFFICE O/P EST LOW 20 MIN: CPT | Performed by: NURSE PRACTITIONER

## 2021-01-21 PROCEDURE — 3074F SYST BP LT 130 MM HG: CPT | Performed by: NURSE PRACTITIONER

## 2021-01-21 NOTE — PROGRESS NOTES
CHIEF COMPLAINT:   Patient presents with:  Covid    HPI:   Rosy Ware is a 63year old female who presents for Covid 19 exposure 6 days ago. Reports no symptoms. Requesting covid testing.   At this time, not experiencing upper respiratory sympto • NEEDLE BIOPSY RIGHT Right 12/2011    US guided bx  benign   • OTHER SURGICAL HISTORY      thyroid surgery   • THYROIDECTOMY  2008   • TRANSFORAMINAL EPIDURAL - LUMBAR Right 12/23/2015    Performed by Zuleima Irizarry MD at [address] [place] [address] Discussed asymptomatic exposure guidelines:   -Quarantine 14 days from last significant exposure, doesn't change based on negative results     To f/u with Clinic / PCP if any problems or if symptoms begin after testing negative.        Meds & Refills for th · If you need to go to a hospital or clinic, expect that the healthcare staff will wear protective equipment such as masks, gowns, gloves, and eye protection. You may be advised to wait in or enter through a separate area.  This is to prevent the possible v · If you need to cough or sneeze, do it into a tissue. Then throw the tissue into the trash. If you don't have tissues, cough or sneeze into the bend of your elbow. · Wash your hands often.     Self-care at [facility]  The FDA has approved a vaccine to prevent If you've been in the hospital for suspected or confirmed COVID-19 and now are home, follow all of your healthcare team's instructions. This will include when it's OK to stop self-isolation.  You may also get instructions on position changes to help your br Your limits are different if you've had COVID-19 in the last 3 months but are fully recovered without symptoms and you have been exposed to someone with COVID-19. If you are symptom-free, you don't need to stay home away from others or be retested.  The CDC When you return to public settings  When you are well enough to go outside your home, consider the CDC's guidance on cloth face masks:     · The CDC advises all people over age 2 to wear cloth face masks in public settings when around people outside of the © 8988-7959 [employer] [address]. All rights reserved. This information is not intended as a substitute for professional medical care. Always follow your healthcare professional's instructions.             The patient indicates understanding of these i

## 2021-01-21 NOTE — PATIENT INSTRUCTIONS
Coronavirus Disease 2019 (COVID-19): Caring for Yourself or Others  If you or a household member have symptoms of COVID-19, follow these guidelines for preventing spread of the virus, and managing symptoms.   If you think you have COVID-19 symptoms  · Sta · Tell the healthcare staff about recent travel. This includes local travel on public transport. Staff may need to find other people you have been in contact with. · Follow all instructions the healthcare staff give you.     If you have been diagnosed with The FDA has approved a vaccine to prevent COVID-19 in people 16 years and older who are not pregnant or breastfeeding. It's not currently available to the entire public.  The first phase of vaccine roll-out will go to healthcare staff and residents of long- If you've had confirmed COVID-19, your healthcare team may ask you to consider donating your plasma. This is called COVID-19 convalescent plasma donation.  Plasma from people fully recovered from COVID-19 may contain antibodies to help fight COVID-19 in peo Your limits are different if you've had COVID-19 in the last 3 months but are fully recovered without symptoms and you have been exposed to someone with COVID-19. If you are symptom-free, you don't need to stay home away from others or be retested.  The CDC When you return to public settings  When you are well enough to go outside your home, consider the CDC's guidance on cloth face masks:     · The CDC advises all people over age 2 to wear cloth face masks in public settings when around people outside of the © 6900-1920 [employer] [address]. All rights reserved. This information is not intended as a substitute for professional medical care. Always follow your healthcare professional's instructions.

## 2021-01-23 LAB — SARS-COV-2 RNA RESP QL NAA+PROBE: DETECTED

## 2021-04-09 DIAGNOSIS — Z23 NEED FOR VACCINATION: ICD-10-CM

## 2021-04-12 ENCOUNTER — LAB ENCOUNTER (OUTPATIENT)
Dept: LAB | Age: 56
End: 2021-04-12
Attending: FAMILY MEDICINE
Payer: COMMERCIAL

## 2021-04-12 DIAGNOSIS — R73.03 PRE-DIABETES: ICD-10-CM

## 2021-04-12 DIAGNOSIS — E78.00 ELEVATED CHOLESTEROL: ICD-10-CM

## 2021-04-12 PROCEDURE — 80061 LIPID PANEL: CPT | Performed by: FAMILY MEDICINE

## 2021-04-12 PROCEDURE — 36415 COLL VENOUS BLD VENIPUNCTURE: CPT | Performed by: FAMILY MEDICINE

## 2021-04-12 PROCEDURE — 80053 COMPREHEN METABOLIC PANEL: CPT | Performed by: FAMILY MEDICINE

## 2021-04-12 PROCEDURE — 83036 HEMOGLOBIN GLYCOSYLATED A1C: CPT | Performed by: FAMILY MEDICINE

## 2021-04-13 DIAGNOSIS — E78.00 ELEVATED CHOLESTEROL: ICD-10-CM

## 2021-04-13 DIAGNOSIS — R73.03 PRE-DIABETES: Primary | ICD-10-CM

## 2021-05-08 ENCOUNTER — PATIENT MESSAGE (OUTPATIENT)
Dept: FAMILY MEDICINE CLINIC | Facility: CLINIC | Age: 56
End: 2021-05-08

## 2021-05-10 RX ORDER — LEVOTHYROXINE SODIUM 88 UG/1
TABLET ORAL
Qty: 90 TABLET | Refills: 0 | Status: SHIPPED | OUTPATIENT
Start: 2021-05-10 | End: 2021-08-05

## 2021-05-10 NOTE — TELEPHONE ENCOUNTER
From: Kristy Botello  To: Kenna Bernardo DO  Sent: 5/8/2021 6:29 PM CDT  Subject: Prescription Question    Hi Dr Steven Cortes   My Levothyroxine medicine is running low and because of COVID I haven't seen my endocrinologist for one year, and then he moved o

## 2021-05-14 ENCOUNTER — IMMUNIZATION (OUTPATIENT)
Dept: LAB | Facility: HOSPITAL | Age: 56
End: 2021-05-14
Attending: EMERGENCY MEDICINE
Payer: COMMERCIAL

## 2021-05-14 DIAGNOSIS — Z23 NEED FOR VACCINATION: Primary | ICD-10-CM

## 2021-05-14 PROCEDURE — 0001A SARSCOV2 VAC 30MCG/0.3ML IM: CPT

## 2021-05-25 ENCOUNTER — OFFICE VISIT (OUTPATIENT)
Dept: FAMILY MEDICINE CLINIC | Facility: CLINIC | Age: 56
End: 2021-05-25
Payer: COMMERCIAL

## 2021-05-25 ENCOUNTER — LAB ENCOUNTER (OUTPATIENT)
Dept: LAB | Age: 56
End: 2021-05-25
Attending: FAMILY MEDICINE
Payer: COMMERCIAL

## 2021-05-25 VITALS
HEIGHT: 66 IN | BODY MASS INDEX: 27.8 KG/M2 | HEART RATE: 97 BPM | SYSTOLIC BLOOD PRESSURE: 118 MMHG | DIASTOLIC BLOOD PRESSURE: 68 MMHG | WEIGHT: 173 LBS

## 2021-05-25 DIAGNOSIS — R10.13 EPIGASTRIC PAIN: Primary | ICD-10-CM

## 2021-05-25 DIAGNOSIS — R10.13 EPIGASTRIC PAIN: ICD-10-CM

## 2021-05-25 DIAGNOSIS — R10.2 PELVIC PAIN: ICD-10-CM

## 2021-05-25 DIAGNOSIS — K59.09 CHRONIC CONSTIPATION: ICD-10-CM

## 2021-05-25 PROCEDURE — 83690 ASSAY OF LIPASE: CPT | Performed by: FAMILY MEDICINE

## 2021-05-25 PROCEDURE — 85025 COMPLETE CBC W/AUTO DIFF WBC: CPT | Performed by: FAMILY MEDICINE

## 2021-05-25 PROCEDURE — 99214 OFFICE O/P EST MOD 30 MIN: CPT | Performed by: FAMILY MEDICINE

## 2021-05-25 PROCEDURE — 3078F DIAST BP <80 MM HG: CPT | Performed by: FAMILY MEDICINE

## 2021-05-25 PROCEDURE — 81003 URINALYSIS AUTO W/O SCOPE: CPT | Performed by: FAMILY MEDICINE

## 2021-05-25 PROCEDURE — 80053 COMPREHEN METABOLIC PANEL: CPT | Performed by: FAMILY MEDICINE

## 2021-05-25 PROCEDURE — 3074F SYST BP LT 130 MM HG: CPT | Performed by: FAMILY MEDICINE

## 2021-05-25 PROCEDURE — 3008F BODY MASS INDEX DOCD: CPT | Performed by: FAMILY MEDICINE

## 2021-05-25 PROCEDURE — 87086 URINE CULTURE/COLONY COUNT: CPT | Performed by: FAMILY MEDICINE

## 2021-05-25 NOTE — PROGRESS NOTES
HPI:   Iris Thornton is a 63year old female here with back and abd pain     Pain started 3 days ago   Pt will start in upper abd pain and it will radiate into back on both sides R>L  Pt c/o GERD and constipation   Pt is taking nexium otc / taking at [facility] NDL/CATH SPI DX/THER NJX N/A 2/1/2016    Procedure: LUMBAR EPIDURAL;  Surgeon: Sherif Koch MD;  Location: [facility]   • INJECTION, ANESTHETIC/STEROID, TRANSFORAMINAL EPIDURAL; LUMBAR/SACRAL, SINGLE LEVEL Right 12/23/2015 lb (78.5 kg)   BMI 27.92 kg/m²   Body mass index is 27.92 kg/m².    GENERAL: alert and oriented X 3, well developed, well nourished,in no apparent distress  SKIN: norashes,no suspicious lesions  EXTREMITIES: no cyanosis, clubbing or edema  HEART: normal   L

## 2021-06-09 ENCOUNTER — IMMUNIZATION (OUTPATIENT)
Dept: LAB | Facility: HOSPITAL | Age: 56
End: 2021-06-09
Attending: EMERGENCY MEDICINE
Payer: COMMERCIAL

## 2021-06-09 DIAGNOSIS — Z23 NEED FOR VACCINATION: Primary | ICD-10-CM

## 2021-06-09 PROCEDURE — 0002A SARSCOV2 VAC 30MCG/0.3ML IM: CPT

## 2021-06-10 ENCOUNTER — HOSPITAL ENCOUNTER (OUTPATIENT)
Dept: ULTRASOUND IMAGING | Age: 56
Discharge: HOME | End: 2021-06-10
Attending: FAMILY MEDICINE
Payer: COMMERCIAL

## 2021-06-10 DIAGNOSIS — R10.13 EPIGASTRIC PAIN: ICD-10-CM

## 2021-06-10 PROCEDURE — 76700 US EXAM ABDOM COMPLETE: CPT | Performed by: FAMILY MEDICINE

## 2021-06-18 ENCOUNTER — LABORATORY ENCOUNTER (OUTPATIENT)
Dept: LAB | Age: 56
End: 2021-06-18
Attending: FAMILY MEDICINE
Payer: COMMERCIAL

## 2021-06-18 DIAGNOSIS — E55.9 AVITAMINOSIS D: ICD-10-CM

## 2021-06-18 DIAGNOSIS — E78.00 ELEVATED CHOLESTEROL: ICD-10-CM

## 2021-06-18 DIAGNOSIS — R63.5 ABNORMAL WEIGHT GAIN: ICD-10-CM

## 2021-06-18 DIAGNOSIS — E06.3 AUTOIMMUNE LYMPHOCYTIC CHRONIC THYROIDITIS: Primary | ICD-10-CM

## 2021-06-18 DIAGNOSIS — R73.03 PRE-DIABETES: ICD-10-CM

## 2021-06-19 ENCOUNTER — HOSPITAL ENCOUNTER (OUTPATIENT)
Dept: ULTRASOUND IMAGING | Age: 56
Discharge: HOME | End: 2021-06-19
Attending: INTERNAL MEDICINE
Payer: COMMERCIAL

## 2021-06-19 DIAGNOSIS — E06.3 AUTOIMMUNE THYROIDITIS: ICD-10-CM

## 2021-06-19 PROCEDURE — 76536 US EXAM OF HEAD AND NECK: CPT | Performed by: INTERNAL MEDICINE

## 2021-06-24 ENCOUNTER — LAB ENCOUNTER (OUTPATIENT)
Dept: LAB | Age: 56
End: 2021-06-24
Attending: INTERNAL MEDICINE
Payer: COMMERCIAL

## 2021-06-24 DIAGNOSIS — E78.00 ELEVATED CHOLESTEROL: ICD-10-CM

## 2021-06-24 DIAGNOSIS — E06.3 AUTOIMMUNE THYROIDITIS: ICD-10-CM

## 2021-06-24 DIAGNOSIS — E55.9 AVITAMINOSIS D: ICD-10-CM

## 2021-06-24 DIAGNOSIS — R63.5 ABNORMAL WEIGHT GAIN: ICD-10-CM

## 2021-06-24 DIAGNOSIS — E06.3 AUTOIMMUNE LYMPHOCYTIC CHRONIC THYROIDITIS: ICD-10-CM

## 2021-06-24 DIAGNOSIS — R73.03 PRE-DIABETES: ICD-10-CM

## 2021-06-24 DIAGNOSIS — E55.9 VITAMIN D DEFICIENCY: Primary | ICD-10-CM

## 2021-06-24 PROCEDURE — 86800 THYROGLOBULIN ANTIBODY: CPT

## 2021-06-24 PROCEDURE — 84432 ASSAY OF THYROGLOBULIN: CPT

## 2021-06-24 PROCEDURE — 36415 COLL VENOUS BLD VENIPUNCTURE: CPT

## 2021-07-28 ENCOUNTER — LAB ENCOUNTER (OUTPATIENT)
Dept: LAB | Age: 56
End: 2021-07-28
Attending: FAMILY MEDICINE
Payer: COMMERCIAL

## 2021-07-28 LAB
CHOLEST SMN-MCNC: 195 MG/DL (ref ?–200)
HDLC SERPL-MCNC: 58 MG/DL (ref 40–59)
LDLC SERPL CALC-MCNC: 119 MG/DL (ref ?–100)
NONHDLC SERPL-MCNC: 137 MG/DL (ref ?–130)
PATIENT FASTING Y/N/NP: YES
TRIGL SERPL-MCNC: 99 MG/DL (ref 30–149)
VLDLC SERPL CALC-MCNC: 17 MG/DL (ref 0–30)

## 2021-07-28 PROCEDURE — 80061 LIPID PANEL: CPT | Performed by: FAMILY MEDICINE

## 2021-08-05 RX ORDER — LEVOTHYROXINE SODIUM 88 UG/1
TABLET ORAL
Qty: 90 TABLET | Refills: 0 | Status: SHIPPED | OUTPATIENT
Start: 2021-08-05

## 2021-08-05 NOTE — TELEPHONE ENCOUNTER
Rx Request  Levothyroxine Sodium 88 MCG Oral Tab    Disp:    90                R: 0    Last Refilled: 05/10/2021    Last Visit: 05/25/2021    Protocol Passed?  Yes[ x ]       No[  ]

## 2021-11-22 DIAGNOSIS — H65.113 ACUTE ALLERGIC SEROUS OTITIS MEDIA, BILATERAL: ICD-10-CM

## 2021-11-22 RX ORDER — MONTELUKAST SODIUM 10 MG/1
TABLET ORAL
Qty: 90 TABLET | Refills: 1 | OUTPATIENT
Start: 2021-11-22

## 2021-11-29 ENCOUNTER — HOSPITAL ENCOUNTER (OUTPATIENT)
Dept: MAMMOGRAPHY | Age: 56
Discharge: HOME | End: 2021-11-29
Attending: FAMILY MEDICINE
Payer: COMMERCIAL

## 2021-11-29 DIAGNOSIS — Z12.31 ENCOUNTER FOR SCREENING MAMMOGRAM FOR HIGH-RISK PATIENT: ICD-10-CM

## 2021-11-29 PROCEDURE — 77063 BREAST TOMOSYNTHESIS BI: CPT | Performed by: FAMILY MEDICINE

## 2021-11-29 PROCEDURE — 77067 SCR MAMMO BI INCL CAD: CPT | Performed by: FAMILY MEDICINE

## 2021-12-10 ENCOUNTER — IMMUNIZATION (OUTPATIENT)
Dept: LAB | Facility: HOSPITAL | Age: 56
End: 2021-12-10
Attending: EMERGENCY MEDICINE
Payer: COMMERCIAL

## 2021-12-10 DIAGNOSIS — Z23 NEED FOR VACCINATION: Primary | ICD-10-CM

## 2021-12-10 PROCEDURE — 0004A SARSCOV2 VAC 30MCG/0.3ML IM: CPT

## 2021-12-22 ENCOUNTER — LAB ENCOUNTER (OUTPATIENT)
Dept: LAB | Age: 56
End: 2021-12-22
Attending: FAMILY MEDICINE
Payer: COMMERCIAL

## 2022-02-07 ENCOUNTER — LAB ENCOUNTER (OUTPATIENT)
Dept: LAB | Age: 57
End: 2022-02-07
Attending: FAMILY MEDICINE
Payer: COMMERCIAL

## 2022-02-07 DIAGNOSIS — R73.03 PRE-DIABETES: ICD-10-CM

## 2022-02-07 DIAGNOSIS — E78.00 ELEVATED LDL CHOLESTEROL LEVEL: ICD-10-CM

## 2022-02-07 DIAGNOSIS — E78.9 SERUM CHOLESTEROL ELEVATED: ICD-10-CM

## 2022-02-07 DIAGNOSIS — E78.00 ELEVATED CHOLESTEROL: ICD-10-CM

## 2022-02-07 LAB
ALBUMIN SERPL-MCNC: 3.8 G/DL (ref 3.4–5)
ALBUMIN/GLOB SERPL: 1 {RATIO} (ref 1–2)
ALP LIVER SERPL-CCNC: 72 U/L
ALT SERPL-CCNC: 21 U/L
ANION GAP SERPL CALC-SCNC: 7 MMOL/L (ref 0–18)
AST SERPL-CCNC: 14 U/L (ref 15–37)
BILIRUB SERPL-MCNC: 0.4 MG/DL (ref 0.1–2)
BUN BLD-MCNC: 25 MG/DL (ref 7–18)
CALCIUM BLD-MCNC: 9.3 MG/DL (ref 8.5–10.1)
CHLORIDE SERPL-SCNC: 104 MMOL/L (ref 98–112)
CHOLEST SERPL-MCNC: 194 MG/DL (ref ?–200)
CO2 SERPL-SCNC: 29 MMOL/L (ref 21–32)
CREAT BLD-MCNC: 0.8 MG/DL
FASTING PATIENT LIPID ANSWER: YES
FASTING STATUS PATIENT QL REPORTED: YES
GLOBULIN PLAS-MCNC: 3.7 G/DL (ref 2.8–4.4)
GLUCOSE BLD-MCNC: 100 MG/DL (ref 70–99)
HDLC SERPL-MCNC: 62 MG/DL (ref 40–59)
LDLC SERPL CALC-MCNC: 115 MG/DL (ref ?–100)
NONHDLC SERPL-MCNC: 132 MG/DL (ref ?–130)
OSMOLALITY SERPL CALC.SUM OF ELEC: 294 MOSM/KG (ref 275–295)
POTASSIUM SERPL-SCNC: 4.4 MMOL/L (ref 3.5–5.1)
PROT SERPL-MCNC: 7.5 G/DL (ref 6.4–8.2)
SODIUM SERPL-SCNC: 140 MMOL/L (ref 136–145)
TRIGL SERPL-MCNC: 95 MG/DL (ref 30–149)
VLDLC SERPL CALC-MCNC: 16 MG/DL (ref 0–30)

## 2022-02-07 PROCEDURE — 36415 COLL VENOUS BLD VENIPUNCTURE: CPT

## 2022-02-07 PROCEDURE — 80053 COMPREHEN METABOLIC PANEL: CPT

## 2022-02-07 PROCEDURE — 80061 LIPID PANEL: CPT

## 2022-02-08 ENCOUNTER — HOSPITAL ENCOUNTER (OUTPATIENT)
Dept: GENERAL RADIOLOGY | Age: 57
Discharge: HOME | End: 2022-02-08
Attending: FAMILY MEDICINE
Payer: COMMERCIAL

## 2022-02-08 ENCOUNTER — OFFICE VISIT (OUTPATIENT)
Dept: FAMILY MEDICINE CLINIC | Facility: CLINIC | Age: 57
End: 2022-02-08
Payer: COMMERCIAL

## 2022-02-08 ENCOUNTER — LAB ENCOUNTER (OUTPATIENT)
Dept: LAB | Age: 57
End: 2022-02-08
Attending: FAMILY MEDICINE
Payer: COMMERCIAL

## 2022-02-08 VITALS
BODY MASS INDEX: 27.16 KG/M2 | DIASTOLIC BLOOD PRESSURE: 70 MMHG | RESPIRATION RATE: 20 BRPM | WEIGHT: 169 LBS | OXYGEN SATURATION: 99 % | SYSTOLIC BLOOD PRESSURE: 116 MMHG | HEIGHT: 66 IN | TEMPERATURE: 98 F | HEART RATE: 86 BPM

## 2022-02-08 DIAGNOSIS — M65.4 DE QUERVAIN'S TENOSYNOVITIS, BILATERAL: Primary | ICD-10-CM

## 2022-02-08 DIAGNOSIS — M19.049 HAND ARTHRITIS: ICD-10-CM

## 2022-02-08 DIAGNOSIS — M72.2 PLANTAR FASCIITIS: ICD-10-CM

## 2022-02-08 DIAGNOSIS — J45.20 MILD INTERMITTENT ASTHMA WITHOUT COMPLICATION: ICD-10-CM

## 2022-02-08 DIAGNOSIS — M19.079 ANKLE ARTHRITIS: ICD-10-CM

## 2022-02-08 PROCEDURE — 3078F DIAST BP <80 MM HG: CPT | Performed by: FAMILY MEDICINE

## 2022-02-08 PROCEDURE — 73110 X-RAY EXAM OF WRIST: CPT | Performed by: FAMILY MEDICINE

## 2022-02-08 PROCEDURE — 3074F SYST BP LT 130 MM HG: CPT | Performed by: FAMILY MEDICINE

## 2022-02-08 PROCEDURE — 3008F BODY MASS INDEX DOCD: CPT | Performed by: FAMILY MEDICINE

## 2022-02-08 PROCEDURE — 86431 RHEUMATOID FACTOR QUANT: CPT | Performed by: FAMILY MEDICINE

## 2022-02-08 PROCEDURE — 86200 CCP ANTIBODY: CPT | Performed by: FAMILY MEDICINE

## 2022-02-08 PROCEDURE — 99214 OFFICE O/P EST MOD 30 MIN: CPT | Performed by: FAMILY MEDICINE

## 2022-02-08 RX ORDER — LEVOTHYROXINE SODIUM 0.1 MG/1
100 TABLET ORAL
COMMUNITY

## 2022-02-08 RX ORDER — ALBUTEROL SULFATE 90 UG/1
2 AEROSOL, METERED RESPIRATORY (INHALATION) EVERY 4 HOURS PRN
Qty: 1 EACH | Refills: 1 | Status: SHIPPED | OUTPATIENT
Start: 2022-02-08

## 2022-02-11 LAB — CCP IGG SERPL-ACNC: 3.2 U/ML (ref 0–6.9)

## 2022-06-27 DIAGNOSIS — E03.9 HYPOTHYROIDISM, UNSPECIFIED TYPE: Primary | ICD-10-CM

## 2022-06-27 RX ORDER — LEVOTHYROXINE SODIUM 0.1 MG/1
100 TABLET ORAL DAILY
Qty: 90 TABLET | Refills: 0 | Status: SHIPPED | OUTPATIENT
Start: 2022-06-27 | End: 2022-06-30 | Stop reason: SDUPTHER

## 2022-06-29 ENCOUNTER — TELEPHONE (OUTPATIENT)
Dept: PEDIATRIC ENDOCRINOLOGY | Age: 57
End: 2022-06-29

## 2022-06-29 DIAGNOSIS — E03.9 HYPOTHYROIDISM, UNSPECIFIED TYPE: ICD-10-CM

## 2022-06-30 RX ORDER — LEVOTHYROXINE SODIUM 0.1 MG/1
100 TABLET ORAL DAILY
Qty: 90 TABLET | Refills: 0 | Status: SHIPPED | OUTPATIENT
Start: 2022-06-30 | End: 2023-02-01 | Stop reason: DRUGHIGH

## 2022-09-06 ENCOUNTER — TELEPHONE (OUTPATIENT)
Dept: PEDIATRIC ENDOCRINOLOGY | Age: 57
End: 2022-09-06

## 2022-09-06 DIAGNOSIS — E03.9 HYPOTHYROIDISM, UNSPECIFIED TYPE: Primary | ICD-10-CM

## 2022-09-07 RX ORDER — LEVOTHYROXINE SODIUM 0.1 MG/1
100 TABLET ORAL DAILY
Qty: 90 TABLET | Refills: 1 | Status: SHIPPED | OUTPATIENT
Start: 2022-09-07 | End: 2023-02-01 | Stop reason: DRUGHIGH

## 2022-09-16 ENCOUNTER — OFFICE VISIT (OUTPATIENT)
Dept: FAMILY MEDICINE CLINIC | Facility: CLINIC | Age: 57
End: 2022-09-16

## 2022-09-16 VITALS
OXYGEN SATURATION: 98 % | BODY MASS INDEX: 27.32 KG/M2 | RESPIRATION RATE: 20 BRPM | DIASTOLIC BLOOD PRESSURE: 70 MMHG | HEIGHT: 66 IN | HEART RATE: 71 BPM | SYSTOLIC BLOOD PRESSURE: 120 MMHG | WEIGHT: 170 LBS

## 2022-09-16 DIAGNOSIS — E78.5 HYPERLIPIDEMIA, UNSPECIFIED HYPERLIPIDEMIA TYPE: ICD-10-CM

## 2022-09-16 DIAGNOSIS — Z00.00 LABORATORY EXAMINATION ORDERED AS PART OF A COMPLETE PHYSICAL EXAMINATION: ICD-10-CM

## 2022-09-16 DIAGNOSIS — J45.20 MILD INTERMITTENT ASTHMA WITHOUT COMPLICATION: ICD-10-CM

## 2022-09-16 DIAGNOSIS — R10.9 FLANK PAIN: ICD-10-CM

## 2022-09-16 DIAGNOSIS — M54.2 CERVICAL PAIN (NECK): ICD-10-CM

## 2022-09-16 DIAGNOSIS — M54.42 CHRONIC BILATERAL LOW BACK PAIN WITH LEFT-SIDED SCIATICA: ICD-10-CM

## 2022-09-16 DIAGNOSIS — R30.0 DYSURIA: Primary | ICD-10-CM

## 2022-09-16 DIAGNOSIS — M54.6 CHRONIC BILATERAL THORACIC BACK PAIN: ICD-10-CM

## 2022-09-16 DIAGNOSIS — R73.03 PREDIABETES: ICD-10-CM

## 2022-09-16 DIAGNOSIS — G89.29 CHRONIC BILATERAL THORACIC BACK PAIN: ICD-10-CM

## 2022-09-16 DIAGNOSIS — G89.29 CHRONIC BILATERAL LOW BACK PAIN WITH LEFT-SIDED SCIATICA: ICD-10-CM

## 2022-09-16 DIAGNOSIS — H81.02 MENIERE'S DISEASE OF LEFT EAR: ICD-10-CM

## 2022-09-16 LAB
APPEARANCE: CLEAR
BILIRUBIN: NEGATIVE
GLUCOSE (URINE DIPSTICK): NEGATIVE MG/DL
KETONES (URINE DIPSTICK): NEGATIVE MG/DL
LEUKOCYTES: NEGATIVE
MULTISTIX LOT#: NORMAL NUMERIC
NITRITE, URINE: NEGATIVE
OCCULT BLOOD: NEGATIVE
PH, URINE: 6.5 (ref 4.5–8)
PROTEIN (URINE DIPSTICK): NEGATIVE MG/DL
SPECIFIC GRAVITY: 1.01 (ref 1–1.03)
URINE-COLOR: YELLOW
UROBILINOGEN,SEMI-QN: 0.2 MG/DL (ref 0–1.9)

## 2022-09-16 PROCEDURE — 3008F BODY MASS INDEX DOCD: CPT | Performed by: FAMILY MEDICINE

## 2022-09-16 PROCEDURE — 87086 URINE CULTURE/COLONY COUNT: CPT | Performed by: FAMILY MEDICINE

## 2022-09-16 PROCEDURE — 99214 OFFICE O/P EST MOD 30 MIN: CPT | Performed by: FAMILY MEDICINE

## 2022-09-16 PROCEDURE — 3074F SYST BP LT 130 MM HG: CPT | Performed by: FAMILY MEDICINE

## 2022-09-16 PROCEDURE — 3078F DIAST BP <80 MM HG: CPT | Performed by: FAMILY MEDICINE

## 2022-09-16 PROCEDURE — 81003 URINALYSIS AUTO W/O SCOPE: CPT | Performed by: FAMILY MEDICINE

## 2022-09-16 RX ORDER — ALBUTEROL SULFATE 90 UG/1
2 AEROSOL, METERED RESPIRATORY (INHALATION) EVERY 4 HOURS PRN
Qty: 1 EACH | Refills: 1 | COMMUNITY
Start: 2022-09-16

## 2022-09-16 RX ORDER — MECLIZINE HYDROCHLORIDE 25 MG/1
25 TABLET ORAL 3 TIMES DAILY PRN
Qty: 20 TABLET | Refills: 0 | Status: SHIPPED | OUTPATIENT
Start: 2022-09-16

## 2022-09-16 RX ORDER — FLUCONAZOLE 150 MG/1
150 TABLET ORAL ONCE
Qty: 1 TABLET | Refills: 0 | Status: SHIPPED | OUTPATIENT
Start: 2022-09-16 | End: 2022-09-16

## 2022-09-17 ENCOUNTER — LAB ENCOUNTER (OUTPATIENT)
Dept: LAB | Age: 57
End: 2022-09-17
Attending: FAMILY MEDICINE

## 2022-09-17 DIAGNOSIS — Z00.00 LABORATORY EXAMINATION ORDERED AS PART OF A COMPLETE PHYSICAL EXAMINATION: ICD-10-CM

## 2022-09-17 DIAGNOSIS — R73.03 PREDIABETES: ICD-10-CM

## 2022-09-17 DIAGNOSIS — E78.5 HYPERLIPIDEMIA, UNSPECIFIED HYPERLIPIDEMIA TYPE: ICD-10-CM

## 2022-09-17 LAB
ALBUMIN SERPL-MCNC: 3.7 G/DL (ref 3.4–5)
ALBUMIN/GLOB SERPL: 1 {RATIO} (ref 1–2)
ALP LIVER SERPL-CCNC: 74 U/L
ALT SERPL-CCNC: 27 U/L
ANION GAP SERPL CALC-SCNC: 5 MMOL/L (ref 0–18)
AST SERPL-CCNC: 20 U/L (ref 15–37)
BASOPHILS # BLD AUTO: 0.05 X10(3) UL (ref 0–0.2)
BASOPHILS NFR BLD AUTO: 1 %
BILIRUB SERPL-MCNC: 0.6 MG/DL (ref 0.1–2)
BUN BLD-MCNC: 15 MG/DL (ref 7–18)
CALCIUM BLD-MCNC: 8.7 MG/DL (ref 8.5–10.1)
CHLORIDE SERPL-SCNC: 106 MMOL/L (ref 98–112)
CHOLEST SERPL-MCNC: 208 MG/DL (ref ?–200)
CO2 SERPL-SCNC: 29 MMOL/L (ref 21–32)
CREAT BLD-MCNC: 0.8 MG/DL
EOSINOPHIL # BLD AUTO: 0.18 X10(3) UL (ref 0–0.7)
EOSINOPHIL NFR BLD AUTO: 3.6 %
ERYTHROCYTE [DISTWIDTH] IN BLOOD BY AUTOMATED COUNT: 13.9 %
EST. AVERAGE GLUCOSE BLD GHB EST-MCNC: 126 MG/DL (ref 68–126)
FASTING PATIENT LIPID ANSWER: YES
FASTING STATUS PATIENT QL REPORTED: YES
GFR SERPLBLD BASED ON 1.73 SQ M-ARVRAT: 86 ML/MIN/1.73M2 (ref 60–?)
GLOBULIN PLAS-MCNC: 3.7 G/DL (ref 2.8–4.4)
GLUCOSE BLD-MCNC: 83 MG/DL (ref 70–99)
HBA1C MFR BLD: 6 % (ref ?–5.7)
HCT VFR BLD AUTO: 40.2 %
HDLC SERPL-MCNC: 57 MG/DL (ref 40–59)
HGB BLD-MCNC: 13.2 G/DL
IMM GRANULOCYTES # BLD AUTO: 0.01 X10(3) UL (ref 0–1)
IMM GRANULOCYTES NFR BLD: 0.2 %
LDLC SERPL CALC-MCNC: 133 MG/DL (ref ?–100)
LYMPHOCYTES # BLD AUTO: 2.04 X10(3) UL (ref 1–4)
LYMPHOCYTES NFR BLD AUTO: 40.6 %
MCH RBC QN AUTO: 28.8 PG (ref 26–34)
MCHC RBC AUTO-ENTMCNC: 32.8 G/DL (ref 31–37)
MCV RBC AUTO: 87.8 FL
MONOCYTES # BLD AUTO: 0.39 X10(3) UL (ref 0.1–1)
MONOCYTES NFR BLD AUTO: 7.8 %
NEUTROPHILS # BLD AUTO: 2.36 X10 (3) UL (ref 1.5–7.7)
NEUTROPHILS # BLD AUTO: 2.36 X10(3) UL (ref 1.5–7.7)
NEUTROPHILS NFR BLD AUTO: 46.8 %
NONHDLC SERPL-MCNC: 151 MG/DL (ref ?–130)
OSMOLALITY SERPL CALC.SUM OF ELEC: 290 MOSM/KG (ref 275–295)
PLATELET # BLD AUTO: 274 10(3)UL (ref 150–450)
POTASSIUM SERPL-SCNC: 4 MMOL/L (ref 3.5–5.1)
PROT SERPL-MCNC: 7.4 G/DL (ref 6.4–8.2)
RBC # BLD AUTO: 4.58 X10(6)UL
SODIUM SERPL-SCNC: 140 MMOL/L (ref 136–145)
TRIGL SERPL-MCNC: 101 MG/DL (ref 30–149)
TSI SER-ACNC: 0.6 MIU/ML (ref 0.36–3.74)
VLDLC SERPL CALC-MCNC: 18 MG/DL (ref 0–30)
WBC # BLD AUTO: 5 X10(3) UL (ref 4–11)

## 2022-09-17 PROCEDURE — 80061 LIPID PANEL: CPT

## 2022-09-17 PROCEDURE — 85025 COMPLETE CBC W/AUTO DIFF WBC: CPT

## 2022-09-17 PROCEDURE — 83036 HEMOGLOBIN GLYCOSYLATED A1C: CPT

## 2022-09-17 PROCEDURE — 36415 COLL VENOUS BLD VENIPUNCTURE: CPT

## 2022-09-17 PROCEDURE — 80053 COMPREHEN METABOLIC PANEL: CPT

## 2022-09-17 PROCEDURE — 84443 ASSAY THYROID STIM HORMONE: CPT

## 2022-10-18 ENCOUNTER — HOSPITAL ENCOUNTER (OUTPATIENT)
Dept: GENERAL RADIOLOGY | Age: 57
Discharge: HOME | End: 2022-10-18
Attending: FAMILY MEDICINE
Payer: COMMERCIAL

## 2022-10-18 DIAGNOSIS — G89.29 CHRONIC BILATERAL THORACIC BACK PAIN: ICD-10-CM

## 2022-10-18 DIAGNOSIS — J45.20 MILD INTERMITTENT ASTHMA WITHOUT COMPLICATION: ICD-10-CM

## 2022-10-18 DIAGNOSIS — M54.6 CHRONIC BILATERAL THORACIC BACK PAIN: ICD-10-CM

## 2022-10-18 DIAGNOSIS — G89.29 CHRONIC BILATERAL LOW BACK PAIN WITH LEFT-SIDED SCIATICA: ICD-10-CM

## 2022-10-18 DIAGNOSIS — M54.42 CHRONIC BILATERAL LOW BACK PAIN WITH LEFT-SIDED SCIATICA: ICD-10-CM

## 2022-10-18 PROCEDURE — 72110 X-RAY EXAM L-2 SPINE 4/>VWS: CPT | Performed by: FAMILY MEDICINE

## 2022-10-18 PROCEDURE — 71046 X-RAY EXAM CHEST 2 VIEWS: CPT | Performed by: FAMILY MEDICINE

## 2022-11-03 ENCOUNTER — LAB ENCOUNTER (OUTPATIENT)
Dept: LAB | Age: 57
End: 2022-11-03
Attending: PHYSICIAN ASSISTANT
Payer: COMMERCIAL

## 2022-11-03 ENCOUNTER — OFFICE VISIT (OUTPATIENT)
Dept: FAMILY MEDICINE CLINIC | Facility: CLINIC | Age: 57
End: 2022-11-03
Payer: COMMERCIAL

## 2022-11-03 VITALS
BODY MASS INDEX: 26.71 KG/M2 | HEART RATE: 74 BPM | OXYGEN SATURATION: 97 % | DIASTOLIC BLOOD PRESSURE: 72 MMHG | SYSTOLIC BLOOD PRESSURE: 118 MMHG | WEIGHT: 166.19 LBS | RESPIRATION RATE: 16 BRPM | HEIGHT: 66 IN

## 2022-11-03 DIAGNOSIS — Z00.00 ROUTINE GENERAL MEDICAL EXAMINATION AT A HEALTH CARE FACILITY: ICD-10-CM

## 2022-11-03 DIAGNOSIS — Z13.820 ENCOUNTER FOR OSTEOPOROSIS SCREENING IN ASYMPTOMATIC POSTMENOPAUSAL PATIENT: ICD-10-CM

## 2022-11-03 DIAGNOSIS — E55.9 VITAMIN D DEFICIENCY: ICD-10-CM

## 2022-11-03 DIAGNOSIS — Z12.4 CERVICAL CANCER SCREENING: ICD-10-CM

## 2022-11-03 DIAGNOSIS — R73.03 PRE-DIABETES: ICD-10-CM

## 2022-11-03 DIAGNOSIS — E89.0 POSTOPERATIVE HYPOTHYROIDISM: ICD-10-CM

## 2022-11-03 DIAGNOSIS — E61.2 MAGNESIUM DEFICIENCY: ICD-10-CM

## 2022-11-03 DIAGNOSIS — Z00.00 ROUTINE GENERAL MEDICAL EXAMINATION AT A HEALTH CARE FACILITY: Primary | ICD-10-CM

## 2022-11-03 DIAGNOSIS — Z78.0 ENCOUNTER FOR OSTEOPOROSIS SCREENING IN ASYMPTOMATIC POSTMENOPAUSAL PATIENT: ICD-10-CM

## 2022-11-03 DIAGNOSIS — Z12.31 VISIT FOR SCREENING MAMMOGRAM: ICD-10-CM

## 2022-11-03 LAB
MAGNESIUM SERPL-MCNC: 2.3 MG/DL (ref 1.6–2.6)
VIT B12 SERPL-MCNC: 709 PG/ML (ref 193–986)
VIT D+METAB SERPL-MCNC: 42.9 NG/ML (ref 30–100)

## 2022-11-03 PROCEDURE — 90686 IIV4 VACC NO PRSV 0.5 ML IM: CPT | Performed by: PHYSICIAN ASSISTANT

## 2022-11-03 PROCEDURE — 88175 CYTOPATH C/V AUTO FLUID REDO: CPT | Performed by: PHYSICIAN ASSISTANT

## 2022-11-03 PROCEDURE — 87624 HPV HI-RISK TYP POOLED RSLT: CPT | Performed by: PHYSICIAN ASSISTANT

## 2022-11-03 PROCEDURE — 83735 ASSAY OF MAGNESIUM: CPT | Performed by: PHYSICIAN ASSISTANT

## 2022-11-03 PROCEDURE — 82306 VITAMIN D 25 HYDROXY: CPT | Performed by: PHYSICIAN ASSISTANT

## 2022-11-03 PROCEDURE — 3074F SYST BP LT 130 MM HG: CPT | Performed by: PHYSICIAN ASSISTANT

## 2022-11-03 PROCEDURE — 3008F BODY MASS INDEX DOCD: CPT | Performed by: PHYSICIAN ASSISTANT

## 2022-11-03 PROCEDURE — 90471 IMMUNIZATION ADMIN: CPT | Performed by: PHYSICIAN ASSISTANT

## 2022-11-03 PROCEDURE — 99396 PREV VISIT EST AGE 40-64: CPT | Performed by: PHYSICIAN ASSISTANT

## 2022-11-03 PROCEDURE — 3078F DIAST BP <80 MM HG: CPT | Performed by: PHYSICIAN ASSISTANT

## 2022-11-03 PROCEDURE — 82607 VITAMIN B-12: CPT | Performed by: PHYSICIAN ASSISTANT

## 2022-11-04 LAB — HPV I/H RISK 1 DNA SPEC QL NAA+PROBE: NEGATIVE

## 2022-11-30 ENCOUNTER — HOSPITAL ENCOUNTER (OUTPATIENT)
Dept: MAMMOGRAPHY | Age: 57
Discharge: HOME | End: 2022-11-30
Attending: PHYSICIAN ASSISTANT
Payer: COMMERCIAL

## 2022-11-30 ENCOUNTER — HOSPITAL ENCOUNTER (OUTPATIENT)
Dept: BONE DENSITY | Age: 57
Discharge: HOME | End: 2022-11-30
Attending: PHYSICIAN ASSISTANT
Payer: COMMERCIAL

## 2022-11-30 DIAGNOSIS — Z78.0 ENCOUNTER FOR OSTEOPOROSIS SCREENING IN ASYMPTOMATIC POSTMENOPAUSAL PATIENT: ICD-10-CM

## 2022-11-30 DIAGNOSIS — Z13.820 ENCOUNTER FOR OSTEOPOROSIS SCREENING IN ASYMPTOMATIC POSTMENOPAUSAL PATIENT: ICD-10-CM

## 2022-11-30 DIAGNOSIS — Z12.31 VISIT FOR SCREENING MAMMOGRAM: ICD-10-CM

## 2022-11-30 PROCEDURE — 77067 SCR MAMMO BI INCL CAD: CPT | Performed by: PHYSICIAN ASSISTANT

## 2022-11-30 PROCEDURE — 77063 BREAST TOMOSYNTHESIS BI: CPT | Performed by: PHYSICIAN ASSISTANT

## 2022-11-30 PROCEDURE — 77080 DXA BONE DENSITY AXIAL: CPT | Performed by: PHYSICIAN ASSISTANT

## 2022-12-13 ENCOUNTER — TELEPHONE (OUTPATIENT)
Dept: FAMILY MEDICINE CLINIC | Facility: CLINIC | Age: 57
End: 2022-12-13

## 2022-12-14 ENCOUNTER — OFFICE VISIT (OUTPATIENT)
Dept: FAMILY MEDICINE CLINIC | Facility: CLINIC | Age: 57
End: 2022-12-14
Payer: COMMERCIAL

## 2022-12-14 ENCOUNTER — LAB ENCOUNTER (OUTPATIENT)
Dept: LAB | Age: 57
End: 2022-12-14
Attending: FAMILY MEDICINE
Payer: COMMERCIAL

## 2022-12-14 ENCOUNTER — HOSPITAL ENCOUNTER (OUTPATIENT)
Dept: GENERAL RADIOLOGY | Age: 57
Discharge: HOME | End: 2022-12-14
Attending: FAMILY MEDICINE
Payer: COMMERCIAL

## 2022-12-14 VITALS
RESPIRATION RATE: 16 BRPM | DIASTOLIC BLOOD PRESSURE: 62 MMHG | HEART RATE: 91 BPM | SYSTOLIC BLOOD PRESSURE: 100 MMHG | WEIGHT: 165 LBS | BODY MASS INDEX: 26.52 KG/M2 | HEIGHT: 66 IN | OXYGEN SATURATION: 98 %

## 2022-12-14 DIAGNOSIS — R10.13 EPIGASTRIC PAIN: ICD-10-CM

## 2022-12-14 DIAGNOSIS — K30 STOMACH BURNING: Primary | ICD-10-CM

## 2022-12-14 DIAGNOSIS — R30.0 DYSURIA: ICD-10-CM

## 2022-12-14 DIAGNOSIS — K59.09 CHRONIC CONSTIPATION: ICD-10-CM

## 2022-12-14 LAB
ALBUMIN SERPL-MCNC: 3.6 G/DL (ref 3.4–5)
ALBUMIN/GLOB SERPL: 1 {RATIO} (ref 1–2)
ALP LIVER SERPL-CCNC: 77 U/L
ALT SERPL-CCNC: 23 U/L
ANION GAP SERPL CALC-SCNC: 4 MMOL/L (ref 0–18)
AST SERPL-CCNC: 17 U/L (ref 15–37)
BASOPHILS # BLD AUTO: 0.04 X10(3) UL (ref 0–0.2)
BASOPHILS NFR BLD AUTO: 0.7 %
BILIRUB SERPL-MCNC: 0.4 MG/DL (ref 0.1–2)
BILIRUBIN: NEGATIVE
BUN BLD-MCNC: 15 MG/DL (ref 7–18)
CALCIUM BLD-MCNC: 9.3 MG/DL (ref 8.5–10.1)
CHLORIDE SERPL-SCNC: 105 MMOL/L (ref 98–112)
CO2 SERPL-SCNC: 30 MMOL/L (ref 21–32)
CREAT BLD-MCNC: 0.72 MG/DL
EOSINOPHIL # BLD AUTO: 0.14 X10(3) UL (ref 0–0.7)
EOSINOPHIL NFR BLD AUTO: 2.4 %
ERYTHROCYTE [DISTWIDTH] IN BLOOD BY AUTOMATED COUNT: 13.2 %
FASTING STATUS PATIENT QL REPORTED: YES
GFR SERPLBLD BASED ON 1.73 SQ M-ARVRAT: 97 ML/MIN/1.73M2 (ref 60–?)
GLOBULIN PLAS-MCNC: 3.7 G/DL (ref 2.8–4.4)
GLUCOSE (URINE DIPSTICK): NEGATIVE MG/DL
GLUCOSE BLD-MCNC: 110 MG/DL (ref 70–99)
HCT VFR BLD AUTO: 39.4 %
HGB BLD-MCNC: 13 G/DL
IMM GRANULOCYTES # BLD AUTO: 0.01 X10(3) UL (ref 0–1)
IMM GRANULOCYTES NFR BLD: 0.2 %
KETONES (URINE DIPSTICK): NEGATIVE MG/DL
LEUKOCYTES: NEGATIVE
LIPASE SERPL-CCNC: 121 U/L (ref 73–393)
LYMPHOCYTES # BLD AUTO: 1.94 X10(3) UL (ref 1–4)
LYMPHOCYTES NFR BLD AUTO: 33.4 %
MCH RBC QN AUTO: 29 PG (ref 26–34)
MCHC RBC AUTO-ENTMCNC: 33 G/DL (ref 31–37)
MCV RBC AUTO: 87.8 FL
MONOCYTES # BLD AUTO: 0.43 X10(3) UL (ref 0.1–1)
MONOCYTES NFR BLD AUTO: 7.4 %
MULTISTIX LOT#: ABNORMAL NUMERIC
NEUTROPHILS # BLD AUTO: 3.25 X10 (3) UL (ref 1.5–7.7)
NEUTROPHILS # BLD AUTO: 3.25 X10(3) UL (ref 1.5–7.7)
NEUTROPHILS NFR BLD AUTO: 55.9 %
NITRITE, URINE: NEGATIVE
OSMOLALITY SERPL CALC.SUM OF ELEC: 289 MOSM/KG (ref 275–295)
PH, URINE: 5 (ref 4.5–8)
PLATELET # BLD AUTO: 257 10(3)UL (ref 150–450)
POTASSIUM SERPL-SCNC: 4.5 MMOL/L (ref 3.5–5.1)
PROT SERPL-MCNC: 7.3 G/DL (ref 6.4–8.2)
PROTEIN (URINE DIPSTICK): NEGATIVE MG/DL
RBC # BLD AUTO: 4.49 X10(6)UL
SODIUM SERPL-SCNC: 139 MMOL/L (ref 136–145)
SPECIFIC GRAVITY: 1.03 (ref 1–1.03)
URINE-COLOR: YELLOW
UROBILINOGEN,SEMI-QN: 0.2 MG/DL (ref 0–1.9)
WBC # BLD AUTO: 5.8 X10(3) UL (ref 4–11)

## 2022-12-14 PROCEDURE — 85025 COMPLETE CBC W/AUTO DIFF WBC: CPT | Performed by: FAMILY MEDICINE

## 2022-12-14 PROCEDURE — 81003 URINALYSIS AUTO W/O SCOPE: CPT | Performed by: FAMILY MEDICINE

## 2022-12-14 PROCEDURE — 3074F SYST BP LT 130 MM HG: CPT | Performed by: FAMILY MEDICINE

## 2022-12-14 PROCEDURE — 80053 COMPREHEN METABOLIC PANEL: CPT | Performed by: FAMILY MEDICINE

## 2022-12-14 PROCEDURE — 86003 ALLG SPEC IGE CRUDE XTRC EA: CPT | Performed by: FAMILY MEDICINE

## 2022-12-14 PROCEDURE — 3008F BODY MASS INDEX DOCD: CPT | Performed by: FAMILY MEDICINE

## 2022-12-14 PROCEDURE — 83690 ASSAY OF LIPASE: CPT | Performed by: FAMILY MEDICINE

## 2022-12-14 PROCEDURE — 87086 URINE CULTURE/COLONY COUNT: CPT | Performed by: FAMILY MEDICINE

## 2022-12-14 PROCEDURE — 74018 RADEX ABDOMEN 1 VIEW: CPT | Performed by: FAMILY MEDICINE

## 2022-12-14 PROCEDURE — 3078F DIAST BP <80 MM HG: CPT | Performed by: FAMILY MEDICINE

## 2022-12-14 PROCEDURE — 99214 OFFICE O/P EST MOD 30 MIN: CPT | Performed by: FAMILY MEDICINE

## 2022-12-15 LAB — BEEF IGE QN: <0.1 KUA/L (ref ?–0.1)

## 2022-12-19 ENCOUNTER — TELEPHONE (OUTPATIENT)
Dept: PHYSICAL THERAPY | Facility: HOSPITAL | Age: 57
End: 2022-12-19

## 2022-12-19 ENCOUNTER — OFFICE VISIT (OUTPATIENT)
Dept: PHYSICAL THERAPY | Age: 57
End: 2022-12-19
Attending: FAMILY MEDICINE
Payer: COMMERCIAL

## 2022-12-19 DIAGNOSIS — R31.9 HEMATURIA, UNSPECIFIED TYPE: Primary | ICD-10-CM

## 2022-12-19 PROCEDURE — 97110 THERAPEUTIC EXERCISES: CPT

## 2022-12-19 PROCEDURE — 97162 PT EVAL MOD COMPLEX 30 MIN: CPT

## 2022-12-21 ENCOUNTER — OFFICE VISIT (OUTPATIENT)
Dept: PHYSICAL THERAPY | Age: 57
End: 2022-12-21
Attending: FAMILY MEDICINE
Payer: COMMERCIAL

## 2022-12-21 PROCEDURE — 97112 NEUROMUSCULAR REEDUCATION: CPT

## 2022-12-21 PROCEDURE — 97140 MANUAL THERAPY 1/> REGIONS: CPT

## 2022-12-21 PROCEDURE — 97110 THERAPEUTIC EXERCISES: CPT

## 2023-01-10 ENCOUNTER — APPOINTMENT (OUTPATIENT)
Dept: PHYSICAL THERAPY | Age: 58
End: 2023-01-10
Attending: FAMILY MEDICINE
Payer: COMMERCIAL

## 2023-01-11 ENCOUNTER — TELEPHONE (OUTPATIENT)
Dept: PHYSICAL THERAPY | Facility: HOSPITAL | Age: 58
End: 2023-01-11

## 2023-01-12 ENCOUNTER — APPOINTMENT (OUTPATIENT)
Dept: PHYSICAL THERAPY | Age: 58
End: 2023-01-12
Attending: FAMILY MEDICINE
Payer: COMMERCIAL

## 2023-01-16 ENCOUNTER — APPOINTMENT (OUTPATIENT)
Dept: PHYSICAL THERAPY | Age: 58
End: 2023-01-16
Attending: FAMILY MEDICINE
Payer: COMMERCIAL

## 2023-01-18 ENCOUNTER — OFFICE VISIT (OUTPATIENT)
Dept: PHYSICAL THERAPY | Age: 58
End: 2023-01-18
Attending: FAMILY MEDICINE
Payer: COMMERCIAL

## 2023-01-18 PROCEDURE — 97112 NEUROMUSCULAR REEDUCATION: CPT

## 2023-01-18 PROCEDURE — 97014 ELECTRIC STIMULATION THERAPY: CPT

## 2023-01-18 PROCEDURE — 97110 THERAPEUTIC EXERCISES: CPT

## 2023-01-18 PROCEDURE — 97140 MANUAL THERAPY 1/> REGIONS: CPT

## 2023-01-23 ENCOUNTER — TELEPHONE (OUTPATIENT)
Dept: PHYSICAL THERAPY | Facility: HOSPITAL | Age: 58
End: 2023-01-23

## 2023-01-23 ENCOUNTER — APPOINTMENT (OUTPATIENT)
Dept: PHYSICAL THERAPY | Age: 58
End: 2023-01-23
Attending: FAMILY MEDICINE
Payer: COMMERCIAL

## 2023-01-25 ENCOUNTER — OFFICE VISIT (OUTPATIENT)
Dept: PHYSICAL THERAPY | Age: 58
End: 2023-01-25
Attending: FAMILY MEDICINE
Payer: COMMERCIAL

## 2023-01-25 PROCEDURE — 97110 THERAPEUTIC EXERCISES: CPT

## 2023-01-25 PROCEDURE — 97112 NEUROMUSCULAR REEDUCATION: CPT

## 2023-01-26 ENCOUNTER — HOSPITAL ENCOUNTER (OUTPATIENT)
Dept: ULTRASOUND IMAGING | Age: 58
Discharge: HOME | End: 2023-01-26
Attending: PHYSICIAN ASSISTANT
Payer: COMMERCIAL

## 2023-01-26 VITALS — HEIGHT: 66 IN | WEIGHT: 165 LBS | BODY MASS INDEX: 26.52 KG/M2

## 2023-01-26 DIAGNOSIS — Z00.00 ROUTINE GENERAL MEDICAL EXAMINATION AT A HEALTH CARE FACILITY: ICD-10-CM

## 2023-01-30 ENCOUNTER — APPOINTMENT (OUTPATIENT)
Dept: PHYSICAL THERAPY | Age: 58
End: 2023-01-30
Attending: FAMILY MEDICINE
Payer: COMMERCIAL

## 2023-01-30 ENCOUNTER — OFFICE VISIT (OUTPATIENT)
Dept: ENDOCRINOLOGY | Age: 58
End: 2023-01-30

## 2023-01-30 ENCOUNTER — LAB SERVICES (OUTPATIENT)
Dept: ENDOCRINOLOGY | Age: 58
End: 2023-01-30

## 2023-01-30 VITALS
HEART RATE: 74 BPM | SYSTOLIC BLOOD PRESSURE: 103 MMHG | WEIGHT: 167.11 LBS | DIASTOLIC BLOOD PRESSURE: 67 MMHG | TEMPERATURE: 98.2 F

## 2023-01-30 DIAGNOSIS — R63.5 ABNORMAL WEIGHT GAIN: ICD-10-CM

## 2023-01-30 DIAGNOSIS — E89.0 POST-SURGICAL HYPOTHYROIDISM: ICD-10-CM

## 2023-01-30 DIAGNOSIS — E55.9 VITAMIN D DEFICIENCY, UNSPECIFIED: ICD-10-CM

## 2023-01-30 DIAGNOSIS — R73.03 PREDIABETES: ICD-10-CM

## 2023-01-30 DIAGNOSIS — C73 PAPILLARY CARCINOMA OF THYROID (CMD): Primary | ICD-10-CM

## 2023-01-30 DIAGNOSIS — C73 PAPILLARY CARCINOMA OF THYROID (CMD): ICD-10-CM

## 2023-01-30 DIAGNOSIS — E06.3 CHRONIC LYMPHOCYTIC THYROIDITIS: ICD-10-CM

## 2023-01-30 LAB
T4 FREE SERPL-MCNC: 1.4 NG/DL (ref 0.8–1.5)
TSH SERPL-ACNC: 0.07 MCUNITS/ML (ref 0.35–5)

## 2023-01-30 PROCEDURE — 99214 OFFICE O/P EST MOD 30 MIN: CPT | Performed by: INTERNAL MEDICINE

## 2023-01-30 PROCEDURE — 36415 COLL VENOUS BLD VENIPUNCTURE: CPT | Performed by: INTERNAL MEDICINE

## 2023-01-30 PROCEDURE — 83036 HEMOGLOBIN GLYCOSYLATED A1C: CPT | Performed by: INTERNAL MEDICINE

## 2023-01-30 PROCEDURE — 84443 ASSAY THYROID STIM HORMONE: CPT | Performed by: INTERNAL MEDICINE

## 2023-01-30 PROCEDURE — 84432 ASSAY OF THYROGLOBULIN: CPT | Performed by: INTERNAL MEDICINE

## 2023-01-30 PROCEDURE — 84439 ASSAY OF FREE THYROXINE: CPT | Performed by: INTERNAL MEDICINE

## 2023-01-31 LAB — HBA1C MFR BLD: 5.5 % (ref 4.5–5.6)

## 2023-02-01 ENCOUNTER — TELEPHONE (OUTPATIENT)
Dept: ENDOCRINOLOGY | Age: 58
End: 2023-02-01

## 2023-02-01 ENCOUNTER — APPOINTMENT (OUTPATIENT)
Dept: PHYSICAL THERAPY | Age: 58
End: 2023-02-01
Attending: FAMILY MEDICINE
Payer: COMMERCIAL

## 2023-02-01 LAB
THYROGLOB AB SERPL-ACNC: <0.9 IU/ML (ref 0–4)
THYROGLOB SERPL-MCNC: 0.1 NG/ML (ref 1.2–35)

## 2023-02-01 RX ORDER — LEVOTHYROXINE SODIUM 88 UG/1
88 TABLET ORAL DAILY
Qty: 90 TABLET | Refills: 3 | Status: SHIPPED | OUTPATIENT
Start: 2023-02-01 | End: 2023-04-11 | Stop reason: SDUPTHER

## 2023-02-28 ENCOUNTER — TELEMEDICINE (OUTPATIENT)
Dept: FAMILY MEDICINE CLINIC | Facility: CLINIC | Age: 58
End: 2023-02-28
Payer: COMMERCIAL

## 2023-02-28 DIAGNOSIS — G89.29 CHRONIC RADICULAR LUMBAR PAIN: Primary | ICD-10-CM

## 2023-02-28 DIAGNOSIS — M54.16 CHRONIC RADICULAR LUMBAR PAIN: Primary | ICD-10-CM

## 2023-02-28 PROCEDURE — 99214 OFFICE O/P EST MOD 30 MIN: CPT | Performed by: FAMILY MEDICINE

## 2023-02-28 RX ORDER — PREDNISONE 20 MG/1
40 TABLET ORAL DAILY
Qty: 10 TABLET | Refills: 0 | Status: SHIPPED | OUTPATIENT
Start: 2023-02-28 | End: 2023-03-05

## 2023-02-28 RX ORDER — DIAZEPAM 2 MG/1
TABLET ORAL
Qty: 4 TABLET | Refills: 0 | Status: SHIPPED | OUTPATIENT
Start: 2023-02-28

## 2023-03-20 PROBLEM — E06.3 CHRONIC LYMPHOCYTIC THYROIDITIS: Status: ACTIVE | Noted: 2023-01-30

## 2023-03-20 PROBLEM — R63.5 ABNORMAL WEIGHT GAIN: Status: ACTIVE | Noted: 2023-01-30

## 2023-04-09 DIAGNOSIS — E03.9 HYPOTHYROIDISM, UNSPECIFIED TYPE: ICD-10-CM

## 2023-04-10 RX ORDER — LEVOTHYROXINE SODIUM 0.1 MG/1
TABLET ORAL
Qty: 90 TABLET | Refills: 1 | OUTPATIENT
Start: 2023-04-10

## 2023-04-11 ENCOUNTER — TELEPHONE (OUTPATIENT)
Dept: PEDIATRIC ENDOCRINOLOGY | Age: 58
End: 2023-04-11

## 2023-04-11 DIAGNOSIS — E03.9 HYPOTHYROIDISM, UNSPECIFIED TYPE: Primary | ICD-10-CM

## 2023-04-11 RX ORDER — LEVOTHYROXINE SODIUM 88 UG/1
88 TABLET ORAL DAILY
Qty: 90 TABLET | Refills: 2 | Status: SHIPPED | OUTPATIENT
Start: 2023-04-11

## 2023-05-23 NOTE — LETTER
[facility] [place] [id number] [place] [address] [place] [address]  Phone: [phone]    Meir Ramesh was seen and treated in our emergency department on 5/23/2023.  He may return to school on 05/24/2023.          Thank you for choosing [facility].    Oleksandr Carreno MD Patient Name: Tia Dee  YOB: 1965          MRN number:  [id number]  Date:  10/12/2017  Referring Physician:  Edith Linn          SPINE EVALUATION:    Referring Physician: Dr. Deana Quintanilla  Diagnosis: Plantar fasciitis (M72.2)  Low back pain wi Precautions:  Latex Allergy, Drug Allergy  OBJECTIVE:   Observation/Posture: increased lumbar lordosis, medial longitudinal arch WNL bilat  Gait: decreased trunk rotation, no significant antalgia noted  Neuro Screen: LE Dermatomes: WNL; LE myotomes: 5/5 · Pt will have decreased paraspinal mm tension for ability to tolerate sitting for >30 minutes comfortably  · Pt will be independent and compliant with comprehensive HEP to maintain progress achieved in PT    Frequency / Duration: Patient will be seen for

## 2023-06-27 PROBLEM — R12 HEARTBURN: Status: RESOLVED | Noted: 2019-05-29 | Resolved: 2023-06-27

## 2023-06-27 PROBLEM — R12 CHRONIC HEARTBURN: Status: RESOLVED | Noted: 2019-05-29 | Resolved: 2023-06-27

## 2023-06-27 PROBLEM — K31.7 GASTRIC POLYP: Status: ACTIVE | Noted: 2023-06-27

## 2023-06-27 PROBLEM — R19.4 CHANGE IN BOWEL HABITS: Status: ACTIVE | Noted: 2023-06-27

## 2023-06-27 PROBLEM — R12 CHRONIC HEARTBURN: Status: ACTIVE | Noted: 2023-06-27

## 2023-06-27 PROBLEM — K59.00 CONSTIPATION, UNSPECIFIED: Status: ACTIVE | Noted: 2023-06-27

## 2023-06-27 PROBLEM — D12.5 BENIGN NEOPLASM OF SIGMOID COLON: Status: ACTIVE | Noted: 2023-06-27

## 2023-06-27 PROBLEM — R10.13 ABDOMINAL PAIN, EPIGASTRIC: Status: ACTIVE | Noted: 2023-06-27

## 2023-06-27 PROBLEM — D12.0 BENIGN NEOPLASM OF CECUM: Status: ACTIVE | Noted: 2023-06-27

## 2023-06-27 PROBLEM — Z86.010 HISTORY OF ADENOMATOUS POLYP OF COLON: Status: ACTIVE | Noted: 2023-06-27

## 2023-06-27 PROBLEM — R14.1 ABDOMINAL GAS PAIN: Status: ACTIVE | Noted: 2023-06-27

## 2023-06-27 PROBLEM — Z86.0101 HISTORY OF ADENOMATOUS POLYP OF COLON: Status: ACTIVE | Noted: 2023-06-27

## 2023-06-27 PROBLEM — R05.8 RECURRENT COUGH: Status: ACTIVE | Noted: 2023-06-27

## 2023-06-27 PROBLEM — K63.5 POLYP OF COLON: Status: ACTIVE | Noted: 2023-06-27

## 2023-06-27 PROCEDURE — 88305 TISSUE EXAM BY PATHOLOGIST: CPT | Performed by: INTERNAL MEDICINE

## 2023-07-13 ENCOUNTER — HOSPITAL ENCOUNTER (OUTPATIENT)
Dept: GENERAL RADIOLOGY | Age: 58
Discharge: HOME | End: 2023-07-13
Attending: PHYSICIAN ASSISTANT
Payer: COMMERCIAL

## 2023-07-13 ENCOUNTER — LAB ENCOUNTER (OUTPATIENT)
Dept: LAB | Age: 58
End: 2023-07-13
Attending: PHYSICIAN ASSISTANT
Payer: COMMERCIAL

## 2023-07-13 ENCOUNTER — OFFICE VISIT (OUTPATIENT)
Dept: FAMILY MEDICINE CLINIC | Facility: CLINIC | Age: 58
End: 2023-07-13
Payer: COMMERCIAL

## 2023-07-13 VITALS
OXYGEN SATURATION: 97 % | WEIGHT: 168 LBS | SYSTOLIC BLOOD PRESSURE: 108 MMHG | RESPIRATION RATE: 18 BRPM | BODY MASS INDEX: 27 KG/M2 | HEIGHT: 66 IN | DIASTOLIC BLOOD PRESSURE: 72 MMHG | HEART RATE: 85 BPM

## 2023-07-13 DIAGNOSIS — G89.29 CHRONIC RADICULAR LUMBAR PAIN: ICD-10-CM

## 2023-07-13 DIAGNOSIS — E89.0 POSTOPERATIVE HYPOTHYROIDISM: ICD-10-CM

## 2023-07-13 DIAGNOSIS — R30.0 BURNING WITH URINATION: ICD-10-CM

## 2023-07-13 DIAGNOSIS — M94.0 COSTOCHONDRITIS: ICD-10-CM

## 2023-07-13 DIAGNOSIS — M54.16 CHRONIC RADICULAR LUMBAR PAIN: ICD-10-CM

## 2023-07-13 DIAGNOSIS — R30.0 BURNING WITH URINATION: Primary | ICD-10-CM

## 2023-07-13 LAB
APPEARANCE: CLEAR
BILIRUBIN: NEGATIVE
GLUCOSE (URINE DIPSTICK): NEGATIVE MG/DL
KETONES (URINE DIPSTICK): NEGATIVE MG/DL
LEUKOCYTES: NEGATIVE
MULTISTIX LOT#: ABNORMAL NUMERIC
NITRITE, URINE: NEGATIVE
PH, URINE: 5 (ref 4.5–8)
PROTEIN (URINE DIPSTICK): NEGATIVE MG/DL
SPECIFIC GRAVITY: 1.03 (ref 1–1.03)
TSI SER-ACNC: 0.36 MIU/ML (ref 0.36–3.74)
URINE-COLOR: YELLOW
UROBILINOGEN,SEMI-QN: 0.2 MG/DL (ref 0–1.9)

## 2023-07-13 PROCEDURE — 74018 RADEX ABDOMEN 1 VIEW: CPT | Performed by: PHYSICIAN ASSISTANT

## 2023-07-13 PROCEDURE — 84443 ASSAY THYROID STIM HORMONE: CPT | Performed by: PHYSICIAN ASSISTANT

## 2023-07-13 PROCEDURE — 3074F SYST BP LT 130 MM HG: CPT | Performed by: PHYSICIAN ASSISTANT

## 2023-07-13 PROCEDURE — 87086 URINE CULTURE/COLONY COUNT: CPT | Performed by: PHYSICIAN ASSISTANT

## 2023-07-13 PROCEDURE — 3078F DIAST BP <80 MM HG: CPT | Performed by: PHYSICIAN ASSISTANT

## 2023-07-13 PROCEDURE — 81003 URINALYSIS AUTO W/O SCOPE: CPT | Performed by: PHYSICIAN ASSISTANT

## 2023-07-13 PROCEDURE — 99214 OFFICE O/P EST MOD 30 MIN: CPT | Performed by: PHYSICIAN ASSISTANT

## 2023-07-13 PROCEDURE — 3008F BODY MASS INDEX DOCD: CPT | Performed by: PHYSICIAN ASSISTANT

## 2023-07-13 RX ORDER — METHYLPREDNISOLONE 4 MG/1
TABLET ORAL
Qty: 21 TABLET | Refills: 0 | Status: SHIPPED | OUTPATIENT
Start: 2023-07-13

## 2023-07-13 NOTE — PROGRESS NOTES
Subjective:   Patient ID: Caroline Donaldson is a 61year old female. HPI  Patient presents to discuss a few concerns:    Having dysuria/burning x 1 month  Pressure in her back  Getting worse over time  Initially thought it was her arthritis  Darker colored urine  Going more frequently than usual  Feeling tired  No fever/chills  No nausea    Also c/o chest pain x 2-3 weeks  Located left upper chest  Cardiac CT score of 0 in 12/2022  Taking PPI prn but denies heartburn/acid reflux  Some upper abdomen discomfort  Unsure if it is gas  Recent EGD showed gastritis  No radiation of pain but left arm feels heavy  No shortness of breath  Waking up at night but unsure why or if it is her breathing  Sometimes coughing  Chest pain is at rest, non-exertional  Happens a lot at bedtime  Lasts a while, sometimes waking with it  No otc meds tried      History/Other:   Review of Systems   Constitutional:  Negative for chills, fatigue and fever. HENT:  Negative for congestion, ear pain, rhinorrhea and sore throat. Eyes:  Negative for visual disturbance. Respiratory:  Negative for cough, shortness of breath and wheezing. Cardiovascular:  Positive for chest pain. Negative for palpitations and leg swelling. Gastrointestinal:  Positive for abdominal pain and constipation. Negative for diarrhea, nausea and vomiting. Genitourinary:  Positive for dysuria and frequency. Negative for hematuria. Musculoskeletal:  Negative for arthralgias, gait problem and myalgias. Skin:  Negative for rash. Neurological:  Negative for weakness, light-headedness and headaches. Hematological:  Negative for adenopathy. Psychiatric/Behavioral:  Negative for dysphoric mood. The patient is not nervous/anxious. Current Outpatient Medications   Medication Sig Dispense Refill    omeprazole 20 MG Oral Capsule Delayed Release Take 1 capsule (20 mg total) by mouth every morning.  90 capsule 3    levothyroxine 88 MCG Oral Tab Take 1 tablet (88 mcg total) by mouth daily. Cholecalciferol (VITAMIN D3) 3000 UNITS Oral Tab Take 1 capsule by mouth daily. Allergies:  Nsaids                  RASH  Penicillins             RASH  Pyrazodine [Phenazo*    RASH  Ra Ibuprofen            RASH  Salicylates                 Comment:unsure  Sulfa Antibiotics       RASH, SWELLING    Objective:   Physical Exam  Vitals and nursing note reviewed. Constitutional:       General: She is not in acute distress. Appearance: She is well-developed. HENT:      Head: Normocephalic and atraumatic. Right Ear: External ear normal.      Left Ear: External ear normal.      Nose: Nose normal.   Eyes:      Conjunctiva/sclera: Conjunctivae normal.      Pupils: Pupils are equal, round, and reactive to light. Neck:      Thyroid: No thyromegaly. Cardiovascular:      Rate and Rhythm: Normal rate and regular rhythm. Heart sounds: Normal heart sounds. Pulmonary:      Effort: Pulmonary effort is normal.      Breath sounds: Normal breath sounds. No wheezing or rales. Chest:      Chest wall: Tenderness (TTP chest wall along the sternal border bilaterally) present. Abdominal:      General: Bowel sounds are normal. There is no distension. Palpations: Abdomen is soft. There is no mass. Tenderness: There is abdominal tenderness in the suprapubic area. Musculoskeletal:         General: No tenderness. Normal range of motion. Cervical back: Normal range of motion and neck supple. Lymphadenopathy:      Cervical: No cervical adenopathy. Skin:     General: Skin is warm and dry. Findings: No rash. Neurological:      Mental Status: She is alert and oriented to person, place, and time. Psychiatric:         Behavior: Behavior normal.         Assessment & Plan:   1. Burning with urination  UA shows blood.  Send for culture, treat accordingly.   - URINALYSIS, AUTO, W/O SCOPE  - URINE CULTURE, ROUTINE; Future  - XR ABDOMEN (1 VIEW) (CPT=74018); Future  - URINE CULTURE, ROUTINE    2. Postoperative hypothyroidism  Recheck TSH and adjust dose of medication if needed. - ASSAY, THYROID STIM HORMONE; Future    3. Chronic radicular lumbar pain  Treat with medrol dose pack for recent flare up of symptoms. Follow up if not soon better or if worsens. - methylPREDNISolone (MEDROL) 4 MG Oral Tablet Therapy Pack; As directed. Dispense: 21 tablet; Refill: 0    4. Costochondritis  - methylPREDNISolone (MEDROL) 4 MG Oral Tablet Therapy Pack; As directed. Dispense: 21 tablet;  Refill: 0

## 2023-08-01 ENCOUNTER — APPOINTMENT (OUTPATIENT)
Dept: CT IMAGING | Facility: HOSPITAL | Age: 58
End: 2023-08-01
Attending: EMERGENCY MEDICINE
Payer: COMMERCIAL

## 2023-08-01 ENCOUNTER — HOSPITAL ENCOUNTER (OUTPATIENT)
Facility: HOSPITAL | Age: 58
Setting detail: OBSERVATION
Discharge: HOME | End: 2023-08-02
Attending: EMERGENCY MEDICINE | Admitting: STUDENT IN AN ORGANIZED HEALTH CARE EDUCATION/TRAINING PROGRAM
Payer: COMMERCIAL

## 2023-08-01 ENCOUNTER — APPOINTMENT (OUTPATIENT)
Dept: GENERAL RADIOLOGY | Facility: HOSPITAL | Age: 58
End: 2023-08-01
Payer: COMMERCIAL

## 2023-08-01 DIAGNOSIS — R07.9 ACUTE CHEST PAIN: Primary | ICD-10-CM

## 2023-08-01 DIAGNOSIS — R42 DIZZINESS: ICD-10-CM

## 2023-08-01 DIAGNOSIS — R55 SYNCOPE, NEAR: ICD-10-CM

## 2023-08-01 LAB
ALBUMIN SERPL-MCNC: 3.6 G/DL (ref 3.4–5)
ALBUMIN/GLOB SERPL: 1 {RATIO} (ref 1–2)
ALP LIVER SERPL-CCNC: 71 U/L
ALT SERPL-CCNC: 32 U/L
ANION GAP SERPL CALC-SCNC: 4 MMOL/L (ref 0–18)
AST SERPL-CCNC: 22 U/L (ref 15–37)
ATRIAL RATE: 60 BPM
ATRIAL RATE: 63 BPM
BASOPHILS # BLD AUTO: 0.02 X10(3) UL (ref 0–0.2)
BASOPHILS NFR BLD AUTO: 0.3 %
BILIRUB SERPL-MCNC: 0.3 MG/DL (ref 0.1–2)
BILIRUB UR QL STRIP.AUTO: NEGATIVE
BUN BLD-MCNC: 22 MG/DL (ref 7–18)
CALCIUM BLD-MCNC: 8.6 MG/DL (ref 8.5–10.1)
CHLORIDE SERPL-SCNC: 106 MMOL/L (ref 98–112)
CLARITY UR REFRACT.AUTO: CLEAR
CO2 SERPL-SCNC: 30 MMOL/L (ref 21–32)
COLOR UR AUTO: YELLOW
CREAT BLD-MCNC: 0.8 MG/DL
EGFRCR SERPLBLD CKD-EPI 2021: 85 ML/MIN/1.73M2 (ref 60–?)
EOSINOPHIL # BLD AUTO: 0.14 X10(3) UL (ref 0–0.7)
EOSINOPHIL NFR BLD AUTO: 2.2 %
ERYTHROCYTE [DISTWIDTH] IN BLOOD BY AUTOMATED COUNT: 13.7 %
GLOBULIN PLAS-MCNC: 3.6 G/DL (ref 2.8–4.4)
GLUCOSE BLD-MCNC: 123 MG/DL (ref 70–99)
GLUCOSE UR STRIP.AUTO-MCNC: NEGATIVE MG/DL
HCT VFR BLD AUTO: 36.4 %
HGB BLD-MCNC: 12.6 G/DL
IMM GRANULOCYTES # BLD AUTO: 0.03 X10(3) UL (ref 0–1)
IMM GRANULOCYTES NFR BLD: 0.5 %
KETONES UR STRIP.AUTO-MCNC: NEGATIVE MG/DL
LYMPHOCYTES # BLD AUTO: 1.93 X10(3) UL (ref 1–4)
LYMPHOCYTES NFR BLD AUTO: 29.8 %
MAGNESIUM SERPL-MCNC: 2.3 MG/DL (ref 1.6–2.6)
MCH RBC QN AUTO: 28.8 PG (ref 26–34)
MCHC RBC AUTO-ENTMCNC: 34.6 G/DL (ref 31–37)
MCV RBC AUTO: 83.3 FL
MONOCYTES # BLD AUTO: 0.4 X10(3) UL (ref 0.1–1)
MONOCYTES NFR BLD AUTO: 6.2 %
NEUTROPHILS # BLD AUTO: 3.95 X10 (3) UL (ref 1.5–7.7)
NEUTROPHILS # BLD AUTO: 3.95 X10(3) UL (ref 1.5–7.7)
NEUTROPHILS NFR BLD AUTO: 61 %
NITRITE UR QL STRIP.AUTO: NEGATIVE
OSMOLALITY SERPL CALC.SUM OF ELEC: 295 MOSM/KG (ref 275–295)
P AXIS: 53 DEGREES
P AXIS: 63 DEGREES
P-R INTERVAL: 188 MS
P-R INTERVAL: 214 MS
PH UR STRIP.AUTO: 7 [PH] (ref 5–8)
PLATELET # BLD AUTO: 226 10(3)UL (ref 150–450)
POTASSIUM SERPL-SCNC: 3.8 MMOL/L (ref 3.5–5.1)
PROT SERPL-MCNC: 7.2 G/DL (ref 6.4–8.2)
PROT UR STRIP.AUTO-MCNC: NEGATIVE MG/DL
Q-T INTERVAL: 372 MS
Q-T INTERVAL: 388 MS
QRS DURATION: 78 MS
QRS DURATION: 80 MS
QTC CALCULATION (BEZET): 380 MS
QTC CALCULATION (BEZET): 388 MS
R AXIS: 10 DEGREES
R AXIS: 19 DEGREES
RBC # BLD AUTO: 4.37 X10(6)UL
RBC UR QL AUTO: NEGATIVE
SODIUM SERPL-SCNC: 140 MMOL/L (ref 136–145)
SP GR UR STRIP.AUTO: 1.01 (ref 1–1.03)
T AXIS: 30 DEGREES
T AXIS: 37 DEGREES
TROPONIN I HIGH SENSITIVITY: 5 NG/L
TROPONIN I HIGH SENSITIVITY: 6 NG/L
TROPONIN I HIGH SENSITIVITY: 6 NG/L
TROPONIN I HIGH SENSITIVITY: 7 NG/L
UROBILINOGEN UR STRIP.AUTO-MCNC: <2 MG/DL
VENTRICULAR RATE: 60 BPM
VENTRICULAR RATE: 63 BPM
WBC # BLD AUTO: 6.5 X10(3) UL (ref 4–11)

## 2023-08-01 PROCEDURE — 99223 1ST HOSP IP/OBS HIGH 75: CPT | Performed by: STUDENT IN AN ORGANIZED HEALTH CARE EDUCATION/TRAINING PROGRAM

## 2023-08-01 PROCEDURE — 70450 CT HEAD/BRAIN W/O DYE: CPT | Performed by: EMERGENCY MEDICINE

## 2023-08-01 PROCEDURE — 71045 X-RAY EXAM CHEST 1 VIEW: CPT

## 2023-08-01 RX ORDER — ECHINACEA PURPUREA EXTRACT 125 MG
1 TABLET ORAL
Status: DISCONTINUED | OUTPATIENT
Start: 2023-08-01 | End: 2023-08-02

## 2023-08-01 RX ORDER — ACETAMINOPHEN 500 MG
1000 TABLET ORAL EVERY 8 HOURS PRN
Status: DISCONTINUED | OUTPATIENT
Start: 2023-08-01 | End: 2023-08-02

## 2023-08-01 RX ORDER — MECLIZINE HYDROCHLORIDE 25 MG/1
25 TABLET ORAL 3 TIMES DAILY PRN
Status: DISCONTINUED | OUTPATIENT
Start: 2023-08-01 | End: 2023-08-02

## 2023-08-01 RX ORDER — BENZONATATE 200 MG/1
200 CAPSULE ORAL 3 TIMES DAILY PRN
Status: DISCONTINUED | OUTPATIENT
Start: 2023-08-01 | End: 2023-08-02

## 2023-08-01 RX ORDER — SODIUM CHLORIDE, SODIUM LACTATE, POTASSIUM CHLORIDE, CALCIUM CHLORIDE 600; 310; 30; 20 MG/100ML; MG/100ML; MG/100ML; MG/100ML
INJECTION, SOLUTION INTRAVENOUS CONTINUOUS
Status: DISCONTINUED | OUTPATIENT
Start: 2023-08-01 | End: 2023-08-02

## 2023-08-01 RX ORDER — BISACODYL 10 MG
10 SUPPOSITORY, RECTAL RECTAL
Status: DISCONTINUED | OUTPATIENT
Start: 2023-08-01 | End: 2023-08-02

## 2023-08-01 RX ORDER — PROCHLORPERAZINE EDISYLATE 5 MG/ML
5 INJECTION INTRAMUSCULAR; INTRAVENOUS EVERY 8 HOURS PRN
Status: DISCONTINUED | OUTPATIENT
Start: 2023-08-01 | End: 2023-08-02

## 2023-08-01 RX ORDER — ENOXAPARIN SODIUM 100 MG/ML
40 INJECTION SUBCUTANEOUS DAILY
Status: DISCONTINUED | OUTPATIENT
Start: 2023-08-02 | End: 2023-08-02

## 2023-08-01 RX ORDER — LEVOTHYROXINE SODIUM 88 UG/1
88 TABLET ORAL
Status: DISCONTINUED | OUTPATIENT
Start: 2023-08-02 | End: 2023-08-02

## 2023-08-01 RX ORDER — SENNOSIDES 8.6 MG
17.2 TABLET ORAL NIGHTLY PRN
Status: DISCONTINUED | OUTPATIENT
Start: 2023-08-01 | End: 2023-08-02

## 2023-08-01 RX ORDER — PANTOPRAZOLE SODIUM 20 MG/1
20 TABLET, DELAYED RELEASE ORAL
Status: DISCONTINUED | OUTPATIENT
Start: 2023-08-02 | End: 2023-08-02

## 2023-08-01 RX ORDER — ONDANSETRON 2 MG/ML
4 INJECTION INTRAMUSCULAR; INTRAVENOUS EVERY 6 HOURS PRN
Status: DISCONTINUED | OUTPATIENT
Start: 2023-08-01 | End: 2023-08-02

## 2023-08-01 RX ORDER — POLYETHYLENE GLYCOL 3350 17 G/17G
17 POWDER, FOR SOLUTION ORAL DAILY PRN
Status: DISCONTINUED | OUTPATIENT
Start: 2023-08-01 | End: 2023-08-02

## 2023-08-01 RX ORDER — MELATONIN
3 NIGHTLY PRN
Status: DISCONTINUED | OUTPATIENT
Start: 2023-08-01 | End: 2023-08-02

## 2023-08-01 NOTE — ED QUICK NOTES
Orders for admission, patient is aware of plan and ready to go upstairs. Any questions, please call ED RN at extension [phone].      Patient Covid vaccination status: Fully vaccinated     COVID Test Ordered in ED: None    COVID Suspicion at Admission: N/A    Running Infusions:  None    Mental Status/LOC at time of transport: x3    Other pertinent information:   CIWA score: N/A   NIH score:  N/A

## 2023-08-01 NOTE — ED INITIAL ASSESSMENT (HPI)
Patient sent from [address] [place] [address] for eval of dizziness and a brief period of \"blacking out\" this am.

## 2023-08-02 ENCOUNTER — APPOINTMENT (OUTPATIENT)
Dept: CV DIAGNOSTICS | Facility: HOSPITAL | Age: 58
End: 2023-08-02
Attending: STUDENT IN AN ORGANIZED HEALTH CARE EDUCATION/TRAINING PROGRAM
Payer: COMMERCIAL

## 2023-08-02 VITALS
SYSTOLIC BLOOD PRESSURE: 94 MMHG | BODY MASS INDEX: 27.28 KG/M2 | DIASTOLIC BLOOD PRESSURE: 68 MMHG | WEIGHT: 169.75 LBS | RESPIRATION RATE: 21 BRPM | HEART RATE: 75 BPM | HEIGHT: 66 IN | OXYGEN SATURATION: 97 % | TEMPERATURE: 98 F

## 2023-08-02 LAB
ALBUMIN SERPL-MCNC: 3.4 G/DL (ref 3.4–5)
ALBUMIN/GLOB SERPL: 0.9 {RATIO} (ref 1–2)
ALP LIVER SERPL-CCNC: 69 U/L
ALT SERPL-CCNC: 32 U/L
ANION GAP SERPL CALC-SCNC: 6 MMOL/L (ref 0–18)
AST SERPL-CCNC: 21 U/L (ref 15–37)
BASOPHILS # BLD AUTO: 0.04 X10(3) UL (ref 0–0.2)
BASOPHILS NFR BLD AUTO: 0.6 %
BILIRUB SERPL-MCNC: 0.4 MG/DL (ref 0.1–2)
BUN BLD-MCNC: 21 MG/DL (ref 7–18)
CALCIUM BLD-MCNC: 8.7 MG/DL (ref 8.5–10.1)
CHLORIDE SERPL-SCNC: 106 MMOL/L (ref 98–112)
CHOLEST SERPL-MCNC: 222 MG/DL (ref ?–200)
CO2 SERPL-SCNC: 26 MMOL/L (ref 21–32)
CREAT BLD-MCNC: 0.74 MG/DL
EGFRCR SERPLBLD CKD-EPI 2021: 94 ML/MIN/1.73M2 (ref 60–?)
EOSINOPHIL # BLD AUTO: 0.16 X10(3) UL (ref 0–0.7)
EOSINOPHIL NFR BLD AUTO: 2.2 %
ERYTHROCYTE [DISTWIDTH] IN BLOOD BY AUTOMATED COUNT: 14.2 %
GLOBULIN PLAS-MCNC: 3.9 G/DL (ref 2.8–4.4)
GLUCOSE BLD-MCNC: 109 MG/DL (ref 70–99)
HCT VFR BLD AUTO: 39.4 %
HDLC SERPL-MCNC: 67 MG/DL (ref 40–59)
HGB BLD-MCNC: 13 G/DL
IMM GRANULOCYTES # BLD AUTO: 0.02 X10(3) UL (ref 0–1)
IMM GRANULOCYTES NFR BLD: 0.3 %
INR BLD: 1.02 (ref 0.85–1.16)
LDLC SERPL CALC-MCNC: 137 MG/DL (ref ?–100)
LYMPHOCYTES # BLD AUTO: 2.32 X10(3) UL (ref 1–4)
LYMPHOCYTES NFR BLD AUTO: 32.6 %
MAGNESIUM SERPL-MCNC: 2.1 MG/DL (ref 1.6–2.6)
MCH RBC QN AUTO: 28.8 PG (ref 26–34)
MCHC RBC AUTO-ENTMCNC: 33 G/DL (ref 31–37)
MCV RBC AUTO: 87.4 FL
MONOCYTES # BLD AUTO: 0.45 X10(3) UL (ref 0.1–1)
MONOCYTES NFR BLD AUTO: 6.3 %
NEUTROPHILS # BLD AUTO: 4.13 X10 (3) UL (ref 1.5–7.7)
NEUTROPHILS # BLD AUTO: 4.13 X10(3) UL (ref 1.5–7.7)
NEUTROPHILS NFR BLD AUTO: 58 %
NONHDLC SERPL-MCNC: 155 MG/DL (ref ?–130)
OSMOLALITY SERPL CALC.SUM OF ELEC: 290 MOSM/KG (ref 275–295)
PHOSPHATE SERPL-MCNC: 3.6 MG/DL (ref 2.5–4.9)
PLATELET # BLD AUTO: 240 10(3)UL (ref 150–450)
POTASSIUM SERPL-SCNC: 3.8 MMOL/L (ref 3.5–5.1)
PROT SERPL-MCNC: 7.3 G/DL (ref 6.4–8.2)
PROTHROMBIN TIME: 13.4 SECONDS (ref 11.6–14.8)
RBC # BLD AUTO: 4.51 X10(6)UL
SODIUM SERPL-SCNC: 138 MMOL/L (ref 136–145)
TRIGL SERPL-MCNC: 104 MG/DL (ref 30–149)
TROPONIN I HIGH SENSITIVITY: 6 NG/L
VLDLC SERPL CALC-MCNC: 19 MG/DL (ref 0–30)
WBC # BLD AUTO: 7.1 X10(3) UL (ref 4–11)

## 2023-08-02 PROCEDURE — 93306 TTE W/DOPPLER COMPLETE: CPT | Performed by: STUDENT IN AN ORGANIZED HEALTH CARE EDUCATION/TRAINING PROGRAM

## 2023-08-02 PROCEDURE — 99239 HOSP IP/OBS DSCHRG MGMT >30: CPT | Performed by: HOSPITALIST

## 2023-08-02 RX ORDER — MECLIZINE HYDROCHLORIDE 25 MG/1
25 TABLET ORAL 3 TIMES DAILY PRN
Qty: 15 TABLET | Refills: 0 | Status: SHIPPED | OUTPATIENT
Start: 2023-08-02

## 2023-08-02 NOTE — PLAN OF CARE
Pt is Alert and oriented x4. Tele rhythm NSR O2 saturation 95% on room air Breath sounds clear. Bed is locked and in low position. Call light and personal items within reach. No C/O chest pain or shortness of breath. Pt voiding with no issue. Pt ambulated in hallway W/O issue. Skin dry/Intact. Reviewed plan of care and patient verbalizes understanding. POC   Cards to see  - echo   - trend trops   - lactated ringers        Problem: CARDIOVASCULAR - ADULT  Goal: Maintains optimal cardiac output and hemodynamic stability  Description: INTERVENTIONS:  - Monitor vital signs, rhythm, and trends  - Monitor for bleeding, hypotension and signs of decreased cardiac output  - Evaluate effectiveness of vasoactive medications to optimize hemodynamic stability  - Monitor arterial and/or venous puncture sites for bleeding and/or hematoma  - Assess quality of pulses, skin color and temperature  - Assess for signs of decreased coronary artery perfusion - ex.  Angina  - Evaluate fluid balance, assess for edema, trend weights  Outcome: Progressing  Goal: Absence of cardiac arrhythmias or at baseline  Description: INTERVENTIONS:  - Continuous cardiac monitoring, monitor vital signs, obtain 12 lead EKG if indicated  - Evaluate effectiveness of antiarrhythmic and heart rate control medications as ordered  - Initiate emergency measures for life threatening arrhythmias  - Monitor electrolytes and administer replacement therapy as ordered  Outcome: Progressing

## 2023-08-02 NOTE — PLAN OF CARE
Vitals stable, BP low normal at times but orthostatics negative upon admission. EKG WNL. Echo done. DISCHARGE pending echo results and cards clearance. PT still to see patient, will page now to see patient as it's for pending discharge. Son present all day. Denies need for pain med or dizziness medication PRN. Moving around room well. No issues with ADL's. IVF stopped. PRN meclizine ordered for dc. Will await PT and thoroughly review dc instructions with patient and son at bedside of approved for DISCHARGE. Problem: CARDIOVASCULAR - ADULT  Goal: Maintains optimal cardiac output and hemodynamic stability  Description: INTERVENTIONS:  - Monitor vital signs, rhythm, and trends  - Monitor for bleeding, hypotension and signs of decreased cardiac output  - Evaluate effectiveness of vasoactive medications to optimize hemodynamic stability  - Monitor arterial and/or venous puncture sites for bleeding and/or hematoma  - Assess quality of pulses, skin color and temperature  - Assess for signs of decreased coronary artery perfusion - ex.  Angina  - Evaluate fluid balance, assess for edema, trend weights  8/2/2023 1425 by Noah Crespo RN  Outcome: Progressing  8/2/2023 1425 by Noah Crespo RN  Outcome: Progressing  Goal: Absence of cardiac arrhythmias or at baseline  Description: INTERVENTIONS:  - Continuous cardiac monitoring, monitor vital signs, obtain 12 lead EKG if indicated  - Evaluate effectiveness of antiarrhythmic and heart rate control medications as ordered  - Initiate emergency measures for life threatening arrhythmias  - Monitor electrolytes and administer replacement therapy as ordered  8/2/2023 1425 by Noah Crespo RN  Outcome: Progressing  8/2/2023 1425 by Noah Crespo RN  Outcome: Progressing

## 2023-08-02 NOTE — PROGRESS NOTES
ECG NSR with no acute ischemic changes. Echo with E 60-65%F, no WMA, mild MR.  MD notified. Ok to discharge home from Cardiology standpoint. Follow up as needed. Lipid panel reviewed. Recommend heart healthy diet & exercise.  Consider statin as outpatient

## 2023-08-03 ENCOUNTER — PATIENT OUTREACH (OUTPATIENT)
Dept: CASE MANAGEMENT | Age: 58
End: 2023-08-03

## 2023-08-03 DIAGNOSIS — Z02.9 ENCOUNTERS FOR ADMINISTRATIVE PURPOSE: Primary | ICD-10-CM

## 2023-08-03 LAB
ATRIAL RATE: 82 BPM
P AXIS: 65 DEGREES
P-R INTERVAL: 182 MS
Q-T INTERVAL: 352 MS
QRS DURATION: 82 MS
QTC CALCULATION (BEZET): 411 MS
R AXIS: 47 DEGREES
T AXIS: 53 DEGREES
VENTRICULAR RATE: 82 BPM

## 2023-08-09 ENCOUNTER — OFFICE VISIT (OUTPATIENT)
Dept: INTERNAL MEDICINE CLINIC | Facility: CLINIC | Age: 58
End: 2023-08-09
Payer: COMMERCIAL

## 2023-08-09 VITALS
HEART RATE: 80 BPM | OXYGEN SATURATION: 97 % | HEIGHT: 66 IN | DIASTOLIC BLOOD PRESSURE: 72 MMHG | SYSTOLIC BLOOD PRESSURE: 112 MMHG | TEMPERATURE: 97 F | RESPIRATION RATE: 18 BRPM | BODY MASS INDEX: 27 KG/M2 | WEIGHT: 168 LBS

## 2023-08-09 DIAGNOSIS — G89.29 CHRONIC NECK PAIN: ICD-10-CM

## 2023-08-09 DIAGNOSIS — R42 DIZZINESS: ICD-10-CM

## 2023-08-09 DIAGNOSIS — M51.36 DDD (DEGENERATIVE DISC DISEASE), LUMBAR: ICD-10-CM

## 2023-08-09 DIAGNOSIS — G89.29 CHRONIC HAND PAIN, UNSPECIFIED LATERALITY: ICD-10-CM

## 2023-08-09 DIAGNOSIS — M79.643 CHRONIC HAND PAIN, UNSPECIFIED LATERALITY: ICD-10-CM

## 2023-08-09 DIAGNOSIS — M54.2 CHRONIC NECK PAIN: ICD-10-CM

## 2023-08-09 DIAGNOSIS — K21.9 GASTROESOPHAGEAL REFLUX DISEASE, UNSPECIFIED WHETHER ESOPHAGITIS PRESENT: ICD-10-CM

## 2023-08-09 DIAGNOSIS — E03.9 HYPOTHYROIDISM, UNSPECIFIED TYPE: ICD-10-CM

## 2023-08-09 DIAGNOSIS — R07.9 ACUTE CHEST PAIN: Primary | ICD-10-CM

## 2023-08-09 PROCEDURE — 3074F SYST BP LT 130 MM HG: CPT | Performed by: PHYSICIAN ASSISTANT

## 2023-08-09 PROCEDURE — 3078F DIAST BP <80 MM HG: CPT | Performed by: PHYSICIAN ASSISTANT

## 2023-08-09 PROCEDURE — 3008F BODY MASS INDEX DOCD: CPT | Performed by: PHYSICIAN ASSISTANT

## 2023-08-09 PROCEDURE — 99495 TRANSJ CARE MGMT MOD F2F 14D: CPT | Performed by: PHYSICIAN ASSISTANT

## 2023-08-09 RX ORDER — FAMOTIDINE 20 MG/1
20 TABLET, FILM COATED ORAL 2 TIMES DAILY PRN
Qty: 60 TABLET | Refills: 0 | Status: SHIPPED | OUTPATIENT
Start: 2023-08-09

## 2023-08-09 NOTE — PATIENT INSTRUCTIONS
PATIENT INSTRUCTIONS:    Complete MRI lumbar imaging as recommended by primary care  OP physical therapy ordered and continue massage therapy   your reflux medication - famotidine  Follow up with ENT as scheduled on 8/30

## 2023-08-09 NOTE — PROGRESS NOTES
TRANSITIONAL CARE CLINIC PHARMACIST MEDICATION RECONCILIATION        Rosy Ortiz MRN [id number]    1965 PCP Ar Chu DO       Comments: Medication history completed by the [address] [place] [address] Pharmacist with the patient and daughter in the office. The following medication changes occurred while patient was hospitalized:  Medications Started:  Melizine 25mg tabs - 1 tablet three times daily as needed for dizziness    Medications Stopped:  Methylprednisolone 4mg dose trino    Outpatient Encounter Medications as of 2023   Medication Sig    levothyroxine 88 MCG Oral Tab Take 1 tablet (88 mcg total) by mouth daily. Cholecalciferol (VITAMIN D3) 3000 UNITS Oral Tab Take 1 capsule by mouth daily. meclizine 25 MG Oral Tab Take 1 tablet (25 mg total) by mouth 3 (three) times daily as needed for Dizziness. (Patient not taking: Reported on 2023)    omeprazole 20 MG Oral Capsule Delayed Release Take 1 capsule (20 mg total) by mouth every morning. (Patient not taking: Reported on 2023)     Medication Adherence Assessment:   Patient reports taking the Meclizine only once after discharge. States it made her sleep and she did not like that. Discussed how the Meclizine will help with room spinning dizziness, but to not take for more than a day if possible due to the rebound dizziness effect the medication can have when stopped. Patient reports she is still feeling somewhat off balance at times, but has not had any vertigo since discharge. Patient reports she only takes the Omeprazole as needed when she develops GERD. Counseled the patient that the medication does not neutralize the acid in the stomach, but will stop the production of the acid. Recommended Famotidine 20mg tablets to be taken once or twice daily as needed for the GERD since that will work much faster than the Omeprazole.     Reviewed all medications in detail with patient including dose, indication, timing of administration, monitoring parameters, and potential side effects of medications. Patient confirmed understanding.      Thank you,    Mila Velazquez, PharmD, 8/9/2023, 12:39 PM  [facility]

## 2023-08-28 ENCOUNTER — OFFICE VISIT (OUTPATIENT)
Dept: FAMILY MEDICINE CLINIC | Facility: CLINIC | Age: 58
End: 2023-08-28
Payer: COMMERCIAL

## 2023-08-28 VITALS
OXYGEN SATURATION: 99 % | HEART RATE: 76 BPM | SYSTOLIC BLOOD PRESSURE: 110 MMHG | WEIGHT: 170 LBS | HEIGHT: 66 IN | BODY MASS INDEX: 27.32 KG/M2 | DIASTOLIC BLOOD PRESSURE: 68 MMHG | RESPIRATION RATE: 16 BRPM

## 2023-08-28 DIAGNOSIS — R42 DIZZINESS: ICD-10-CM

## 2023-08-28 DIAGNOSIS — R12 CHRONIC HEARTBURN: Primary | ICD-10-CM

## 2023-08-28 PROBLEM — R07.9 ACUTE CHEST PAIN: Status: RESOLVED | Noted: 2023-08-01 | Resolved: 2023-08-28

## 2023-08-28 PROCEDURE — 3074F SYST BP LT 130 MM HG: CPT | Performed by: PHYSICIAN ASSISTANT

## 2023-08-28 PROCEDURE — 3008F BODY MASS INDEX DOCD: CPT | Performed by: PHYSICIAN ASSISTANT

## 2023-08-28 PROCEDURE — 99214 OFFICE O/P EST MOD 30 MIN: CPT | Performed by: PHYSICIAN ASSISTANT

## 2023-08-28 PROCEDURE — 3078F DIAST BP <80 MM HG: CPT | Performed by: PHYSICIAN ASSISTANT

## 2023-08-28 RX ORDER — SUCRALFATE 1 G/1
1 TABLET ORAL 2 TIMES DAILY
Qty: 60 TABLET | Refills: 2 | Status: SHIPPED | OUTPATIENT
Start: 2023-08-28

## 2023-08-29 PROBLEM — Z86.0101 HISTORY OF ADENOMATOUS POLYP OF COLON: Status: RESOLVED | Noted: 2023-06-27 | Resolved: 2023-08-29

## 2023-08-29 PROBLEM — R19.4 CHANGE IN BOWEL HABITS: Status: RESOLVED | Noted: 2023-06-27 | Resolved: 2023-08-29

## 2023-08-29 PROBLEM — R55 SYNCOPE, NEAR: Status: RESOLVED | Noted: 2023-08-01 | Resolved: 2023-08-29

## 2023-08-29 PROBLEM — D13.1 BENIGN NEOPLASM OF STOMACH: Status: RESOLVED | Noted: 2019-05-29 | Resolved: 2023-08-29

## 2023-08-29 PROBLEM — Z86.010 HISTORY OF ADENOMATOUS POLYP OF COLON: Status: RESOLVED | Noted: 2023-06-27 | Resolved: 2023-08-29

## 2023-08-29 PROBLEM — R13.19 OTHER DYSPHAGIA: Status: RESOLVED | Noted: 2019-05-29 | Resolved: 2023-08-29

## 2023-08-29 PROBLEM — R10.13 EPIGASTRIC PAIN: Status: RESOLVED | Noted: 2019-05-29 | Resolved: 2023-08-29

## 2023-08-29 PROBLEM — R63.5 ABNORMAL WEIGHT GAIN: Status: RESOLVED | Noted: 2023-01-30 | Resolved: 2023-08-29

## 2023-08-29 PROBLEM — R14.1 ABDOMINAL GAS PAIN: Status: RESOLVED | Noted: 2023-06-27 | Resolved: 2023-08-29

## 2023-08-29 PROBLEM — R05.8 RECURRENT COUGH: Status: RESOLVED | Noted: 2023-06-27 | Resolved: 2023-08-29

## 2023-08-29 PROBLEM — R05.9 COUGH: Status: RESOLVED | Noted: 2019-05-29 | Resolved: 2023-08-29

## 2023-09-25 ENCOUNTER — OFFICE VISIT (OUTPATIENT)
Facility: LOCATION | Age: 58
End: 2023-09-25
Payer: COMMERCIAL

## 2023-09-25 DIAGNOSIS — H81.10 BENIGN PAROXYSMAL POSITIONAL VERTIGO, UNSPECIFIED LATERALITY: Primary | ICD-10-CM

## 2023-09-25 DIAGNOSIS — R42 VERTIGO: Primary | ICD-10-CM

## 2023-09-25 NOTE — PROGRESS NOTES
Pamela Harris is a 60year old female. Patient presents with:  Dizziness    HPI:   50-year-old female she was a patient of mine we did a thyroidectomy on many years ago. She has recently noted 3-week duration positional vertigo. She denies otorrhea otalgia tinnitus or hearing loss. There is no trauma history as well. She was seen through the ER had all sorts of heart testing which was negative. Current Outpatient Medications   Medication Sig Dispense Refill    sucralfate 1 g Oral Tab Take 1 tablet (1 g total) by mouth in the morning and 1 tablet (1 g total) before bedtime. 60 tablet 2    famotidine 20 MG Oral Tab Take 1 tablet (20 mg total) by mouth 2 (two) times daily as needed for Heartburn. 60 tablet 0    levothyroxine 88 MCG Oral Tab Take 1 tablet (88 mcg total) by mouth daily. Cholecalciferol (VITAMIN D3) 3000 UNITS Oral Tab Take 1 capsule by mouth daily.         Past Medical History:   Diagnosis Date    Acute maxillary sinusitis     Arthritis     Atypical mole     Back pain     Bad breath     Bloating     Blood in urine     Change in hair     Chest pain     Chronic pain syndrome     Disorder of thyroid     Dysthymic disorder     Dysuria     Easy bruising     Fatigue     Flatulence/gas pain/belching     Frequent urination     Heartburn     Hemorrhoids     High cholesterol     Hoarseness, chronic     Indigestion     Irregular bowel habits     Leaking of urine     Lumbar herniated disc     Pain in joints     Problems with swallowing     Reflux esophagitis     Sleep disturbance     Sprain of lumbar region     Thyroid cancer ([address] 2008    Uncomfortable fullness after meals     Unspecified hypothyroidism     Unspecified otitis media     Unspecified psychophysiological malfunction     Unspecified vitamin D deficiency     Von Willebrand's disease ([address]     Wears glasses     Weight gain     Wheezing       Social History:  Social History     Socioeconomic History    Marital status:    Tobacco Use Smoking status: Never    Smokeless tobacco: Never   Substance and Sexual Activity    Alcohol use: No     Alcohol/week: 0.0 standard drinks of alcohol    Drug use: No    Sexual activity: Not Currently     Partners: Male   Other Topics Concern    Caffeine Concern Yes    Exercise Yes      Past Surgical History:   Procedure Laterality Date    FLUOR GID & [facility]ia [facility] NDL/CATH SPI DX/THER NJX N/A 1/11/2016    Procedure: LUMBAR EPIDURAL;  Surgeon: Gaye Hill MD;  Location: [address] GID & [facility] NDL/CATH SPI DX/THER [address] N/A 2/1/2016    Procedure: LUMBAR EPIDURAL;  Surgeon: Gaye Hill MD;  Location: [address] [place] [address]    INJECTION, ANESTHETIC/STEROID, TRANSFORAMINAL EPIDURAL; LUMBAR/SACRAL, SINGLE LEVEL Right 12/23/2015    Procedure: TRANSFORAMINAL EPIDURAL - LUMBAR;  Surgeon: Gaye Hill MD;  [facility]: [facility]    INJECTION, W/WO CONTRAST, DX/THERAPEUTIC SUBSTANCE, EPIDURAL/SUBARACHNOID; LUMBAR/SACRAL N/A 1/11/2016    Procedure: LUMBAR EPIDURAL;  Surgeon: Gaye Hill MD;  Location: [address] [place] [address], W/WO CONTRAST, DX/THERAPEUTIC SUBSTANCE, EPIDURAL/SUBARACHNOID; LUMBAR/SACRAL N/A 2/1/2016    Procedure: LUMBAR EPIDURAL;  Surgeon: Gaye Hill MD;  Location: [address] [place] [address]    M-SEDAJ BY [facility] [address] [facility] [address]  N SVC 5+ YR Right 12/23/2015    Procedure: TRANSFORAMINAL EPIDURAL - LUMBAR;  Surgeon: Gaye Hill MD;  Location: [address] [place] [address]    NEEDLE BIOPSY RIGHT Right 12/2011    US guided bx  benign    OTHER SURGICAL HISTORY      thyroid surgery    THYROIDECTOMY  2008         REVIEW OF SYSTEMS:   GENERAL HEALTH: feels well otherwise  GENERAL : denies fever, chills, sweats, weight loss, weight gain  SKIN: denies any unusual skin lesions or rashes  RESPIRATORY: denies shortness of breath with exertion  NEURO: denies headaches    EXAM:   There were no vitals taken for this visit.     System Pertinent findings Details   Constitutional  Overall appearance - Normal.   Head/Face  Facial features -- Normal. Skull - Normal.   Eyes  Pupils equal ,round ,react to light and accomidate   Ears  External Ear Right: Normal, Left: Normal. Canal - Right: Normal, Left: Normal. TM - Right: Normal left: Normal   Nose  External Nose, Normal, Septum -midline,Nasal Vault, clear. Turbinates - Right: Normal left: Normal   Mouth/Throat  Lips/teeth/gums - Normal. Tonsils -1+ oropharynx - Normal.   Neck Exam  Inspection - Normal. Palpation - Normal. Parotid gland - Normal. Thyroid gland -previous thyroidectomy   Lymph Detail  Submental. Submandibular. Anterior cervical. Posterior cervical. Supraclavicular. Audiogram today shows normal pure-tone speech and normal A tympanogram    ASSESSMENT AND PLAN:   1. Benign paroxysmal positional vertigo, unspecified laterality  Flynn exercise sheet  If not improved would refer to physical therapy for vestibular exercises      The patient indicates understanding of these issues and agrees to the plan.       Faiza Gonzáles MD  9/25/2023  5:15 PM

## 2023-09-25 NOTE — PROGRESS NOTES
Amatul Hillary Lobe was seen for an audiometric evaluation and tympanogram today. Referred back to physician.     Lennox Reis, MS, CCC-A

## 2023-11-20 ENCOUNTER — OFFICE VISIT (OUTPATIENT)
Dept: FAMILY MEDICINE CLINIC | Facility: CLINIC | Age: 58
End: 2023-11-20
Payer: COMMERCIAL

## 2023-11-20 VITALS
SYSTOLIC BLOOD PRESSURE: 116 MMHG | HEART RATE: 77 BPM | WEIGHT: 168 LBS | BODY MASS INDEX: 27.32 KG/M2 | HEIGHT: 65.75 IN | OXYGEN SATURATION: 98 % | RESPIRATION RATE: 16 BRPM | DIASTOLIC BLOOD PRESSURE: 78 MMHG

## 2023-11-20 DIAGNOSIS — Z12.83 SKIN CANCER SCREENING: ICD-10-CM

## 2023-11-20 DIAGNOSIS — G89.29 CHRONIC LEFT-SIDED LOW BACK PAIN WITH LEFT-SIDED SCIATICA: ICD-10-CM

## 2023-11-20 DIAGNOSIS — Z12.31 VISIT FOR SCREENING MAMMOGRAM: ICD-10-CM

## 2023-11-20 DIAGNOSIS — M54.42 CHRONIC LEFT-SIDED LOW BACK PAIN WITH LEFT-SIDED SCIATICA: ICD-10-CM

## 2023-11-20 DIAGNOSIS — Z00.00 ROUTINE GENERAL MEDICAL EXAMINATION AT A HEALTH CARE FACILITY: Primary | ICD-10-CM

## 2023-11-20 PROCEDURE — 3074F SYST BP LT 130 MM HG: CPT | Performed by: PHYSICIAN ASSISTANT

## 2023-11-20 PROCEDURE — 3008F BODY MASS INDEX DOCD: CPT | Performed by: PHYSICIAN ASSISTANT

## 2023-11-20 PROCEDURE — 3078F DIAST BP <80 MM HG: CPT | Performed by: PHYSICIAN ASSISTANT

## 2023-11-20 PROCEDURE — 99396 PREV VISIT EST AGE 40-64: CPT | Performed by: PHYSICIAN ASSISTANT

## 2023-11-20 NOTE — PROGRESS NOTES
Subjective:   Patient ID: Caroline Donaldson is a 60year old female. HPI  Patient presents today requesting physical exam.      Diet: well balanced  Exercise: regular activity  Tobacco use: denies  Drug use: denies  Alcohol use: denies  Marital status: , 4 children, no grandchildren  Occupation: homemaker     Health maintenance:  Pap/Hpv: 11/3/22 negative  Mammogram: 11/30/22 stable  Performs SBEs: yes  Dexa scan: 11/30/22 osteopenia  Colonoscopy: 7/1/23 polyps, recall 7 years  Discussed age appropriate vaccines    Having ongoing low back pain with left sided sciatica  Comes and goes  Xray lumbar back shows moderate OA of spine  Would like to do PT    History/Other:   Review of Systems   Constitutional:  Negative for chills, fatigue and fever. HENT:  Negative for congestion, ear pain, rhinorrhea and sore throat. Eyes:  Negative for visual disturbance. Respiratory:  Negative for cough, shortness of breath and wheezing. Cardiovascular:  Negative for chest pain, palpitations and leg swelling. Gastrointestinal:  Negative for abdominal pain, diarrhea, nausea and vomiting. Genitourinary:  Negative for dysuria, frequency and hematuria. Musculoskeletal:  Positive for arthralgias (bilateral foot and knee pain) and back pain. Negative for gait problem and myalgias. Skin:  Negative for rash. Neurological:  Negative for weakness, light-headedness and headaches. Hematological:  Negative for adenopathy. Psychiatric/Behavioral:  Negative for dysphoric mood. The patient is not nervous/anxious. Current Outpatient Medications   Medication Sig Dispense Refill    sucralfate 1 g Oral Tab Take 1 tablet (1 g total) by mouth in the morning and 1 tablet (1 g total) before bedtime. 60 tablet 2    famotidine 20 MG Oral Tab Take 1 tablet (20 mg total) by mouth 2 (two) times daily as needed for Heartburn. 60 tablet 0    levothyroxine 88 MCG Oral Tab Take 1 tablet (88 mcg total) by mouth daily. Cholecalciferol (VITAMIN D3) 3000 UNITS Oral Tab Take 1 capsule by mouth daily. Allergies: Allergies   Allergen Reactions    Penicillins RASH    Sulfa Antibiotics RASH and SWELLING    Nsaids RASH     Ibuprofen, Aspirin    Pyrazodine [Phenazopyridine Hcl] RASH     Patient does not remember this medication       Objective:   Physical Exam  Vitals and nursing note reviewed. Constitutional:       General: She is not in acute distress. Appearance: Normal appearance. She is well-developed. HENT:      Head: Normocephalic and atraumatic. Right Ear: Tympanic membrane, ear canal and external ear normal.      Left Ear: Tympanic membrane, ear canal and external ear normal.      Nose: Nose normal.      Mouth/Throat:      Mouth: Mucous membranes are moist.   Eyes:      Extraocular Movements: Extraocular movements intact. Conjunctiva/sclera: Conjunctivae normal.      Pupils: Pupils are equal, round, and reactive to light. Neck:      Thyroid: No thyromegaly. Cardiovascular:      Rate and Rhythm: Normal rate and regular rhythm. Pulses: Normal pulses. Heart sounds: Normal heart sounds. Pulmonary:      Effort: Pulmonary effort is normal.      Breath sounds: Normal breath sounds. No wheezing or rales. Abdominal:      General: Bowel sounds are normal. There is no distension. Palpations: Abdomen is soft. There is no mass. Tenderness: There is no abdominal tenderness. Musculoskeletal:         General: No tenderness. Normal range of motion. Cervical back: Normal range of motion and neck supple. Lymphadenopathy:      Cervical: No cervical adenopathy. Skin:     General: Skin is warm and dry. Findings: No rash. Neurological:      General: No focal deficit present. Mental Status: She is alert and oriented to person, place, and time. Psychiatric:         Mood and Affect: Mood normal.         Behavior: Behavior normal.         Assessment & Plan:   1.  Routine general medical examination at a health care facility  Patient is generally healthy. Physical exam is unremarkable. Check fasting labs. Health maintenance issues discussed. Encouraged routine vaccines. Advised healthy diet and regular exercise. Annual physicals. - Hemoglobin A1C; Future  - CBC With Differential With Platelet; Future  - Comp Metabolic Panel (14); Future  - Lipid Panel; Future  - Vitamin B12; Future  - Vitamin D; Future  - TSH W Reflex To Free T4; Future    2. Visit for screening mammogram  - [facility] ISABELLE 2D+3D SCREENING BILAT (CPT=77067/13077); Future    3. Chronic left-sided low back pain with left-sided sciatica  Ongoing low back pain and numbness/tingling of left leg. Will refer for PT and follow up upon completion for reassessment. - Physical Therapy Referral - [place] [facility]    4.  Skin cancer screening  - Derm Referral - [facility]

## 2023-11-21 ENCOUNTER — LAB ENCOUNTER (OUTPATIENT)
Dept: LAB | Age: 58
End: 2023-11-21
Attending: PHYSICIAN ASSISTANT
Payer: COMMERCIAL

## 2023-11-21 DIAGNOSIS — Z00.00 ROUTINE GENERAL MEDICAL EXAMINATION AT A HEALTH CARE FACILITY: ICD-10-CM

## 2023-11-21 LAB
ALBUMIN SERPL-MCNC: 3.7 G/DL (ref 3.4–5)
ALBUMIN/GLOB SERPL: 1 {RATIO} (ref 1–2)
ALP LIVER SERPL-CCNC: 66 U/L
ALT SERPL-CCNC: 22 U/L
ANION GAP SERPL CALC-SCNC: 5 MMOL/L (ref 0–18)
AST SERPL-CCNC: 22 U/L (ref 15–37)
BASOPHILS # BLD AUTO: 0.05 X10(3) UL (ref 0–0.2)
BASOPHILS NFR BLD AUTO: 1.1 %
BILIRUB SERPL-MCNC: 0.5 MG/DL (ref 0.1–2)
BUN BLD-MCNC: 16 MG/DL (ref 9–23)
CALCIUM BLD-MCNC: 8.9 MG/DL (ref 8.5–10.1)
CHLORIDE SERPL-SCNC: 107 MMOL/L (ref 98–112)
CHOLEST SERPL-MCNC: 219 MG/DL (ref ?–200)
CO2 SERPL-SCNC: 29 MMOL/L (ref 21–32)
CREAT BLD-MCNC: 0.88 MG/DL
EGFRCR SERPLBLD CKD-EPI 2021: 76 ML/MIN/1.73M2 (ref 60–?)
EOSINOPHIL # BLD AUTO: 0.13 X10(3) UL (ref 0–0.7)
EOSINOPHIL NFR BLD AUTO: 3 %
ERYTHROCYTE [DISTWIDTH] IN BLOOD BY AUTOMATED COUNT: 13.6 %
EST. AVERAGE GLUCOSE BLD GHB EST-MCNC: 123 MG/DL (ref 68–126)
FASTING PATIENT LIPID ANSWER: YES
FASTING STATUS PATIENT QL REPORTED: YES
GLOBULIN PLAS-MCNC: 3.7 G/DL (ref 2.8–4.4)
GLUCOSE BLD-MCNC: 98 MG/DL (ref 70–99)
HBA1C MFR BLD: 5.9 % (ref ?–5.7)
HCT VFR BLD AUTO: 42.1 %
HDLC SERPL-MCNC: 66 MG/DL (ref 40–59)
HGB BLD-MCNC: 13.5 G/DL
IMM GRANULOCYTES # BLD AUTO: 0 X10(3) UL (ref 0–1)
IMM GRANULOCYTES NFR BLD: 0 %
LDLC SERPL CALC-MCNC: 140 MG/DL (ref ?–100)
LYMPHOCYTES # BLD AUTO: 1.82 X10(3) UL (ref 1–4)
LYMPHOCYTES NFR BLD AUTO: 41.6 %
MCH RBC QN AUTO: 28.8 PG (ref 26–34)
MCHC RBC AUTO-ENTMCNC: 32.1 G/DL (ref 31–37)
MCV RBC AUTO: 89.8 FL
MONOCYTES # BLD AUTO: 0.31 X10(3) UL (ref 0.1–1)
MONOCYTES NFR BLD AUTO: 7.1 %
NEUTROPHILS # BLD AUTO: 2.07 X10 (3) UL (ref 1.5–7.7)
NEUTROPHILS # BLD AUTO: 2.07 X10(3) UL (ref 1.5–7.7)
NEUTROPHILS NFR BLD AUTO: 47.2 %
NONHDLC SERPL-MCNC: 153 MG/DL (ref ?–130)
OSMOLALITY SERPL CALC.SUM OF ELEC: 293 MOSM/KG (ref 275–295)
PLATELET # BLD AUTO: 251 10(3)UL (ref 150–450)
POTASSIUM SERPL-SCNC: 4.2 MMOL/L (ref 3.5–5.1)
PROT SERPL-MCNC: 7.4 G/DL (ref 6.4–8.2)
RBC # BLD AUTO: 4.69 X10(6)UL
SODIUM SERPL-SCNC: 141 MMOL/L (ref 136–145)
TRIGL SERPL-MCNC: 75 MG/DL (ref 30–149)
TSI SER-ACNC: 1 MIU/ML (ref 0.36–3.74)
VIT B12 SERPL-MCNC: 673 PG/ML (ref 193–986)
VIT D+METAB SERPL-MCNC: 47.7 NG/ML (ref 30–100)
VLDLC SERPL CALC-MCNC: 14 MG/DL (ref 0–30)
WBC # BLD AUTO: 4.4 X10(3) UL (ref 4–11)

## 2023-11-21 PROCEDURE — 80061 LIPID PANEL: CPT | Performed by: PHYSICIAN ASSISTANT

## 2023-11-21 PROCEDURE — 83036 HEMOGLOBIN GLYCOSYLATED A1C: CPT | Performed by: PHYSICIAN ASSISTANT

## 2023-11-21 PROCEDURE — 82607 VITAMIN B-12: CPT | Performed by: PHYSICIAN ASSISTANT

## 2023-11-21 PROCEDURE — 82306 VITAMIN D 25 HYDROXY: CPT | Performed by: PHYSICIAN ASSISTANT

## 2023-11-21 PROCEDURE — 80050 GENERAL HEALTH PANEL: CPT | Performed by: PHYSICIAN ASSISTANT

## 2023-11-23 DIAGNOSIS — R12 CHRONIC HEARTBURN: ICD-10-CM

## 2023-11-24 RX ORDER — SUCRALFATE 1 G/1
TABLET ORAL
Qty: 180 TABLET | Refills: 0 | Status: SHIPPED | OUTPATIENT
Start: 2023-11-24

## 2023-12-16 ENCOUNTER — HOSPITAL ENCOUNTER (OUTPATIENT)
Dept: MAMMOGRAPHY | Age: 58
Discharge: HOME | End: 2023-12-16
Attending: PHYSICIAN ASSISTANT
Payer: COMMERCIAL

## 2023-12-16 DIAGNOSIS — Z12.31 VISIT FOR SCREENING MAMMOGRAM: ICD-10-CM

## 2023-12-16 PROCEDURE — 77067 SCR MAMMO BI INCL CAD: CPT | Performed by: PHYSICIAN ASSISTANT

## 2023-12-16 PROCEDURE — 77063 BREAST TOMOSYNTHESIS BI: CPT | Performed by: PHYSICIAN ASSISTANT

## 2024-01-11 ENCOUNTER — TELEPHONE (OUTPATIENT)
Dept: PHYSICAL THERAPY | Facility: HOSPITAL | Age: 59
End: 2024-01-11

## 2024-01-15 ENCOUNTER — TELEPHONE (OUTPATIENT)
Dept: PHYSICAL THERAPY | Facility: HOSPITAL | Age: 59
End: 2024-01-15

## 2024-01-15 ENCOUNTER — APPOINTMENT (OUTPATIENT)
Dept: PHYSICAL THERAPY | Age: 59
End: 2024-01-15
Attending: PHYSICIAN ASSISTANT
Payer: COMMERCIAL

## 2024-01-17 ENCOUNTER — APPOINTMENT (OUTPATIENT)
Dept: PHYSICAL THERAPY | Age: 59
End: 2024-01-17
Attending: PHYSICIAN ASSISTANT
Payer: COMMERCIAL

## 2024-01-17 ENCOUNTER — TELEPHONE (OUTPATIENT)
Dept: PHYSICAL THERAPY | Facility: HOSPITAL | Age: 59
End: 2024-01-17

## 2024-01-23 DIAGNOSIS — E03.9 HYPOTHYROIDISM, UNSPECIFIED TYPE: ICD-10-CM

## 2024-01-23 DIAGNOSIS — E55.9 VITAMIN D DEFICIENCY: ICD-10-CM

## 2024-01-23 DIAGNOSIS — E06.3 CHRONIC LYMPHOCYTIC THYROIDITIS: Primary | ICD-10-CM

## 2024-01-23 DIAGNOSIS — C73 PAPILLARY CARCINOMA OF THYROID (CMD): ICD-10-CM

## 2024-01-23 RX ORDER — LEVOTHYROXINE SODIUM 88 UG/1
88 TABLET ORAL DAILY
Qty: 90 TABLET | Refills: 2 | OUTPATIENT
Start: 2024-01-23

## 2024-01-24 ENCOUNTER — EXTERNAL LAB (OUTPATIENT)
Dept: ENDOCRINOLOGY | Age: 59
End: 2024-01-24

## 2024-01-24 ENCOUNTER — OFFICE VISIT (OUTPATIENT)
Dept: PHYSICAL THERAPY | Age: 59
End: 2024-01-24
Attending: PHYSICIAN ASSISTANT
Payer: COMMERCIAL

## 2024-01-24 ENCOUNTER — LAB ENCOUNTER (OUTPATIENT)
Dept: LAB | Age: 59
End: 2024-01-24
Attending: NURSE PRACTITIONER
Payer: COMMERCIAL

## 2024-01-24 DIAGNOSIS — G89.29 CHRONIC LEFT-SIDED LOW BACK PAIN WITH LEFT-SIDED SCIATICA: Primary | ICD-10-CM

## 2024-01-24 DIAGNOSIS — E55.9 VITAMIN D DEFICIENCY: ICD-10-CM

## 2024-01-24 DIAGNOSIS — E06.3 CHRONIC LYMPHOCYTIC THYROIDITIS: Primary | ICD-10-CM

## 2024-01-24 DIAGNOSIS — C73 PAPILLARY CARCINOMA OF THYROID (HCC): ICD-10-CM

## 2024-01-24 DIAGNOSIS — M54.42 CHRONIC LEFT-SIDED LOW BACK PAIN WITH LEFT-SIDED SCIATICA: Primary | ICD-10-CM

## 2024-01-24 LAB
25(OH)D3+25(OH)D2 SERPL-MCNC: 46.1 NG/ML (ref 30–100)
ANION GAP SERPL CALC-SCNC: 6 MMOL/L (ref 0–18)
ANION GAP SERPL CALC-SCNC: 6 MMOL/L (ref 0–18)
BUN BLD-MCNC: 20 MG/DL (ref 9–23)
BUN SERPL-MCNC: 20 MG/DL (ref 9–23)
CALCIUM BLD-MCNC: 8.8 MG/DL (ref 8.5–10.1)
CALCIUM SERPL-MCNC: 8.8 MG/DL (ref 8.5–10.1)
CALCULATED OSMO: 294 MOSM/KG (ref 275–295)
CHLORIDE SERPL-SCNC: 107 MMOL/L (ref 98–112)
CHLORIDE SERPL-SCNC: 107 MMOL/L (ref 98–112)
CO2 SERPL-SCNC: 28 MMOL/L (ref 21–32)
CO2 SERPL-SCNC: 28 MMOL/L (ref 21–32)
CREAT BLD-MCNC: 0.89 MG/DL
CREAT SERPL-MCNC: 0.89 MG/DL (ref 0.55–1.02)
EGFRCR SERPLBLD CKD-EPI 2021: 75 ML/MIN/1.73M2 (ref 60–?)
FASTING STATUS PATIENT QL REPORTED: YES
GFR SERPLBLD SCHWARTZ-ARVRAT: 75 ML/MIN/1.73M2
GLUCOSE BLD-MCNC: 84 MG/DL (ref 70–99)
GLUCOSE SERPL-MCNC: 84 MG/DL (ref 70–99)
LAB RESULT: NORMAL
LENGTH OF FAST TIME PATIENT: YES H
OSMOLALITY SERPL CALC.SUM OF ELEC: 294 MOSM/KG (ref 275–295)
PHOSPHATE SERPL-MCNC: 3.9 MG/DL (ref 2.5–4.9)
PHOSPHATE SERPL-MCNC: 3.9 MG/DL (ref 2.5–4.9)
POTASSIUM SERPL-SCNC: 3.9 MMOL/L (ref 3.5–5.1)
POTASSIUM SERPL-SCNC: 3.9 MMOL/L (ref 3.5–5.1)
SODIUM SERPL-SCNC: 141 MMOL/L (ref 136–145)
SODIUM SERPL-SCNC: 141 MMOL/L (ref 136–145)
T4 FREE SERPL-MCNC: 1 NG/DL (ref 0.8–1.7)
T4 FREE SERPL-MCNC: 1 NG/DL (ref 0.8–1.7)
THYROGLOB SERPL-MCNC: <15 U/ML (ref ?–60)
TSH SERPL-ACNC: 0.85 MIU/ML (ref 0.36–3.74)
TSI SER-ACNC: 0.85 MIU/ML (ref 0.36–3.74)
VIT D+METAB SERPL-MCNC: 46.1 NG/ML (ref 30–100)

## 2024-01-24 PROCEDURE — 84100 ASSAY OF PHOSPHORUS: CPT

## 2024-01-24 PROCEDURE — 84439 ASSAY OF FREE THYROXINE: CPT

## 2024-01-24 PROCEDURE — 80048 BASIC METABOLIC PNL TOTAL CA: CPT

## 2024-01-24 PROCEDURE — 97161 PT EVAL LOW COMPLEX 20 MIN: CPT

## 2024-01-24 PROCEDURE — 82306 VITAMIN D 25 HYDROXY: CPT

## 2024-01-24 PROCEDURE — 97110 THERAPEUTIC EXERCISES: CPT

## 2024-01-24 PROCEDURE — 84443 ASSAY THYROID STIM HORMONE: CPT

## 2024-01-24 PROCEDURE — 86800 THYROGLOBULIN ANTIBODY: CPT

## 2024-01-24 PROCEDURE — 36415 COLL VENOUS BLD VENIPUNCTURE: CPT

## 2024-01-24 NOTE — PROGRESS NOTES
SPINE EVALUATION:     Diagnosis:   Chronic left-sided low back pain with left-sided sciatica (M54.42,G89.29)       Referring Provider: Iveth  Date of Evaluation:    1/24/2024    Precautions:  None Next MD visit:   none scheduled  Date of Surgery: n/a     PATIENT SUMMARY   Rosy Gray is a 58 year old female who presents to therapy today with complaints of lower back pain and left LE pain x 1.5 months.  Denies injury. States gradual onset of the pain in November 2023. Went to see GP regarding the pain and referred to OP PT. States she had a hard time getting a PT appt and since she had a similar issue before, she started those exercises which helped with the leg pain which resolved in early December.  States that the lower back pain still persists. Also complaining of neck pain.       Pt describes pain level current 3/10, at best 2/10, at worst 5-6/10.   Current functional limitations include difficulty with getting out of bed in morning, standing >1 -1.5 hours, lots of cooking and cleaning in the house, carrying groceries, bending forward      Amatul describes prior level of function ability to stand, get out of bed with minimal to no LBP. Pt goals include get rid of LBP.  Past medical history was reviewed with Roys. Significant findings include   Past Medical History:   Diagnosis Date    Acute maxillary sinusitis     Arthritis     Atypical mole     Back pain     Bad breath     Bloating     Blood in urine     Change in hair     Chest pain     Chronic pain syndrome     Disorder of thyroid     Dysthymic disorder     Dysuria     Easy bruising     Fatigue     Flatulence/gas pain/belching     Frequent urination     Heartburn     Hemorrhoids     High cholesterol     Hoarseness, chronic     Indigestion     Irregular bowel habits     Leaking of urine     Lumbar herniated disc     Pain in joints     Problems with swallowing     Reflux esophagitis     Sleep disturbance     Sprain of lumbar region     Thyroid  cancer (HCC) 2008    Uncomfortable fullness after meals     Unspecified hypothyroidism     Unspecified otitis media     Unspecified psychophysiological malfunction     Unspecified vitamin D deficiency     Von Willebrand's disease (HCC)     Wears glasses     Weight gain     Wheezing        Pt denies bowel/bladder changes, saddle anesthesia, and JARET LE N/T.    ASSESSMENT  Amatul presents to physical therapy evaluation with primary c/o lower back pain with intermittent L LE pain. The results of the objective tests and measures show impaired posture, gait, flexibility, ROM, joint mobility, muscle performance and motor function.  Functional deficits include but are not limited to difficulty with getting out of bed in morning, standing >1 -1.5 hours, lots of cooking and cleaning in the house, carrying groceries, bending forward.  Signs and symptoms are consistent with diagnosis of lumbar radiculopathy. Pt and PT discussed evaluation findings, pathology, POC and HEP.  Pt voiced understanding and performs HEP correctly without reported pain. Skilled Physical Therapy is medically necessary to address the above impairments and reach functional goals.     OBJECTIVE:   Observation/Posture: decreased lordosis, iliac crests level  Neuro Screen: Sensation intact to light touch.      lumbar AROM: (* denotes performed with pain)  Flexion: 65 deg*  Extension: 30 deg *  Sidebending: R 10 deg*; L 15 deg  Rotation: R 50%; L 25%*    Accessory motion: lumbar spine: pain with CPA L1-5  Palpation: moderate tenderness on palpation of lumbar paraspinals, bilat    Strength: (* denotes performed with pain)  LE   Hip flexion (L2): R 5/5; L 5/5  Hip abduction: R 4/5; L 4/5  Hip Extension: R 4/5; L 4/5   Hip ER: R 4/5; L 4/5  Hip IR: R 4/5; L 4/5  Knee Flexion: R 5/5; L 5/5   Knee extension (L3): R 5/5; L 5/5   DF (L4): R 5/5; L 5/5  Great Toe Ext (L5): R 5/5, L 5/5  PF (S1): R 5/5; L 5/5     Flexibility:   LE   Hip Flexor: RWNL, L  WNL  Hamstrings: R 90 deg; L 80 deg  Piriformis: R WNL; L WNL     Special tests:   Slump: neg  SLR: R neg, L neg    Gait: pt ambulates on level ground with decreased arm swing and decreased trunk rotation .    Today’s Treatment and Response:   Pt education was provided on exam findings, treatment diagnosis, treatment plan, expectations, and prognosis. Pt was also provided recommendations for activity modifications, possible soreness after evaluation, modalities as needed [ice/heat], postural corrections, ergonomics, and importance of remaining active  Patient was instructed in and issued a HEP for: See AVS  Access Code: [email or web address]  URL: [email or web address]  Date: 01/24/2024  Prepared by: Bhakti Baron    Exercises  - Supine Posterior Pelvic Tilt  - 3 x daily - 7 x weekly - 1 sets - 10 reps - 10 sec hold  - Supine Lower Trunk Rotation  - 3 x daily - 7 x weekly - 1 sets - 10 reps - 3 sec hold  - Supine Bridge  - 3 x daily - 7 x weekly - 1 sets - 10 reps - 5 sec hold  - Supine Single Knee to Chest Stretch  - 3 x daily - 7 x weekly - 1 sets - 5 reps - 30 sec hold  - Clamshell  - 3 x daily - 7 x weekly - 1 sets - 10 reps - 3 sec hold      Charges: PT Eval Low Complexity, Ex 1      Total Timed Treatment: 10 min     Total Treatment Time: 45 min     Based on clinical rationale and outcome measures, this evaluation involved Low Complexity decision making  PLAN OF CARE:    Goals: (to be met in 10 visits)   Pt will improve transversus abdominis recruitment to perform proper isometric contraction without requiring verbal or tactile cuing to promote advancement of therex   Pt will demonstrate good understanding of proper posture and body mechanics to decrease pain and improve spinal safety   Pt will improve lumbar spine AROM flexion to >80 deg to allow increase ease with bending forward to don shoes   Pt will report improved symptom centralization and absence of radicular symptoms for 3 consecutive days to improve function  with ADL   Pt will have decreased paraspinal mm tension to tolerate standing >45 minutes for home activities with minimal LBP  Pt will be independent and compliant with comprehensive HEP to maintain progress achieved in PT       Frequency / Duration: Patient will be seen for 2 x/week or a total of 10 visits over a 90 day period. Treatment will include: Manual Therapy, Neuromuscular Re-education, Therapeutic Exercise, Home Exercise Program instruction, and Modalities to include: Electrical stimulation (unattended)-PRN    Education or treatment limitation: None  Rehab Potential:good    Oswestry Disability Index Score  No data recorded    Patient/Family/Caregiver was advised of these findings, precautions, and treatment options and has agreed to actively participate in planning and for this course of care.    Thank you for your referral. Please co-sign or sign and return this letter via fax as soon as possible to [phone]. If you have any questions, please contact me at Dept: [phone]    Sincerely,  Electronically signed by therapist: Bhakti Baron, PT    Physician's certification required: Yes  I certify the need for these services furnished under this plan of treatment and while under my care.    X___________________________________________________ Date____________________    Certification From: 1/24/2024  To:4/23/2024

## 2024-01-24 NOTE — PATIENT INSTRUCTIONS
Access Code: [email or web address]  URL: [email or web address]  Date: 01/24/2024  Prepared by: Bhakti Baron    Exercises  - Supine Posterior Pelvic Tilt  - 3 x daily - 7 x weekly - 1 sets - 10 reps - 10 sec hold  - Supine Lower Trunk Rotation  - 3 x daily - 7 x weekly - 1 sets - 10 reps - 3 sec hold  - Supine Bridge  - 3 x daily - 7 x weekly - 1 sets - 10 reps - 5 sec hold  - Supine Single Knee to Chest Stretch  - 3 x daily - 7 x weekly - 1 sets - 5 reps - 30 sec hold  - Clamshell  - 3 x daily - 7 x weekly - 1 sets - 10 reps - 3 sec hold

## 2024-01-30 ENCOUNTER — APPOINTMENT (OUTPATIENT)
Dept: ENDOCRINOLOGY | Age: 59
End: 2024-01-30

## 2024-01-30 VITALS
BODY MASS INDEX: 27.42 KG/M2 | SYSTOLIC BLOOD PRESSURE: 107 MMHG | WEIGHT: 170.64 LBS | OXYGEN SATURATION: 95 % | TEMPERATURE: 97.5 F | DIASTOLIC BLOOD PRESSURE: 76 MMHG | HEART RATE: 72 BPM | HEIGHT: 66 IN

## 2024-01-30 DIAGNOSIS — E03.9 HYPOTHYROIDISM, UNSPECIFIED TYPE: ICD-10-CM

## 2024-01-30 PROCEDURE — 99214 OFFICE O/P EST MOD 30 MIN: CPT | Performed by: INTERNAL MEDICINE

## 2024-01-30 PROCEDURE — G2211 COMPLEX E/M VISIT ADD ON: HCPCS | Performed by: INTERNAL MEDICINE

## 2024-01-30 RX ORDER — FAMOTIDINE 20 MG/1
20 TABLET, FILM COATED ORAL 2 TIMES DAILY PRN
COMMUNITY
Start: 2023-08-09

## 2024-01-30 RX ORDER — LEVOTHYROXINE SODIUM 88 UG/1
88 TABLET ORAL DAILY
Qty: 90 TABLET | Refills: 3 | Status: SHIPPED | OUTPATIENT
Start: 2024-01-30

## 2024-01-31 ENCOUNTER — OFFICE VISIT (OUTPATIENT)
Dept: PHYSICAL THERAPY | Age: 59
End: 2024-01-31
Attending: PHYSICIAN ASSISTANT
Payer: COMMERCIAL

## 2024-01-31 PROCEDURE — 97110 THERAPEUTIC EXERCISES: CPT

## 2024-01-31 PROCEDURE — 97112 NEUROMUSCULAR REEDUCATION: CPT

## 2024-01-31 NOTE — PROGRESS NOTES
Diagnosis:   Chronic left-sided low back pain with left-sided sciatica (M54.42,G89.29)          Referring Provider: Iveth  Date of Evaluation:    1/24/2024    Precautions:  None Next MD visit:   none scheduled  Date of Surgery: n/a   Insurance Primary/Secondary: [employer] / N/A     # Auth Visits: 10 auth   until 4/24/24       Subjective: no new problems. States that she has a little LBP today VAS 2-3/10. Working on HEP 2x/day. Has a little difficulty with pelvic tilt and clamshells    Pain: 2-3/10 low back      Objective:   lumbar AROM: (* denotes performed with pain)  Flexion: 65 deg*  Extension: 30 deg *  Sidebending: R 10 deg*; L 15 deg  Rotation: R 50%; L 25%    Strength: (* denotes performed with pain)  LE   Hip flexion (L2): R 5/5; L 5/5  Hip abduction: R 4/5; L 4/5  Hip Extension: R 4/5; L 4/5            Hip ER: R 4/5; L 4/5  Hip IR: R 4/5; L 4/5  Knee Flexion: R 5/5; L 5/5            Knee extension (L3): R 5/5; L 5/5            DF (L4): R 5/5; L 5/5  Great Toe Ext (L5): R 5/5, L 5/5  PF (S1): R 5/5; L 5/5      Flexibility:   LE   Hip Flexor: RWNL, L WNL  Hamstrings: R 90 deg; L 80 deg  Piriformis: R WNL; L WNL       Assessment: completed todays treatment without c/o increased pain or discomfort following treatment. Reviewed HEP and pt performs correctly but did require tacile cues to avoid pelvic rotation during clamshells. Good tolerance for LP stabilization training today.  No LE symptoms during treatment today. Lower back pain reported as improved post treatment.       Goals:   to be met in 10 visits)   Pt will improve transversus abdominis recruitment to perform proper isometric contraction without requiring verbal or tactile cuing to promote advancement of therex   Pt will demonstrate good understanding of proper posture and body mechanics to decrease pain and improve spinal safety   Pt will improve lumbar spine AROM flexion to >80 deg to allow increase ease with bending forward to don shoes   Pt  will report improved symptom centralization and absence of radicular symptoms for 3 consecutive days to improve function with ADL   Pt will have decreased paraspinal mm tension to tolerate standing >45 minutes for home activities with minimal LBP  Pt will be independent and compliant with comprehensive HEP to maintain progress achieved in PT     Plan: continue POC. Progress as tolerated.   Date: 1/31/2024  TX#: 2/10 Date:                 TX#: 3/ Date:                 TX#: 4/ Date:                 TX#: 5/ Date:   Tx#: 6/   NU step L2 x6 min       RTB lateral and monster band walking 35ft x 2       Reformer   4 bands double knee ext 3x20 reps  R/L SL 2 bands 2x10 reps       R/L LE lead ASU 4\" step x10 ea       Modified downward dog 3x 10 sec       Hkly: pelvic tilt 10x10 sec  Hkly: LTR x10 ea  Hkly: bridges 10x 5 sec  Supine: SKTC 5 x30 sec  R/L s/l clamshells x10 ea       Hkly: bilat hip abd RTB 2x10       L/R s/l: LP rotation mobilization Gr 3 5 x10 sec botus                            HEP: Access Code: [email or web address]  URL: [email or web address]  Date: 01/24/2024  Prepared by: Bhakti Baron     Exercises  - Supine Posterior Pelvic Tilt  - 3 x daily - 7 x weekly - 1 sets - 10 reps - 10 sec hold  - Supine Lower Trunk Rotation  - 3 x daily - 7 x weekly - 1 sets - 10 reps - 3 sec hold  - Supine Bridge  - 3 x daily - 7 x weekly - 1 sets - 10 reps - 5 sec hold  - Supine Single Knee to Chest Stretch  - 3 x daily - 7 x weekly - 1 sets - 5 reps - 30 sec hold  - Clamshell  - 3 x daily - 7 x weekly - 1 sets - 10 reps - 3 sec hold    Charges: Ex 2 (35) Nreed 1 (10)       Total Timed Treatment: 45 min  Total Treatment Time: 45 min

## 2024-02-07 ENCOUNTER — TELEPHONE (OUTPATIENT)
Dept: PHYSICAL THERAPY | Facility: HOSPITAL | Age: 59
End: 2024-02-07

## 2024-02-07 ENCOUNTER — APPOINTMENT (OUTPATIENT)
Dept: PHYSICAL THERAPY | Age: 59
End: 2024-02-07
Attending: PHYSICIAN ASSISTANT
Payer: COMMERCIAL

## 2024-02-12 ENCOUNTER — OFFICE VISIT (OUTPATIENT)
Dept: PHYSICAL THERAPY | Age: 59
End: 2024-02-12
Attending: PHYSICIAN ASSISTANT
Payer: COMMERCIAL

## 2024-02-12 PROCEDURE — 97112 NEUROMUSCULAR REEDUCATION: CPT

## 2024-02-12 PROCEDURE — 97110 THERAPEUTIC EXERCISES: CPT

## 2024-02-12 NOTE — PROGRESS NOTES
Diagnosis:   Chronic left-sided low back pain with left-sided sciatica (M54.42,G89.29)          Referring Provider: Iveth  Date of Evaluation:    1/24/2024    Precautions:  None Next MD visit:   none scheduled  Date of Surgery: n/a   Insurance Primary/Secondary: [employer] / N/A     # Auth Visits: 10 auth   until 4/24/24       Subjective: no new problems. States that she has been feeling better with her lower back pain since last session. The pain that she was having in the right low back has resolved but still feels soreness in the spine.     Pain: 0/10 low back       Objective:   lumbar AROM: (* denotes performed with pain)  Flexion: 65 deg*  Extension: 30 deg *  Sidebending: R 10 deg*; L 15 deg  Rotation: R 50%; L 25%    Strength: (* denotes performed with pain)  LE   Hip flexion (L2): R 5/5; L 5/5  Hip abduction: R 4/5; L 4/5  Hip Extension: R 4/5; L 4/5            Hip ER: R 4/5; L 4/5  Hip IR: R 4/5; L 4/5  Knee Flexion: R 5/5; L 5/5            Knee extension (L3): R 5/5; L 5/5            DF (L4): R 5/5; L 5/5  Great Toe Ext (L5): R 5/5, L 5/5  PF (S1): R 5/5; L 5/5      Flexibility:   LE   Hip Flexor: RWNL, L WNL  Hamstrings: R 90 deg; L 80 deg  Piriformis: R WNL; L WNL       Assessment: completed todays treatment without c/o increased pain or discomfort following treatment. Still requires tactile cues for avoiding pelvic rotation during clamshells. Improved lumbar mobility following LP rotation mobilization. Good tolerance for increased resistance with reformer. Making very good progress with back pain and function.      Goals:   to be met in 10 visits)   Pt will improve transversus abdominis recruitment to perform proper isometric contraction without requiring verbal or tactile cuing to promote advancement of therex   Pt will demonstrate good understanding of proper posture and body mechanics to decrease pain and improve spinal safety   Pt will improve lumbar spine AROM flexion to >80 deg to allow  increase ease with bending forward to don shoes   Pt will report improved symptom centralization and absence of radicular symptoms for 3 consecutive days to improve function with ADL   Pt will have decreased paraspinal mm tension to tolerate standing >45 minutes for home activities with minimal LBP  Pt will be independent and compliant with comprehensive HEP to maintain progress achieved in PT     Plan: continue POC. Progress as tolerated.   Date: 1/31/2024  TX#: 2/10 Date: 2/12/2024               TX#: 3/10 Date:                 TX#: 4/ Date:                 TX#: 5/ Date:   Tx#: 6/   NU step L2 x6 min Nu step L2 x6 min      RTB lateral and monster band walking 35ft x 2 RTB lateral and monster band walking 35ft x 2      Reformer   4 bands double knee ext 3x20 reps  R/L SL 2 bands 2x10 reps Reformer   4 bands double knee ext 3x20 reps  R/L SL 2 bands 2x10 reps      R/L LE lead ASU 4\" step x10 ea R/L LE lead ASU 6\" step x10 ea      Modified downward dog 3x 10 sec Modified downward dog 3x 10 sec      Hkly: pelvic tilt 10x10 sec  Hkly: LTR x10 ea  Hkly: bridges 10x 5 sec  Supine: SKTC 5 x30 sec  R/L s/l clamshells x10 ea Hkly: pelvic tilt 10x10 sec  Hkly: LTR x10 ea  Hkly: bridges 12x 5 sec  Supine: SKTC 5 x30 sec  R/L s/l clamshells x10 ea      Hkly: bilat hip abd RTB 2x10 Hkly: bilat hip abd RTB 2x10      L/R s/l: LP rotation mobilization Gr 3 5 x10 sec botus L/R s/l: LP rotation mobilization Gr 3 5 x10 sec bouts                           HEP: Access Code: [email or web address]  URL: [email or web address]  Date: 01/24/2024  Prepared by: Bhakti Baron     Exercises  - Supine Posterior Pelvic Tilt  - 3 x daily - 7 x weekly - 1 sets - 10 reps - 10 sec hold  - Supine Lower Trunk Rotation  - 3 x daily - 7 x weekly - 1 sets - 10 reps - 3 sec hold  - Supine Bridge  - 3 x daily - 7 x weekly - 1 sets - 10 reps - 5 sec hold  - Supine Single Knee to Chest Stretch  - 3 x daily - 7 x weekly - 1 sets - 5 reps - 30 sec hold  - Clamshell  - 3  x daily - 7 x weekly - 1 sets - 10 reps - 3 sec hold    Charges: Ex 2 (35) Nreed 1 (10)       Total Timed Treatment: 45 min  Total Treatment Time: 45 min

## 2024-02-14 ENCOUNTER — OFFICE VISIT (OUTPATIENT)
Dept: PHYSICAL THERAPY | Age: 59
End: 2024-02-14
Attending: PHYSICIAN ASSISTANT
Payer: COMMERCIAL

## 2024-02-14 PROCEDURE — 97112 NEUROMUSCULAR REEDUCATION: CPT

## 2024-02-14 PROCEDURE — 97110 THERAPEUTIC EXERCISES: CPT

## 2024-02-14 NOTE — PROGRESS NOTES
Diagnosis:   Chronic left-sided low back pain with left-sided sciatica (M54.42,G89.29)          Referring Provider: Johnathanhiantwon  Date of Evaluation:    1/24/2024    Precautions:  None Next MD visit:   none scheduled  Date of Surgery: n/a   Insurance Primary/Secondary: [employer] [place] P[place] / N/A     # Auth Visits: 10 auth   until 4/24/24       Subjective: no new problems. States she felt good after last session. Yesterday she had to search through some boxes for an outfit so the bending over bothered her back a little. Had some of the pain return on the right side of her back and hip. Better today and mild general LBP present pre-rx. Has been feeling her neck and shoulder pain coming back the past few weeks and wants to start the treatment on that now too  Pain: 1-2/10 low back       Objective:   lumbar AROM: (* denotes performed with pain)  Flexion: 65 deg*  Extension: 30 deg *  Sidebending: R 10 deg*; L 15 deg  Rotation: R 50%; L 25%    Strength: (* denotes performed with pain)  LE   Hip flexion (L2): R 5/5; L 5/5  Hip abduction: R 4/5; L 4/5  Hip Extension: R 4/5; L 4/5            Hip ER: R 4/5; L 4/5  Hip IR: R 4/5; L 4/5  Knee Flexion: R 5/5; L 5/5            Knee extension (L3): R 5/5; L 5/5            DF (L4): R 5/5; L 5/5  Great Toe Ext (L5): R 5/5, L 5/5  PF (S1): R 5/5; L 5/5      Flexibility:   LE   Hip Flexor: RWNL, L WNL  Hamstrings: R 90 deg; L 80 deg  Piriformis: R WNL; L WNL       Assessment: completed todays treatment without c/o increased pain or discomfort following treatment. LP stabilization improving and requires less cues for form with clamshells and hip abd in s/l. LBP is improving but she still needs reminders regarding ergonomics with bending and lifting. She still has global hip and core weakness but performing HEP correctly. LE symptoms are centralizing.    Goals:   to be met in 10 visits)   Pt will improve transversus abdominis recruitment to perform proper isometric contraction without requiring  verbal or tactile cuing to promote advancement of therex   Pt will demonstrate good understanding of proper posture and body mechanics to decrease pain and improve spinal safety   Pt will improve lumbar spine AROM flexion to >80 deg to allow increase ease with bending forward to don shoes   Pt will report improved symptom centralization and absence of radicular symptoms for 3 consecutive days to improve function with ADL   Pt will have decreased paraspinal mm tension to tolerate standing >45 minutes for home activities with minimal LBP  Pt will be independent and compliant with comprehensive HEP to maintain progress achieved in PT     Plan: continue POC. Progress as tolerated.   Date: 1/31/2024  TX#: 2/10 Date: 2/12/2024               TX#: 3/10 Date:  2/14/2024               TX#: 4/10 Date:                 TX#: 5/ Date:   Tx#: 6/   NU step L2 x6 min Nu step L2 x6 min NU step L3 x6 min     RTB lateral and monster band walking 35ft x 2 RTB lateral and monster band walking 35ft x 2 GTB lateral and monster band walking 35ft x 2     Reformer   4 bands double knee ext 3x20 reps  R/L SL 2 bands 2x10 reps Reformer   4 bands double knee ext 3x20 reps  R/L SL 2 bands 2x10 reps Reformer   5 bands double knee ext 3x20 reps  R/L SL 3 bands 2x10 reps     R/L LE lead ASU 4\" step x10 ea R/L LE lead ASU 6\" step x10 ea      Modified downward dog 3x 10 sec Modified downward dog 3x 10 sec Modified downward dog 3x 10 sec     Hkly: pelvic tilt 10x10 sec  Hkly: LTR x10 ea  Hkly: bridges 10x 5 sec  Supine: SKTC 5 x30 sec  R/L s/l clamshells x10 ea Hkly: pelvic tilt 10x10 sec  Hkly: LTR x10 ea  Hkly: bridges 12x 5 sec  Supine: SKTC 5 x30 sec  R/L s/l clamshells x10 ea Hkly: pelvic tilt 10x10 sec  Hkly: LTR x10 ea  Hkly: bridges 15x 5 sec  Supine: SKTC 5 x30 sec  R/L s/l clamshells x10 ea  R/L s/l hip abd x10 ea     Hkly: bilat hip abd RTB 2x10 Hkly: bilat hip abd RTB 2x10      L/R s/l: LP rotation mobilization Gr 3 5 x10 sec botus L/R s/l:  LP rotation mobilization Gr 3 5 x10 sec bouts L/R s/l: LP rotation mobilization Gr 3 5 x10 sec bouts                          HEP: Access Code: [email or web address]  URL: [email or web address]  Date: 01/24/2024  Prepared by: Bhakti Baron     Exercises  - Supine Posterior Pelvic Tilt  - 3 x daily - 7 x weekly - 1 sets - 10 reps - 10 sec hold  - Supine Lower Trunk Rotation  - 3 x daily - 7 x weekly - 1 sets - 10 reps - 3 sec hold  - Supine Bridge  - 3 x daily - 7 x weekly - 1 sets - 10 reps - 5 sec hold  - Supine Single Knee to Chest Stretch  - 3 x daily - 7 x weekly - 1 sets - 5 reps - 30 sec hold  - Clamshell  - 3 x daily - 7 x weekly - 1 sets - 10 reps - 3 sec hold    Charges: Ex 2 (35) Nreed 1 (10)       Total Timed Treatment: 45 min  Total Treatment Time: 45 min

## 2024-02-15 ENCOUNTER — OFFICE VISIT (OUTPATIENT)
Dept: PHYSICAL THERAPY | Age: 59
End: 2024-02-15
Attending: PHYSICIAN ASSISTANT
Payer: COMMERCIAL

## 2024-02-15 DIAGNOSIS — M54.2 CERVICALGIA: Primary | ICD-10-CM

## 2024-02-15 PROCEDURE — 97162 PT EVAL MOD COMPLEX 30 MIN: CPT

## 2024-02-15 PROCEDURE — 97110 THERAPEUTIC EXERCISES: CPT

## 2024-02-15 NOTE — PATIENT INSTRUCTIONS
Access Code: [email or web address]  URL: [email or web address]  Date: 02/15/2024  Prepared by: Bhakti Baron    Exercises  - Standing shoulder flexion wall slides  - 3 x daily - 7 x weekly - 1 sets - 10 reps - 2 sec hold  - Doorway Pec Stretch at 60 Elevation  - 3 x daily - 7 x weekly - 1 sets - 3 reps - 10 sec hold  - Standing Shoulder Row with Anchored Resistance  - 3 x daily - 7 x weekly - 1 sets - 15 reps - 3 sec hold  - Thoracic Extension Mobilization on Foam Roll  - 1 x daily - 7 x weekly - 3 sets - 10 reps  - Radial Nerve Flossing  - 3 x daily - 7 x weekly - 1 sets - 10 reps  - Median Nerve Flossing - Tray  - 3 x daily - 7 x weekly - 1 sets - 10 reps

## 2024-02-15 NOTE — PROGRESS NOTES
SPINE EVALUATION:     Diagnosis:   Cervicalgia (M54.2)       Referring Provider: Iveth  Date of Evaluation:    2/15/2024    Precautions:  None Next MD visit:   none scheduled  Date of Surgery: n/a     PATIENT SUMMARY   Rosy Gray is a 58 year old female who presents to therapy today with complaints of acute on chronic neck and left arm pain. Also feels some pain in the right arm too but not as bad as the left arm.  States last week she woke up with increased neck and left arm pain. Also having a hard time sleeping at night due to pain. Had PT previously with good outcomes.  Primary care referred her to PT.      Pt describes pain level current 3/10, at best 1-2/10, at worst 4/10.   Current functional limitations include difficulty with sleeping, reaching OH, lifting >1 gallon milk, turning head towards right.     Amatul describes prior level of function able to sleep, reach, lift, turn head with minimal to no pain . Pt goals include get rid of the pain.  Past medical history was reviewed with Rosy. Significant findings include   Past Medical History:   Diagnosis Date    Acute maxillary sinusitis     Arthritis     Atypical mole     Back pain     Bad breath     Bloating     Blood in urine     Change in hair     Chest pain     Chronic pain syndrome     Disorder of thyroid     Dysthymic disorder     Dysuria     Easy bruising     Fatigue     Flatulence/gas pain/belching     Frequent urination     Heartburn     Hemorrhoids     High cholesterol     Hoarseness, chronic     Indigestion     Irregular bowel habits     Leaking of urine     Lumbar herniated disc     Pain in joints     Problems with swallowing     Reflux esophagitis     Sleep disturbance     Sprain of lumbar region     Thyroid cancer (HCC) 2008    Uncomfortable fullness after meals     Unspecified hypothyroidism     Unspecified otitis media     Unspecified psychophysiological malfunction     Unspecified vitamin D deficiency     Von Willebrand's  disease (HCC)     Wears glasses     Weight gain     Wheezing        Pt denies diplopia, dysarthria, dysphasia, drop attacks. Has mild intermittent dizziness-- was in ER for dizziness in Aug/sept 2023. Diagnosed with BPPV.       ASSESSMENT  Amatul presents to physical therapy evaluation with primary c/o neck and bilat shoulder/arm pain. The results of the objective tests and measures show impaired posture, flexibility, ROM, joint mobility, muscle performance and motor function.  Functional deficits include but are not limited to difficulty with sleeping, reaching OH, lifting >1 gallon milk, turning head towards right.  Signs and symptoms are consistent with diagnosis of cervical radiculopathy and rotator cuff tendonitis. Pt and PT discussed evaluation findings, pathology, POC and HEP.  Pt voiced understanding and performs HEP correctly without reported pain. Skilled Physical Therapy is medically necessary to address the above impairments and reach functional goals.     OBJECTIVE:   Observation/Posture: rounded shoulder posture  Neuro Screen: Sensation intact to light touch.    cervical AROM: (* denotes performed with pain)  Flexion: 50 deg  Extension: 60 deg*  Sidebending: R 28 deg* pain on L; L 25 deg* pain on R  Rotation: R 63 deg*; L 70 deg    Shoulder AROM: WNL all planes but pain end range all planes    Accessory motion: thoracic spine: pain and hypomobility with CPA  Palpation: significant tenderness bilat UT R>L    Strength: (* denotes performed with pain)  UE/Scapular   Shoulder Flex: R 5/5*, L 5/5*  Shoulder ABD (C5): R 4+/5*, L 4+/5*  Biceps (C6): R 5/5, L 5/5  Wrist ext (C6): R 5/5, L 5/5  Triceps (C7): R 5/5, L 5/5  Wrist Flex (C7): R 5/5, L 5/5  Digit Flex (C8): R 5/5, L 5/5  Thumb Ext (C8): R 5/5, L 5/5  Interossei (T1): R 5/5, L 5/5    Rhomboids: R 4/5, L 4/5  Mid trap: R 4/5; L 4/5     Flexibility:   UE/Scapular   Upper Trap: R/L Moderate restrictions;   Levator Scap: R/L: mild restrictions  Pec  Major: R/L: Moderate restrictions       Special tests:     Subacromial Pain Syndrome:  Painful Arc Sign: R neg, L neg  External Rotation Resistance: R Pos, L neg  Saunders-Xiang Impingement Sign: R pos, L pos  Neer Impingement Test: R neg, L neg  Palpable Tenderness Infraspinatus and Supraspinatus: R Pos, L pos    Rotator Cuff Tears:   ER Lag Sign: R neg, L neg  Dropping Sign at 90 Deg ABD and 45 deg ER: R neg, L neg  IR Lag Sign: R neg, L neg      Cervical Compression: neg  Cervical Distraction: pos for alleviation  ULTT: pos: R: median, R/L radial    Today’s Treatment and Response: PT eval. HEP taught performed and issued  Pt education was provided on exam findings, treatment diagnosis, treatment plan, expectations, and prognosis. Pt was also provided recommendations for activity modifications, possible soreness after evaluation, postural corrections, and importance of remaining active  Patient was instructed in and issued a HEP for: see AVS    Charges: PT Eval Moderate Complexity, Ex 1      Total Timed Treatment: 10 min     Total Treatment Time: 45 min     Based on clinical rationale and outcome measures, this evaluation involved Moderate Complexity decision making due to 1-2 personal factors/comorbidities, 3+ body structures involved/activity limitations, and evolving symptoms including changing pain levels.  PLAN OF CARE:    Goals: (to be met in 10 visits)      Pt will improve cervical AROM rotation to >70 degrees to improve tolerance for turning head to check blind spot while driving  Pt will have improved thoracic PA mobility to WNL to improve cervical ROM as well as promote upright posturing and decreased pain with lifting > gallon of milk   Improve bilat shoulder AROM to minimal pain/ no pain all planes for self care and ADLS  Pt will improve postural strength (mid/low trap) to 4+/5 to promote improved upright posturing and decreased pain with carrying groceries   Centralize R/L UE symptoms  Pt will be  independent and compliant with comprehensive HEP to maintain progress achieved in PT       Frequency / Duration: Patient will be seen for 1-2 x/week or a total of 10 visits over a 90 day period. Treatment will include:   Manual Therapy, Mechanical Traction, Neuromuscular Re-education, Therapeutic Exercise, Home Exercise Program instruction, and Modalities to include: Electrical stimulation (unattended)PRN  Education or treatment limitation: None  Rehab Potential:good    Neck Disability Index Score  No data recorded    Patient/Family/Caregiver was advised of these findings, precautions, and treatment options and has agreed to actively participate in planning and for this course of care.    Thank you for your referral. Please co-sign or sign and return this letter via fax as soon as possible to [phone]. If you have any questions, please contact me at Dept: [phone]    Sincerely,  Electronically signed by therapist: Bhakti Baron, PT    Physician's certification required: Yes  I certify the need for these services furnished under this plan of treatment and while under my care.    X___________________________________________________ Date____________________    Certification From: 2/15/2024  To:5/15/2024

## 2024-02-19 ENCOUNTER — OFFICE VISIT (OUTPATIENT)
Dept: PHYSICAL THERAPY | Age: 59
End: 2024-02-19
Attending: PHYSICIAN ASSISTANT
Payer: COMMERCIAL

## 2024-02-19 PROCEDURE — 97112 NEUROMUSCULAR REEDUCATION: CPT

## 2024-02-19 PROCEDURE — 97110 THERAPEUTIC EXERCISES: CPT

## 2024-02-19 NOTE — PROGRESS NOTES
Diagnosis:   Chronic left-sided low back pain with left-sided sciatica (M54.42,G89.29)          Referring Provider: Johnathanhiantwon  Date of Evaluation:    1/24/2024    Precautions:  None Next MD visit:   none scheduled  Date of Surgery: n/a   Insurance Primary/Secondary: [employer] [place] P[place] / N/A     # Auth Visits: 10 auth   until 4/24/24       Subjective: no new problems. States that her lower back is feeling better. Has mild central LBP pre-rx. Right buttock pain is on/off and better. Not feeling it today. Been working on regular household chores and not aggravating her back pain.     Pain: 2/10 low back       Objective:   lumbar AROM: (* denotes performed with pain)  Flexion: 65 deg*  Extension: 30 deg *  Sidebending: R 10 deg*; L 15 deg  Rotation: R 50%; L 25%    Strength: (* denotes performed with pain)  LE   Hip flexion (L2): R 5/5; L 5/5  Hip abduction: R 4/5; L 4/5  Hip Extension: R 4/5; L 4/5            Hip ER: R 4/5; L 4/5  Hip IR: R 4/5; L 4/5  Knee Flexion: R 5/5; L 5/5            Knee extension (L3): R 5/5; L 5/5            DF (L4): R 5/5; L 5/5  Great Toe Ext (L5): R 5/5, L 5/5  PF (S1): R 5/5; L 5/5      Flexibility:   LE   Hip Flexor: RWNL, L WNL  Hamstrings: R 90 deg; L 80 deg  Piriformis: R WNL; L WNL       Assessment: completed todays treatment without c/o increased pain or discomfort following treatment. Good tolerance for increased resistance for strength progressions today. Making very good progress with back pain and LE symptoms continue to centralize. Good tolerance for GR 4 LP mobilizations today and denies pain post treatment.      Goals:   to be met in 10 visits)   Pt will improve transversus abdominis recruitment to perform proper isometric contraction without requiring verbal or tactile cuing to promote advancement of therex   Pt will demonstrate good understanding of proper posture and body mechanics to decrease pain and improve spinal safety   Pt will improve lumbar spine AROM flexion to >80 deg to  allow increase ease with bending forward to don shoes   Pt will report improved symptom centralization and absence of radicular symptoms for 3 consecutive days to improve function with ADL   Pt will have decreased paraspinal mm tension to tolerate standing >45 minutes for home activities with minimal LBP  Pt will be independent and compliant with comprehensive HEP to maintain progress achieved in PT     Plan: continue POC. Progress as tolerated.   Date: 1/31/2024  TX#: 2/10 Date: 2/12/2024               TX#: 3/10 Date:  2/14/2024               TX#: 4/10 Date: 2/19/2024               TX#: 5/10 Date:   Tx#: 6/   NU step L2 x6 min Nu step L2 x6 min NU step L3 x6 min Nu step L3 x6 min    RTB lateral and monster band walking 35ft x 2 RTB lateral and monster band walking 35ft x 2 GTB lateral and monster band walking 35ft x 2 GTB lateral and monster band walking 35ft x 2    Reformer   4 bands double knee ext 3x20 reps  R/L SL 2 bands 2x10 reps Reformer   4 bands double knee ext 3x20 reps  R/L SL 2 bands 2x10 reps Reformer   5 bands double knee ext 3x20 reps  R/L SL 3 bands 2x10 reps Reformer   5 bands double knee ext 3x20 reps  R/L SL 4 bands 2x10 reps    R/L LE lead ASU 4\" step x10 ea R/L LE lead ASU 6\" step x10 ea  --    Modified downward dog 3x 10 sec Modified downward dog 3x 10 sec Modified downward dog 3x 10 sec --    Hkly: pelvic tilt 10x10 sec  Hkly: LTR x10 ea  Hkly: bridges 10x 5 sec  Supine: SKTC 5 x30 sec  R/L s/l clamshells x10 ea Hkly: pelvic tilt 10x10 sec  Hkly: LTR x10 ea  Hkly: bridges 12x 5 sec  Supine: SKTC 5 x30 sec  R/L s/l clamshells x10 ea Hkly: pelvic tilt 10x10 sec  Hkly: LTR x10 ea  Hkly: bridges 15x 5 sec  Supine: SKTC 5 x30 sec  R/L s/l clamshells x10 ea  R/L s/l hip abd x10 ea Hkly: pelvic tilt 10x10 sec  Hkly: LTR x10 ea  Hkly: bridges 15x 5 sec  Supine: SKTC 5 x15 sec  R/L s/l clamshells YTB x10 ea  R/L s/l hip abd YTB x10 ea    Hkly: bilat hip abd RTB 2x10 Hkly: bilat hip abd RTB 2x10       L/R s/l: LP rotation mobilization Gr 3 5 x10 sec botus L/R s/l: LP rotation mobilization Gr 3 5 x10 sec bouts L/R s/l: LP rotation mobilization Gr 3 5 x10 sec bouts L/R s/l: LP rotation mobilization Gr 4 5 x10 sec bouts                         HEP: Access Code: [email or web address]  URL: [email or web address]  Date: 01/24/2024  Prepared by: Bhakti Baron     Exercises  - Supine Posterior Pelvic Tilt  - 3 x daily - 7 x weekly - 1 sets - 10 reps - 10 sec hold  - Supine Lower Trunk Rotation  - 3 x daily - 7 x weekly - 1 sets - 10 reps - 3 sec hold  - Supine Bridge  - 3 x daily - 7 x weekly - 1 sets - 10 reps - 5 sec hold  - Supine Single Knee to Chest Stretch  - 3 x daily - 7 x weekly - 1 sets - 5 reps - 30 sec hold  - Clamshell  - 3 x daily - 7 x weekly - 1 sets - 10 reps - 3 sec hold    Charges: Ex 2 (35) Nreed 1 (10)       Total Timed Treatment: 45 min  Total Treatment Time: 45 min

## 2024-02-21 ENCOUNTER — APPOINTMENT (OUTPATIENT)
Dept: PHYSICAL THERAPY | Age: 59
End: 2024-02-21
Attending: PHYSICIAN ASSISTANT
Payer: COMMERCIAL

## 2024-03-05 ENCOUNTER — OFFICE VISIT (OUTPATIENT)
Dept: PHYSICAL THERAPY | Age: 59
End: 2024-03-05
Attending: PHYSICIAN ASSISTANT
Payer: COMMERCIAL

## 2024-03-05 PROCEDURE — 97140 MANUAL THERAPY 1/> REGIONS: CPT

## 2024-03-05 PROCEDURE — 97110 THERAPEUTIC EXERCISES: CPT

## 2024-03-05 NOTE — PROGRESS NOTES
Diagnosis:   Chronic left-sided low back pain with left-sided sciatica (M54.42,G89.29)          Referring Provider: Iveth  Date of Evaluation:    1/24/2024    Precautions:  None Next MD visit:   none scheduled  Date of Surgery: n/a   Insurance Primary/Secondary: [employer] [place] P[place] / N/A     # Auth Visits: 10 auth   until 4/24/24     ** insurance agreed to combine neck nad low back treatment so today we will combine treatments** today will be visit 7/10 since cervical eval will count as a session  Subjective: no new problems. States that she is working on her neck exercises and they make her feel a little sore. Working on her HEP 2x/day. Today she feels 3-4/10 pain in her neck and lower back.  Still has pain into the arms with right arm symptoms worse than left.    Pain: 3-4/10 low back, neck        Objective:   lumbar AROM: (* denotes performed with pain)  Flexion: 65 deg*  Extension: 30 deg *  Sidebending: R 10 deg*; L 15 deg  Rotation: R 50%; L 25%    cervical AROM: (* denotes performed with pain)  Flexion: 50 deg  Extension: 60 deg*  Sidebending: R 28 deg* pain on L; L 25 deg* pain on R  Rotation: R 63 deg*; L 70 deg    Strength: (* denotes performed with pain)  LE Shoulder/scapular   Hip flexion (L2): R 5/5; L 5/5  Hip abduction: R 4/5; L 4/5  Hip Extension: R 4/5; L 4/5            Hip ER: R 4/5; L 4/5  Hip IR: R 4/5; L 4/5  Knee Flexion: R 5/5; L 5/5            Knee extension (L3): R 5/5; L 5/5            DF (L4): R 5/5; L 5/5  Great Toe Ext (L5): R 5/5, L 5/5  PF (S1): R 5/5; L 5/5 Shoulder Flex: R 5/5*, L 5/5*  Shoulder ABD (C5): R 4+/5*, L 4+/5*  Biceps (C6): R 5/5, L 5/5  Wrist ext (C6): R 5/5, L 5/5  Triceps (C7): R 5/5, L 5/5  Wrist Flex (C7): R 5/5, L 5/5  Digit Flex (C8): R 5/5, L 5/5  Thumb Ext (C8): R 5/5, L 5/5  Interossei (T1): R 5/5, L 5/5     Rhomboids: R 4/5, L 4/5  Mid trap: R 4/5; L 4/5      Flexibility:   LE   Hip Flexor: RWNL, L WNL  Hamstrings: R 90 deg; L 80 deg  Piriformis: R WNL; L WNL        Assessment: completed todays treatment without c/o increased pain or discomfort following treatment. Focused more on neck today. Reviewed HEP and pt required cues for wrist flexion with radial nerve glides. Pt reported too much pain with cervical side bend during median nerve glides and unable to fully straighten elbow at first with median nerve glides. Advised pt to modify with no neck movement and only keep ROM to minimal pain. She tolerated posture exercises well. Reported improvement in neck pain post treatment.     Goals:   to be met in 10 visits)   Pt will improve transversus abdominis recruitment to perform proper isometric contraction without requiring verbal or tactile cuing to promote advancement of therex   Pt will demonstrate good understanding of proper posture and body mechanics to decrease pain and improve spinal safety   Pt will improve lumbar spine AROM flexion to >80 deg to allow increase ease with bending forward to don shoes   Pt will report improved symptom centralization and absence of radicular symptoms for 3 consecutive days to improve function with ADL   Pt will have decreased paraspinal mm tension to tolerate standing >45 minutes for home activities with minimal LBP  Pt will be independent and compliant with comprehensive HEP to maintain progress achieved in PT   Pt will improve cervical AROM rotation to >70 degrees to improve tolerance for turning head to check blind spot while driving  Pt will have improved thoracic PA mobility to WNL to improve cervical ROM as well as promote upright posturing and decreased pain with lifting > gallon of milk   Improve bilat shoulder AROM to minimal pain/ no pain all planes for self care and ADLS  Pt will improve postural strength (mid/low trap) to 4+/5 to promote improved upright posturing and decreased pain with carrying groceries   Centralize R/L UE symptoms  Pt will be independent and compliant with comprehensive HEP to maintain progress  achieved in PT     Plan: continue POC. Progress as tolerated.   Date:  2/14/2024               TX#: 4/10 Date: 2/19/2024               TX#: 5/10 Date: 3/5/2024  Tx#: 7/10   NU step L3 x6 min Nu step L3 x6 min UBE retro x5 min   GTB lateral and monster band walking 35ft x 2 GTB lateral and monster band walking 35ft x 2 Doorway pectoral stretch 3x10 sec  Wall flexion slides x10  RTB scapular rows x15  Radial nerve glides x10  Median nerve glides [id number]--[id number] range and no neck movement  Self thoracic ext mobilization x10   Reformer   5 bands double knee ext 3x20 reps  R/L SL 3 bands 2x10 reps Reformer   5 bands double knee ext 3x20 reps  R/L SL 4 bands 2x10 reps Wall press ups x10  Cervical distraction, PROM and STM x10 min    -- R sh PROM all planes x3 min   Modified downward dog 3x 10 sec --    Hkly: pelvic tilt 10x10 sec  Hkly: LTR x10 ea  Hkly: bridges 15x 5 sec  Supine: SKTC 5 x30 sec  R/L s/l clamshells x10 ea  R/L s/l hip abd x10 ea Hkly: pelvic tilt 10x10 sec  Hkly: LTR x10 ea  Hkly: bridges 15x 5 sec  Supine: SKTC 5 x15 sec  R/L s/l clamshells YTB x10 ea  R/L s/l hip abd YTB x10 ea Hkly: pelvic tilt 10x10 sec  Hkly: LTR x10 ea  Supine: SKTC 5 x10 sec  R/L s/l clamshells YTB x10 ea        L/R s/l: LP rotation mobilization Gr 3 5 x10 sec bouts L/R s/l: LP rotation mobilization Gr 4 5 x10 sec bouts                   HEP: Access Code: [email or web address]  URL: [email or web address]  Date: 01/24/2024  Prepared by: Bhakti Baron     Exercises  - Supine Posterior Pelvic Tilt  - 3 x daily - 7 x weekly - 1 sets - 10 reps - 10 sec hold  - Supine Lower Trunk Rotation  - 3 x daily - 7 x weekly - 1 sets - 10 reps - 3 sec hold  - Supine Bridge  - 3 x daily - 7 x weekly - 1 sets - 10 reps - 5 sec hold  - Supine Single Knee to Chest Stretch  - 3 x daily - 7 x weekly - 1 sets - 5 reps - 30 sec hold  - Clamshell  - 3 x daily - 7 x weekly - 1 sets - 10 reps - 3 sec hold    Access Code: [email or web address]  URL:  [email or web address]  Date: 02/15/2024  Prepared by: Bhakti Baron     Exercises  - Standing shoulder flexion wall slides  - 3 x daily - 7 x weekly - 1 sets - 10 reps - 2 sec hold  - Doorway Pec Stretch at 60 Elevation  - 3 x daily - 7 x weekly - 1 sets - 3 reps - 10 sec hold  - Standing Shoulder Row with Anchored Resistance  - 3 x daily - 7 x weekly - 1 sets - 15 reps - 3 sec hold  - Thoracic Extension Mobilization on Foam Roll  - 1 x daily - 7 x weekly - 3 sets - 10 reps  - Radial Nerve Flossing  - 3 x daily - 7 x weekly - 1 sets - 10 reps  - Median Nerve Flossing - Tray  - 3 x daily - 7 x weekly - 1 sets - 10 reps       Charges: Ex 2 (35) MT 1(10)       Total Timed Treatment: 45 min  Total Treatment Time: 45 min

## 2024-03-07 ENCOUNTER — OFFICE VISIT (OUTPATIENT)
Dept: PHYSICAL THERAPY | Age: 59
End: 2024-03-07
Attending: PHYSICIAN ASSISTANT
Payer: COMMERCIAL

## 2024-03-07 PROCEDURE — 97140 MANUAL THERAPY 1/> REGIONS: CPT

## 2024-03-07 PROCEDURE — 97110 THERAPEUTIC EXERCISES: CPT

## 2024-03-07 NOTE — PROGRESS NOTES
Diagnosis:   Chronic left-sided low back pain with left-sided sciatica (M54.42,G89.29)          Referring Provider: Iveth  Date of Evaluation:    1/24/2024    Precautions:  None Next MD visit:   none scheduled  Date of Surgery: n/a   Insurance Primary/Secondary: [employer] / N/A     # Auth Visits: 10 auth   until 4/24/24     ** insurance agreed to combine neck nad low back treatment so today we will combine treatments**     Subjective: no new problems. States she still has some soreness with neck/posture HEP exercises. Complains of anterior knee pain with stairs and sit>stand. Lower back and neck is feeling better as compared to last session. Still has pain in the UEs R>L. Modifying the median nerve sliders to avoid neck movement which makes exercise pain free.     Pain: 2/10 low back and neck        Objective:   lumbar AROM: (* denotes performed with pain)  Flexion: 65 deg*  Extension: 30 deg *  Sidebending: R 10 deg*; L 15 deg  Rotation: R 50%; L 25%    cervical AROM: (* denotes performed with pain)  Flexion: 50 deg  Extension: 60 deg*  Sidebending: R 28 deg* pain on L; L 25 deg* pain on R  Rotation: R 63 deg*; L 70 deg    Strength: (* denotes performed with pain)  LE Shoulder/scapular   Hip flexion (L2): R 5/5; L 5/5  Hip abduction: R 4/5; L 4/5  Hip Extension: R 4/5; L 4/5            Hip ER: R 4/5; L 4/5  Hip IR: R 4/5; L 4/5  Knee Flexion: R 5/5; L 5/5            Knee extension (L3): R 5/5; L 5/5            DF (L4): R 5/5; L 5/5  Great Toe Ext (L5): R 5/5, L 5/5  PF (S1): R 5/5; L 5/5 Shoulder Flex: R 5/5*, L 5/5*  Shoulder ABD (C5): R 4+/5*, L 4+/5*  Biceps (C6): R 5/5, L 5/5  Wrist ext (C6): R 5/5, L 5/5  Triceps (C7): R 5/5, L 5/5  Wrist Flex (C7): R 5/5, L 5/5  Digit Flex (C8): R 5/5, L 5/5  Thumb Ext (C8): R 5/5, L 5/5  Interossei (T1): R 5/5, L 5/5     Rhomboids: R 4/5, L 4/5  Mid trap: R 4/5; L 4/5      Flexibility:   LE   Hip Flexor: RWNL, L WNL  Hamstrings: R 90 deg; L 80 deg  Piriformis: R WNL; L  WNL       Assessment: completed todays treatment without c/o increased pain or discomfort following treatment. Reviewed HEP for neck/shoulder and modified wall press ups to feet close to wall which pt reported more comfortable and also recommended spilt stance for towel wall slides to decrease force through shoulder joint. Educated on PFPS and likely cause of her knee pain with stairs and sit>stand. Encouraged HEP of band walking, clamshells to decrease pain with stairs.        Goals:   to be met in 10 visits)   Pt will improve transversus abdominis recruitment to perform proper isometric contraction without requiring verbal or tactile cuing to promote advancement of therex   Pt will demonstrate good understanding of proper posture and body mechanics to decrease pain and improve spinal safety   Pt will improve lumbar spine AROM flexion to >80 deg to allow increase ease with bending forward to don shoes   Pt will report improved symptom centralization and absence of radicular symptoms for 3 consecutive days to improve function with ADL   Pt will have decreased paraspinal mm tension to tolerate standing >45 minutes for home activities with minimal LBP  Pt will be independent and compliant with comprehensive HEP to maintain progress achieved in PT   Pt will improve cervical AROM rotation to >70 degrees to improve tolerance for turning head to check blind spot while driving  Pt will have improved thoracic PA mobility to WNL to improve cervical ROM as well as promote upright posturing and decreased pain with lifting > gallon of milk   Improve bilat shoulder AROM to minimal pain/ no pain all planes for self care and ADLS  Pt will improve postural strength (mid/low trap) to 4+/5 to promote improved upright posturing and decreased pain with carrying groceries   Centralize R/L UE symptoms  Pt will be independent and compliant with comprehensive HEP to maintain progress achieved in PT     Plan: continue POC. Progress as  tolerated.   Date:  2/14/2024               TX#: 4/10 Date: 2/19/2024               TX#: 5/10 Date: 3/5/2024  Tx#: 7/10 Date: 3/7/2024  Tx# 8/10   NU step L3 x6 min Nu step L3 x6 min UBE retro x5 min Nu step L3 x6 min   GTB lateral and monster band walking 35ft x 2 GTB lateral and monster band walking 35ft x 2 Doorway pectoral stretch 3x10 sec  Wall flexion slides x10  RTB scapular rows x15  Radial nerve glides x10  Median nerve glides d43--[id number] range and no neck movement  Self thoracic ext mobilization x10 GTB lateral and monster band walking 35ft x 3 ea  Wall push ups with feet close to wall x10  Wall flexion slides x10     Reformer   5 bands double knee ext 3x20 reps  R/L SL 3 bands 2x10 reps Reformer   5 bands double knee ext 3x20 reps  R/L SL 4 bands 2x10 reps Wall press ups x10  Cervical distraction, PROM and STM x10 min Reformer   5 bands double knee ext 3x20 reps  R/L SL 4 bands 2x10 reps    -- R sh PROM all planes x3 min    Modified downward dog 3x 10 sec --     Hkly: pelvic tilt 10x10 sec  Hkly: LTR x10 ea  Hkly: bridges 15x 5 sec  Supine: SKTC 5 x30 sec  R/L s/l clamshells x10 ea  R/L s/l hip abd x10 ea Hkly: pelvic tilt 10x10 sec  Hkly: LTR x10 ea  Hkly: bridges 15x 5 sec  Supine: SKTC 5 x15 sec  R/L s/l clamshells YTB x10 ea  R/L s/l hip abd YTB x10 ea Hkly: pelvic tilt 10x10 sec  Hkly: LTR x10 ea  Supine: SKTC 5 x10 sec  R/L s/l clamshells YTB x10 ea Hkly: pelvic tilt 10x10 sec  Hkly: LTR x10 ea  Supine: SKTC 5 x10 sec           L/R s/l: LP rotation mobilization Gr 3 5 x10 sec bouts L/R s/l: LP rotation mobilization Gr 4 5 x10 sec bouts Cervical spine PROM and distraction x10 min Cervical spine PROM and distraction x10 min                     HEP: Access Code: [email or web address]  URL: [email or web address]  Date: 01/24/2024  Prepared by: Bhakti Baron     Exercises  - Supine Posterior Pelvic Tilt  - 3 x daily - 7 x weekly - 1 sets - 10 reps - 10 sec hold  - Supine Lower Trunk Rotation  - 3 x daily - 7 x  weekly - 1 sets - 10 reps - 3 sec hold  - Supine Bridge  - 3 x daily - 7 x weekly - 1 sets - 10 reps - 5 sec hold  - Supine Single Knee to Chest Stretch  - 3 x daily - 7 x weekly - 1 sets - 5 reps - 30 sec hold  - Clamshell  - 3 x daily - 7 x weekly - 1 sets - 10 reps - 3 sec hold    Access Code: [email or web address]  URL: [email or web address]  Date: 02/15/2024  Prepared by: Bhakti Baron     Exercises  - Standing shoulder flexion wall slides  - 3 x daily - 7 x weekly - 1 sets - 10 reps - 2 sec hold  - Doorway Pec Stretch at 60 Elevation  - 3 x daily - 7 x weekly - 1 sets - 3 reps - 10 sec hold  - Standing Shoulder Row with Anchored Resistance  - 3 x daily - 7 x weekly - 1 sets - 15 reps - 3 sec hold  - Thoracic Extension Mobilization on Foam Roll  - 1 x daily - 7 x weekly - 3 sets - 10 reps  - Radial Nerve Flossing  - 3 x daily - 7 x weekly - 1 sets - 10 reps  - Median Nerve Flossing - Tray  - 3 x daily - 7 x weekly - 1 sets - 10 reps       Charges: Ex 2 (35) MT 1(10)       Total Timed Treatment: 45 min  Total Treatment Time: 45 min

## 2024-03-12 ENCOUNTER — TELEPHONE (OUTPATIENT)
Dept: PHYSICAL THERAPY | Facility: HOSPITAL | Age: 59
End: 2024-03-12

## 2024-03-14 ENCOUNTER — OFFICE VISIT (OUTPATIENT)
Dept: PHYSICAL THERAPY | Age: 59
End: 2024-03-14
Attending: PHYSICIAN ASSISTANT
Payer: COMMERCIAL

## 2024-03-14 PROCEDURE — 97110 THERAPEUTIC EXERCISES: CPT

## 2024-03-14 PROCEDURE — 97140 MANUAL THERAPY 1/> REGIONS: CPT

## 2024-03-14 NOTE — PROGRESS NOTES
Diagnosis:   Chronic left-sided low back pain with left-sided sciatica (M54.42,G89.29)          Referring Provider: Rushiantwon  Date of Evaluation:    1/24/2024    Precautions:  None Next MD visit:   none scheduled  Date of Surgery: n/a   Insurance Primary/Secondary: [employer] / N/A     # Auth Visits: 10 auth   until 4/24/24     ** insurance agreed to combine neck nad low back treatment so today we will combine treatments**     Subjective: no new problems. States she was feeling sick with exercising and doing her Ramadan fast so was not able to come to PT. States that her lower back is feeling better but her neck pain has been a little worse as well as her right elbow.  Has also been feeling a little more pain in her knees.     Pain: 2/10 low back and 3/10 neck.  Right medial elbow: 3/10       Objective:   lumbar AROM: (* denotes performed with pain)  Flexion: 65 deg*  Extension: 30 deg *  Sidebending: R 10 deg*; L 15 deg  Rotation: R 50%; L 25%    cervical AROM: (* denotes performed with pain)  Flexion: 50 deg  Extension: 60 deg*  Sidebending: R 28 deg* pain on L; L 25 deg* pain on R  Rotation: R 63 deg*; L 70 deg    Strength: (* denotes performed with pain)  LE Shoulder/scapular   Hip flexion (L2): R 5/5; L 5/5  Hip abduction: R 4/5; L 4/5  Hip Extension: R 4/5; L 4/5            Hip ER: R 4/5; L 4/5  Hip IR: R 4/5; L 4/5  Knee Flexion: R 5/5; L 5/5            Knee extension (L3): R 5/5; L 5/5            DF (L4): R 5/5; L 5/5  Great Toe Ext (L5): R 5/5, L 5/5  PF (S1): R 5/5; L 5/5 Shoulder Flex: R 5/5*, L 5/5*  Shoulder ABD (C5): R 4+/5*, L 4+/5*  Biceps (C6): R 5/5, L 5/5  Wrist ext (C6): R 5/5, L 5/5  Triceps (C7): R 5/5, L 5/5  Wrist Flex (C7): R 5/5, L 5/5  Digit Flex (C8): R 5/5, L 5/5  Thumb Ext (C8): R 5/5, L 5/5  Interossei (T1): R 5/5, L 5/5     Rhomboids: R 4/5, L 4/5  Mid trap: R 4/5; L 4/5      Flexibility:   LE   Hip Flexor: RWNL, L WNL  Hamstrings: R 90 deg; L 80 deg  Piriformis: R WNL; L WNL        Assessment: completed todays treatment without c/o increased pain or discomfort following treatment. Focused treatment on neck today per pt request . Medial right elbow pain seems consistent with golfers elbow and possibly mild ulnar nerve tension. Added ulnar nerve glides to HEP. Responded well to medial elbow STM as tenderness was moderate on palpation. Good tolerance for posture exercises today.thoracic hypomobility still present. Encouraged self thoracic mobilization at home with foam roller.  Pt reported significant improvement in her neck and elbow pain post treatment    Goals:   to be met in 10 visits)   Pt will improve transversus abdominis recruitment to perform proper isometric contraction without requiring verbal or tactile cuing to promote advancement of therex   Pt will demonstrate good understanding of proper posture and body mechanics to decrease pain and improve spinal safety   Pt will improve lumbar spine AROM flexion to >80 deg to allow increase ease with bending forward to don shoes   Pt will report improved symptom centralization and absence of radicular symptoms for 3 consecutive days to improve function with ADL   Pt will have decreased paraspinal mm tension to tolerate standing >45 minutes for home activities with minimal LBP  Pt will be independent and compliant with comprehensive HEP to maintain progress achieved in PT   Pt will improve cervical AROM rotation to >70 degrees to improve tolerance for turning head to check blind spot while driving  Pt will have improved thoracic PA mobility to WNL to improve cervical ROM as well as promote upright posturing and decreased pain with lifting > gallon of milk   Improve bilat shoulder AROM to minimal pain/ no pain all planes for self care and ADLS  Pt will improve postural strength (mid/low trap) to 4+/5 to promote improved upright posturing and decreased pain with carrying groceries   Centralize R/L UE symptoms  Pt will be independent and  compliant with comprehensive HEP to maintain progress achieved in PT     Plan: continue POC. Progress as tolerated.   Date:  2/14/2024               TX#: 4/10 Date: 2/19/2024               TX#: 5/10 Date: 3/5/2024  Tx#: 7/10 Date: 3/7/2024  Tx# 8/10 Date: 3/14/2024  Tx# 9/10   NU step L3 x6 min Nu step L3 x6 min UBE retro x5 min Nu step L3 x6 min Nu step L3 x5 min   GTB lateral and monster band walking 35ft x 2 GTB lateral and monster band walking 35ft x 2 Doorway pectoral stretch 3x10 sec  Wall flexion slides x10  RTB scapular rows x15  Radial nerve glides x10  Median nerve glides c68--oitnwbz range and no neck movement  Self thoracic ext mobilization x10 GTB lateral and monster band walking 35ft x 3 ea  Wall push ups with feet close to wall x10  Wall flexion slides x10   Doorway pectoral stretch 3x10 sec  RTB scapular rows 2x10  Ulnar nerve glides x10  Wall push ups x10  Standing against 1/2 FR on wall: alt OH flexion,, bilat sh diag V, bilat Sh H add/ER x10 ea  self thoracic ext mobilization x10  STM right medial elbow x5 min     Reformer   5 bands double knee ext 3x20 reps  R/L SL 3 bands 2x10 reps Reformer   5 bands double knee ext 3x20 reps  R/L SL 4 bands 2x10 reps Wall press ups x10  Cervical distraction, PROM and STM x10 min Reformer   5 bands double knee ext 3x20 reps  R/L SL 4 bands 2x10 reps Cervical distraction, PROM and STM x10 min  Prone; thoracic PA and lateral mobilizations GR 3-4 x10 min    -- R sh PROM all planes x3 min     Modified downward dog 3x 10 sec --      Hkly: pelvic tilt 10x10 sec  Hkly: LTR x10 ea  Hkly: bridges 15x 5 sec  Supine: SKTC 5 x30 sec  R/L s/l clamshells x10 ea  R/L s/l hip abd x10 ea Hkly: pelvic tilt 10x10 sec  Hkly: LTR x10 ea  Hkly: bridges 15x 5 sec  Supine: SKTC 5 x15 sec  R/L s/l clamshells YTB x10 ea  R/L s/l hip abd YTB x10 ea Hkly: pelvic tilt 10x10 sec  Hkly: LTR x10 ea  Supine: SKTC 5 x10 sec  R/L s/l clamshells YTB x10 ea Hkly: pelvic tilt 10x10 sec  Hkly: LTR  x10 ea  Supine: SKTC 5 x10 sec             L/R s/l: LP rotation mobilization Gr 3 5 x10 sec bouts L/R s/l: LP rotation mobilization Gr 4 5 x10 sec bouts Cervical spine PROM and distraction x10 min Cervical spine PROM and distraction x10 min                         HEP: Access Code: [email or web address]  URL: [email or web address]  Date: 01/24/2024  Prepared by: Yefriki Baron     Exercises  - Supine Posterior Pelvic Tilt  - 3 x daily - 7 x weekly - 1 sets - 10 reps - 10 sec hold  - Supine Lower Trunk Rotation  - 3 x daily - 7 x weekly - 1 sets - 10 reps - 3 sec hold  - Supine Bridge  - 3 x daily - 7 x weekly - 1 sets - 10 reps - 5 sec hold  - Supine Single Knee to Chest Stretch  - 3 x daily - 7 x weekly - 1 sets - 5 reps - 30 sec hold  - Clamshell  - 3 x daily - 7 x weekly - 1 sets - 10 reps - 3 sec hold    Access Code: [email or web address]  URL: [email or web address]  Date: 02/15/2024  Prepared by: Yefriki Baron     Exercises  - Standing shoulder flexion wall slides  - 3 x daily - 7 x weekly - 1 sets - 10 reps - 2 sec hold  - Doorway Pec Stretch at 60 Elevation  - 3 x daily - 7 x weekly - 1 sets - 3 reps - 10 sec hold  - Standing Shoulder Row with Anchored Resistance  - 3 x daily - 7 x weekly - 1 sets - 15 reps - 3 sec hold  - Thoracic Extension Mobilization on Foam Roll  - 1 x daily - 7 x weekly - 3 sets - 10 reps  - Radial Nerve Flossing  - 3 x daily - 7 x weekly - 1 sets - 10 reps  - Median Nerve Flossing - Tray  - 3 x daily - 7 x weekly - 1 sets - 10 reps       Charges: Ex 2 (25) MT 1(20)       Total Timed Treatment: 45 min  Total Treatment Time: 45 min

## 2024-03-18 ENCOUNTER — TELEPHONE (OUTPATIENT)
Dept: PHYSICAL THERAPY | Facility: HOSPITAL | Age: 59
End: 2024-03-18

## 2024-03-21 ENCOUNTER — OFFICE VISIT (OUTPATIENT)
Dept: PHYSICAL THERAPY | Age: 59
End: 2024-03-21
Attending: PHYSICIAN ASSISTANT
Payer: COMMERCIAL

## 2024-03-21 PROCEDURE — 97140 MANUAL THERAPY 1/> REGIONS: CPT

## 2024-03-21 PROCEDURE — 97110 THERAPEUTIC EXERCISES: CPT

## 2024-03-21 NOTE — PROGRESS NOTES
Diagnosis:   Chronic left-sided low back pain with left-sided sciatica (M54.42,G89.29)          Referring Provider: [facility]  Date of Evaluation:    1/24/2024    Precautions:  None Next MD visit:   none scheduled  Date of Surgery: n/a   Insurance Primary/Secondary: [employer] / N/A     # Auth Visits: 10 auth   until 4/24/24     ** insurance agreed to combine neck nad low back treatment so today we will combine treatments**     Progress Summary  Pt has attended 10 visits in Physical Therapy. Pt reports feeling 70% better with LBP and 80% better with neck pain and function since starting therapy. She is still having her right medial elbow pain which affects her ability to lift.  This does seem consistent with golfers elbow vs referral from cervical spine. Lower LE pain has centralized but she does have concomitant knee pain which is aggravated by stairs mostly. Her cervical and lumbar spine AROM has improved, her hip ER/IR strength has greatly improved but she still has hip abductor weakness present bilaterally. She is making good progress towards goals and will benefit from continued skilled PT to achieve goals still in progress.    Objective:   lumbar AROM: (* denotes performed with pain)  Flexion: 75 deg--tightness in back and hamstrings  Extension: 30 deg   Sidebending: R 25 deg*; L 20 deg  Rotation: R 50%; L 25%    cervical AROM: (* denotes performed with pain)  Flexion: 50 deg  Extension: 65 deg  Sidebending: R 30 deg* pain on L; L 30 deg* pain on R  Rotation: R 68 deg*; L 75 deg    Strength: (* denotes performed with pain)  LE Shoulder/scapular   Hip flexion (L2): R 5/5; L 5/5  Hip abduction: R 4/5; L 4/5  Hip Extension: R 4/5; L 4/5            Hip ER: R 4+/5; L 4+/5  Hip IR: R 4+/5; L 4+/5  Knee Flexion: R 5/5; L 5/5            Knee extension (L3): R 5/5; L 5/5            DF (L4): R 5/5; L 5/5  Great Toe Ext (L5): R 5/5, L 5/5  PF (S1): R 5/5; L 5/5 Shoulder Flex: R 5/5*, L 5/5*  Shoulder ABD (C5): R 4+/5*, L  4+/5*  Biceps (C6): R 5/5, L 5/5  Wrist ext (C6): R 5/5, L 5/5  Triceps (C7): R 5/5, L 5/5  Wrist Flex (C7): R 5/5, L 5/5  Digit Flex (C8): R 5/5, L 5/5  Thumb Ext (C8): R 5/5, L 5/5  Interossei (T1): R 5/5, L 5/5     Rhomboids: R 4/5, L 4/5  Mid trap: R 4/5; L 4/5      Flexibility:   LE   Hip Flexor: RWNL, L WNL  Hamstrings: R 90 deg; L 80 deg  Piriformis: R WNL; L WNL     Goals:   to be met in 10 visits)   Pt will improve transversus abdominis recruitment to perform proper isometric contraction without requiring verbal or tactile cuing to promote advancement of therex. Met    Pt will demonstrate good understanding of proper posture and body mechanics to decrease pain and improve spinal safety. Met   Pt will improve lumbar spine AROM flexion to >80 deg to allow increase ease with bending forward to don shoes. In progress   Pt will report improved symptom centralization and absence of radicular symptoms for 3 consecutive days to improve function with ADL. In Progress   Pt will have decreased paraspinal mm tension to tolerate standing >45 minutes for home activities with minimal LBP, In progress  Pt will be independent and compliant with comprehensive HEP to maintain progress achieved in PT . In Progress for final HEP  Pt will improve cervical AROM rotation to >70 degrees to improve tolerance for turning head to check blind spot while driving. In Progress  Pt will have improved thoracic PA mobility to WNL to improve cervical ROM as well as promote upright posturing and decreased pain with lifting > gallon of milk. In Progress  Improve bilat shoulder AROM to minimal pain/ no pain all planes for self care and ADLS. In Progress  Pt will improve postural strength (mid/low trap) to 4+/5 to promote improved upright posturing and decreased pain with carrying groceries. In Progress   Centralize R/L UE symptoms. In Progress  Pt will be independent and compliant with comprehensive HEP to maintain progress achieved in PT. In  Progress    Rehab Potential: good    Plan: Continue skilled Physical Therapy POC 1-2 x/week or a total of 8 more visits over a 90 day period.        Patient/Family/Caregiver was advised of these findings, precautions, and treatment options and has agreed to actively participate in planning and for this course of care.    Thank you for your referral. If you have any questions, please contact me at Dept: [phone].    Sincerely,  Electronically signed by therapist: Bhakti Baron PT    Physician's certification required: Yes  Please co-sign or sign and return this letter via fax as soon as possible to [phone].   I certify the need for these services furnished under this plan of treatment and while under my care.    X___________________________________________________ Date____________________    Certification From: 3/21/2024  To:6/19/2024        Subjective: no new problems. States that her shoulders feel a little tight today. No pain in neck.  Lower back is a little sore as well as her knees. Overall her LB and neck are feeling better.     Pain: 2/10 low back and 0/10 neck.  Right medial elbow: 3/10       Objective:   See PN      Assessment: See PN  Goals:   See PN  Plan: continue POC with MD/insurance approval Progress as tolerated  Date:  2/14/2024               TX#: 4/10 Date: 2/19/2024               TX#: 5/10 Date: 3/5/2024  Tx#: 7/10 Date: 3/7/2024  Tx# 8/10 Date: 3/14/2024  Tx# 9/10 Date: 3/21/2024  Tx# 10/10   NU step L3 x6 min Nu step L3 x6 min UBE retro x5 min Nu step L3 x6 min Nu step L3 x5 min Nu step L3 x5 min   GTB lateral and monster band walking 35ft x 2 GTB lateral and monster band walking 35ft x 2 Doorway pectoral stretch 3x10 sec  Wall flexion slides x10  RTB scapular rows x15  Radial nerve glides x10  Median nerve glides c32--clhudxu range and no neck movement  Self thoracic ext mobilization x10 GTB lateral and monster band walking 35ft x 3 ea  Wall push ups with feet close to wall x10  Wall  flexion slides x10   Doorway pectoral stretch 3x10 sec  RTB scapular rows 2x10  Ulnar nerve glides x10  Wall push ups x10  Standing against 1/2 FR on wall: alt OH flexion,, bilat sh diag V, bilat Sh H add/ER x10 ea  self thoracic ext mobilization x10  STM right medial elbow x5 min   Pulleys flexion abduction 10 ea  RTB scapular rows 2x10  Doorway pectoral stretch 3x10 sec  Standing against 1/2 FR on wall: alt OH flexion,, bilat sh diag V, bilat Sh H add/ER x10 ea   Reformer   5 bands double knee ext 3x20 reps  R/L SL 3 bands 2x10 reps Reformer   5 bands double knee ext 3x20 reps  R/L SL 4 bands 2x10 reps Wall press ups x10  Cervical distraction, PROM and STM x10 min Reformer   5 bands double knee ext 3x20 reps  R/L SL 4 bands 2x10 reps Cervical distraction, PROM and STM x10 min  Prone; thoracic PA and lateral mobilizations GR 3-4 x10 min Cervical distraction, PROM and STM x10 min    -- R sh PROM all planes x3 min   Re-assess   Modified downward dog 3x 10 sec --       Hkly: pelvic tilt 10x10 sec  Hkly: LTR x10 ea  Hkly: bridges 15x 5 sec  Supine: SKTC 5 x30 sec  R/L s/l clamshells x10 ea  R/L s/l hip abd x10 ea Hkly: pelvic tilt 10x10 sec  Hkly: LTR x10 ea  Hkly: bridges 15x 5 sec  Supine: SKTC 5 x15 sec  R/L s/l clamshells YTB x10 ea  R/L s/l hip abd YTB x10 ea Hkly: pelvic tilt 10x10 sec  Hkly: LTR x10 ea  Supine: SKTC 5 x10 sec  R/L s/l clamshells YTB x10 ea Hkly: pelvic tilt 10x10 sec  Hkly: LTR x10 ea  Supine: SKTC 5 x10 sec               L/R s/l: LP rotation mobilization Gr 3 5 x10 sec bouts L/R s/l: LP rotation mobilization Gr 4 5 x10 sec bouts Cervical spine PROM and distraction x10 min Cervical spine PROM and distraction x10 min                             HEP: Access Code: [email or web address]  URL: [email or web address]  Date: 01/24/2024  Prepared by: Bhakti Baron     Exercises  - Supine Posterior Pelvic Tilt  - 3 x daily - 7 x weekly - 1 sets - 10 reps - 10 sec hold  - Supine Lower Trunk Rotation  - 3 x daily -  7 x weekly - 1 sets - 10 reps - 3 sec hold  - Supine Bridge  - 3 x daily - 7 x weekly - 1 sets - 10 reps - 5 sec hold  - Supine Single Knee to Chest Stretch  - 3 x daily - 7 x weekly - 1 sets - 5 reps - 30 sec hold  - Clamshell  - 3 x daily - 7 x weekly - 1 sets - 10 reps - 3 sec hold    Access Code: [email or web address]  URL: [email or web address]  Date: 02/15/2024  Prepared by: Bhakti Baron     Exercises  - Standing shoulder flexion wall slides  - 3 x daily - 7 x weekly - 1 sets - 10 reps - 2 sec hold  - Doorway Pec Stretch at 60 Elevation  - 3 x daily - 7 x weekly - 1 sets - 3 reps - 10 sec hold  - Standing Shoulder Row with Anchored Resistance  - 3 x daily - 7 x weekly - 1 sets - 15 reps - 3 sec hold  - Thoracic Extension Mobilization on Foam Roll  - 1 x daily - 7 x weekly - 3 sets - 10 reps  - Radial Nerve Flossing  - 3 x daily - 7 x weekly - 1 sets - 10 reps  - Median Nerve Flossing - Tray  - 3 x daily - 7 x weekly - 1 sets - 10 reps       Charges: Ex 2 (35) MT 1(10)       Total Timed Treatment: 45 min  Total Treatment Time: 45 min

## 2024-03-22 ENCOUNTER — TELEPHONE (OUTPATIENT)
Dept: PHYSICAL THERAPY | Facility: HOSPITAL | Age: 59
End: 2024-03-22

## 2024-03-25 ENCOUNTER — APPOINTMENT (OUTPATIENT)
Dept: PHYSICAL THERAPY | Age: 59
End: 2024-03-25
Attending: PHYSICIAN ASSISTANT
Payer: COMMERCIAL

## 2024-03-26 ENCOUNTER — TELEPHONE (OUTPATIENT)
Dept: PHYSICAL THERAPY | Facility: HOSPITAL | Age: 59
End: 2024-03-26

## 2024-03-28 ENCOUNTER — OFFICE VISIT (OUTPATIENT)
Dept: PHYSICAL THERAPY | Age: 59
End: 2024-03-28
Attending: PHYSICIAN ASSISTANT
Payer: COMMERCIAL

## 2024-03-28 PROCEDURE — 97110 THERAPEUTIC EXERCISES: CPT

## 2024-03-28 PROCEDURE — 97140 MANUAL THERAPY 1/> REGIONS: CPT

## 2024-03-28 NOTE — PROGRESS NOTES
Diagnosis:   Chronic left-sided low back pain with left-sided sciatica (M54.42,G89.29)          Referring Provider: Iveth  Date of Evaluation:    1/24/2024    Precautions:  None Next MD visit:   none scheduled  Date of Surgery: n/a   Insurance Primary/Secondary: [employer] / N/A     # Auth Visits: 18 auth   until 5/24/24     ** insurance agreed to combine neck nad low back treatment so today we will combine treatments**         Subjective: no new problems. States that she is a little sore in her neck and lower back today.  Has not been doing any extra work around the house. Is working on HEP daily.   Overall her LB and neck are feeling better.     Pain: 2/10 low back and 3/10 neck.  Right medial elbow: 3/10       Objective:   Lumbar AROM: (* denotes performed with pain)  Flexion: 75 deg--tightness in back and hamstrings  Extension: 30 deg   Sidebending: R 25 deg*; L 20 deg  Rotation: R 50%; L 25%    cervical AROM: (* denotes performed with pain)  Flexion: 50 deg  Extension: 65 deg  Sidebending: R 30 deg* pain on L; L 30 deg* pain on R  Rotation: R 80 deg; L 80deg 3/28/2024    Strength: (* denotes performed with pain)  LE Shoulder/scapular   Hip flexion (L2): R 5/5; L 5/5  Hip abduction: R 4/5; L 4/5  Hip Extension: R 4/5; L 4/5            Hip ER: R 4+/5; L 4+/5  Hip IR: R 4+/5; L 4+/5  Knee Flexion: R 5/5; L 5/5            Knee extension (L3): R 5/5; L 5/5            DF (L4): R 5/5; L 5/5  Great Toe Ext (L5): R 5/5, L 5/5  PF (S1): R 5/5; L 5/5 Shoulder Flex: R 5/5*, L 5/5*  Shoulder ABD (C5): R 4+/5*, L 4+/5*  Biceps (C6): R 5/5, L 5/5  Wrist ext (C6): R 5/5, L 5/5  Triceps (C7): R 5/5, L 5/5  Wrist Flex (C7): R 5/5, L 5/5  Digit Flex (C8): R 5/5, L 5/5  Thumb Ext (C8): R 5/5, L 5/5  Interossei (T1): R 5/5, L 5/5     Rhomboids: R 4/5, L 4/5  Mid trap: R 4/5; L 4/5      Flexibility:   LE   Hip Flexor: RWNL, L WNL  Hamstrings: R 90 deg; L 80 deg  Piriformis: R WNL; L WNL         Assessment: good tolerance for all  exercises and strengthening progressions today. Thoracic hypomobility persists and improves with manual therapy and self mobilizations. Responds well to flexion based exercises for her lower back pain. Cervical spine rotation full and pain free post treatment today post treatment. She is making good progress towards goals. Pain still present but intensity is decreasing.       Goals:   to be met in 10 visits)   Pt will improve transversus abdominis recruitment to perform proper isometric contraction without requiring verbal or tactile cuing to promote advancement of therex. Met    Pt will demonstrate good understanding of proper posture and body mechanics to decrease pain and improve spinal safety. Met   Pt will improve lumbar spine AROM flexion to >80 deg to allow increase ease with bending forward to don shoes. In progress   Pt will report improved symptom centralization and absence of radicular symptoms for 3 consecutive days to improve function with ADL. In Progress   Pt will have decreased paraspinal mm tension to tolerate standing >45 minutes for home activities with minimal LBP, In progress  Pt will be independent and compliant with comprehensive HEP to maintain progress achieved in PT . In Progress for final HEP  Pt will improve cervical AROM rotation to >70 degrees to improve tolerance for turning head to check blind spot while driving. In Progress  Pt will have improved thoracic PA mobility to WNL to improve cervical ROM as well as promote upright posturing and decreased pain with lifting > gallon of milk. In Progress  Improve bilat shoulder AROM to minimal pain/ no pain all planes for self care and ADLS. In Progress  Pt will improve postural strength (mid/low trap) to 4+/5 to promote improved upright posturing and decreased pain with carrying groceries. In Progress   Centralize R/L UE symptoms. In Progress  Pt will be independent and compliant with comprehensive HEP to maintain progress achieved in PT. In  Progress    Plan: continue POC.  Progress as tolerated  Date: 3/5/2024  Tx#: 7/10 Date: 3/7/2024  Tx# 8/10 Date: 3/14/2024  Tx# 9/10 Date: 3/21/2024  Tx# 10/10 Date: 3/28/2024  Tx# 11/18     UBE retro x5 min Nu step L3 x6 min Nu step L3 x5 min Nu step L3 x5 min NUstep L3 x5 min   Doorway pectoral stretch 3x10 sec  Wall flexion slides x10  RTB scapular rows x15  Radial nerve glides x10  Median nerve glides w11--etznlom range and no neck movement  Self thoracic ext mobilization x10 GTB lateral and monster band walking 35ft x 3 ea  Wall push ups with feet close to wall x10  Wall flexion slides x10   Doorway pectoral stretch 3x10 sec  RTB scapular rows 2x10  Ulnar nerve glides x10  Wall push ups x10  Standing against 1/2 FR on wall: alt OH flexion,, bilat sh diag V, bilat Sh H add/ER x10 ea  self thoracic ext mobilization x10  STM right medial elbow x5 min   Pulleys flexion abduction 10 ea  RTB scapular rows 2x10  Doorway pectoral stretch 3x10 sec  Standing against 1/2 FR on wall: alt OH flexion,, bilat sh diag V, bilat Sh H add/ER x10 ea GTB scapular rows 2x10  Doorway pectoral stretch 3x10 sec  Standing against 1/2 FR on wall: alt OH flexion,, bilat sh diag V, bilat Sh H add/ER x10 ea  Wall push ups x15  Bilat sh ER in neutral x10  Self thoracic ext mobilization x10   Wall press ups x10  Cervical distraction, PROM and STM x10 min Reformer   5 bands double knee ext 3x20 reps  R/L SL 4 bands 2x10 reps Cervical distraction, PROM and STM x10 min  Prone; thoracic PA and lateral mobilizations GR 3-4 x10 min Cervical distraction, PROM and STM x10 min Cervical distraction, PROM and STM x10 min  Prone; thoracic PA and lateral mobilizations GR 3-4 x10 min   R sh PROM all planes x3 min   Re-assess Prone rows x10          Hkly: pelvic tilt 10x10 sec  Hkly: LTR x10 ea  Supine: SKTC 5 x10 sec  R/L s/l clamshells YTB x10 ea Hkly: pelvic tilt 10x10 sec  Hkly: LTR x10 ea  Supine: SKTC 5 x10 sec     Hkly: pelvic tilt 10x10 sec  Hkly:  LTR x10 ea  Supine: SKTC 5 x10 sec  Hkly: bilat hip abd RTB 2x10          Cervical spine PROM and distraction x10 min Cervical spine PROM and distraction x10 min                           HEP: Access Code: [email or web address]  URL: [email or web address]  Date: 01/24/2024  Prepared by: Bhakti Díazel     Exercises  - Supine Posterior Pelvic Tilt  - 3 x daily - 7 x weekly - 1 sets - 10 reps - 10 sec hold  - Supine Lower Trunk Rotation  - 3 x daily - 7 x weekly - 1 sets - 10 reps - 3 sec hold  - Supine Bridge  - 3 x daily - 7 x weekly - 1 sets - 10 reps - 5 sec hold  - Supine Single Knee to Chest Stretch  - 3 x daily - 7 x weekly - 1 sets - 5 reps - 30 sec hold  - Clamshell  - 3 x daily - 7 x weekly - 1 sets - 10 reps - 3 sec hold    Access Code: [email or web address]  URL: [email or web address]  Date: 02/15/2024  Prepared by: Bhakti Baron     Exercises  - Standing shoulder flexion wall slides  - 3 x daily - 7 x weekly - 1 sets - 10 reps - 2 sec hold  - Doorway Pec Stretch at 60 Elevation  - 3 x daily - 7 x weekly - 1 sets - 3 reps - 10 sec hold  - Standing Shoulder Row with Anchored Resistance  - 3 x daily - 7 x weekly - 1 sets - 15 reps - 3 sec hold  - Thoracic Extension Mobilization on Foam Roll  - 1 x daily - 7 x weekly - 3 sets - 10 reps  - Radial Nerve Flossing  - 3 x daily - 7 x weekly - 1 sets - 10 reps  - Median Nerve Flossing - Tray  - 3 x daily - 7 x weekly - 1 sets - 10 reps       Charges: Ex 2 (35) MT 1(10)       Total Timed Treatment: 45 min  Total Treatment Time: 45 min

## 2024-04-01 ENCOUNTER — APPOINTMENT (OUTPATIENT)
Dept: PHYSICAL THERAPY | Age: 59
End: 2024-04-01
Attending: PHYSICIAN ASSISTANT
Payer: COMMERCIAL

## 2024-04-16 ENCOUNTER — OFFICE VISIT (OUTPATIENT)
Dept: PHYSICAL THERAPY | Age: 59
End: 2024-04-16
Attending: PHYSICIAN ASSISTANT
Payer: COMMERCIAL

## 2024-04-16 PROCEDURE — 97140 MANUAL THERAPY 1/> REGIONS: CPT

## 2024-04-16 PROCEDURE — 97110 THERAPEUTIC EXERCISES: CPT

## 2024-04-16 NOTE — PROGRESS NOTES
Diagnosis:   Chronic left-sided low back pain with left-sided sciatica (M54.42,G89.29)          Referring Provider: [facility]  Date of Evaluation:    1/24/2024    Precautions:  None Next MD visit:   none scheduled  Date of Surgery: n/a   Insurance Primary/Secondary: [employer] / N/A     # Auth Visits: 18 auth   until 5/24/24     ** insurance agreed to combine neck nad low back treatment so today we will combine treatments**         Subjective: no new problems. Stats she missed some PT session due to fasting for Ramadan. Her lower back is feeling better and not having much pain in lower back anymore. Neck and shoulders are still sore. Was not able to work on HEP during fast due to feeling weakness.      Pain: 0/10 low back and 3/10 neck.  Right medial elbow: 3/10       Objective:   Lumbar AROM: (* denotes performed with pain)  Flexion: 75 deg--tightness in back and hamstrings  Extension: 30 deg   Sidebending: R 25 deg*; L 20 deg  Rotation: R 50%; L 25%    cervical AROM: (* denotes performed with pain)  Flexion: 50 deg  Extension: 65 deg  Sidebending: R 30 deg* pain on L; L 30 deg* pain on R  Rotation: R 80 deg; L 80deg 3/28/2024    Strength: (* denotes performed with pain)  LE Shoulder/scapular   Hip flexion (L2): R 5/5; L 5/5  Hip abduction: R 4/5; L 4/5  Hip Extension: R 4/5; L 4/5            Hip ER: R 4+/5; L 4+/5  Hip IR: R 4+/5; L 4+/5  Knee Flexion: R 5/5; L 5/5            Knee extension (L3): R 5/5; L 5/5            DF (L4): R 5/5; L 5/5  Great Toe Ext (L5): R 5/5, L 5/5  PF (S1): R 5/5; L 5/5 Shoulder Flex: R 5/5*, L 5/5*  Shoulder ABD (C5): R 4+/5*, L 4+/5*  Biceps (C6): R 5/5, L 5/5  Wrist ext (C6): R 5/5, L 5/5  Triceps (C7): R 5/5, L 5/5  Wrist Flex (C7): R 5/5, L 5/5  Digit Flex (C8): R 5/5, L 5/5  Thumb Ext (C8): R 5/5, L 5/5  Interossei (T1): R 5/5, L 5/5     Rhomboids: R 4/5, L 4/5  Mid trap: R 4/5; L 4/5      Flexibility:   LE   Hip Flexor: RWNL, L WNL  Hamstrings: R 90 deg; L 80 deg  Piriformis: R  WNL; L WNL         Assessment: good tolerance for all exercises and strengthening progressions today. Thoracic mobility is improving with only mild-moderate restrictions noted during mobilizations. Posture awareness also improving. Still has considerable scapular weakness with fatigue noted during strengthening. Encouraged pt to get back on track with HEP now that Ramadan is over. Pt states understanding.       Goals:   to be met in 10 visits)   Pt will improve transversus abdominis recruitment to perform proper isometric contraction without requiring verbal or tactile cuing to promote advancement of therex. Met    Pt will demonstrate good understanding of proper posture and body mechanics to decrease pain and improve spinal safety. Met   Pt will improve lumbar spine AROM flexion to >80 deg to allow increase ease with bending forward to don shoes. In progress   Pt will report improved symptom centralization and absence of radicular symptoms for 3 consecutive days to improve function with ADL. In Progress   Pt will have decreased paraspinal mm tension to tolerate standing >45 minutes for home activities with minimal LBP, In progress  Pt will be independent and compliant with comprehensive HEP to maintain progress achieved in PT . In Progress for final HEP  Pt will improve cervical AROM rotation to >70 degrees to improve tolerance for turning head to check blind spot while driving. In Progress  Pt will have improved thoracic PA mobility to WNL to improve cervical ROM as well as promote upright posturing and decreased pain with lifting > gallon of milk. In Progress  Improve bilat shoulder AROM to minimal pain/ no pain all planes for self care and ADLS. In Progress  Pt will improve postural strength (mid/low trap) to 4+/5 to promote improved upright posturing and decreased pain with carrying groceries. In Progress   Centralize R/L UE symptoms. In Progress  Pt will be independent and compliant with comprehensive HEP to  maintain progress achieved in PT. In Progress    Plan: continue POC.  Progress as tolerated  Date: 3/14/2024  Tx# 9/10 Date: 3/21/2024  Tx# 10/10 Date: 3/28/2024  Tx# 11/18   Date: 4/16/2024  Tx# 12/18   Nu step L3 x5 min Nu step L3 x5 min NUstep L3 x5 min UBE x5 min   Doorway pectoral stretch 3x10 sec  RTB scapular rows 2x10  Ulnar nerve glides x10  Wall push ups x10  Standing against 1/2 FR on wall: alt OH flexion,, bilat sh diag V, bilat Sh H add/ER x10 ea  self thoracic ext mobilization x10  STM right medial elbow x5 min   Pulleys flexion abduction 10 ea  RTB scapular rows 2x10  Doorway pectoral stretch 3x10 sec  Standing against 1/2 FR on wall: alt OH flexion,, bilat sh diag V, bilat Sh H add/ER x10 ea GTB scapular rows 2x10  Doorway pectoral stretch 3x10 sec  Standing against 1/2 FR on wall: alt OH flexion,, bilat sh diag V, bilat Sh H add/ER x10 ea  Wall push ups x15  Bilat sh ER in neutral x10  Self thoracic ext mobilization x10 GTB scapular rows 2x10  GTB high rows x20  Doorway pectoral stretch 3x10 sec  Standing against 1/2 FR on wall: alt OH flexion,, bilat sh diag V, bilat Sh H add/ER x10 ea  Wall push ups x15  Bilat sh ER in neutral x10  Self thoracic ext mobilization x10  L/R s/l thoracic rotation (open/close book) x10 ea   Cervical distraction, PROM and STM x10 min  Prone; thoracic PA and lateral mobilizations GR 3-4 x10 min Cervical distraction, PROM and STM x10 min Cervical distraction, PROM and STM x10 min  Prone; thoracic PA and lateral mobilizations GR 3-4 x10 min Cervical distraction, PROM and STM x10 min  Prone; thoracic PA and lateral mobilizations GR 3-4 x10 min    Re-assess Prone rows x10            Hkly: pelvic tilt 10x10 sec  Hkly: LTR x10 ea  Supine: SKTC 5 x10 sec  Hkly: bilat hip abd RTB 2x10                                  HEP: Access Code: [email or web address]  URL: [email or web address]  Date: 01/24/2024  Prepared by: Bhakti Nelson  - Supine Posterior Pelvic Tilt  - 3 x daily  - 7 x weekly - 1 sets - 10 reps - 10 sec hold  - Supine Lower Trunk Rotation  - 3 x daily - 7 x weekly - 1 sets - 10 reps - 3 sec hold  - Supine Bridge  - 3 x daily - 7 x weekly - 1 sets - 10 reps - 5 sec hold  - Supine Single Knee to Chest Stretch  - 3 x daily - 7 x weekly - 1 sets - 5 reps - 30 sec hold  - Clamshell  - 3 x daily - 7 x weekly - 1 sets - 10 reps - 3 sec hold    Access Code: [email or web address]  URL: [email or web address]  Date: 02/15/2024  Prepared by: Bhakti Baron     Exercises  - Standing shoulder flexion wall slides  - 3 x daily - 7 x weekly - 1 sets - 10 reps - 2 sec hold  - Doorway Pec Stretch at 60 Elevation  - 3 x daily - 7 x weekly - 1 sets - 3 reps - 10 sec hold  - Standing Shoulder Row with Anchored Resistance  - 3 x daily - 7 x weekly - 1 sets - 15 reps - 3 sec hold  - Thoracic Extension Mobilization on Foam Roll  - 1 x daily - 7 x weekly - 3 sets - 10 reps  - Radial Nerve Flossing  - 3 x daily - 7 x weekly - 1 sets - 10 reps  - Median Nerve Flossing - Tray  - 3 x daily - 7 x weekly - 1 sets - 10 reps       Charges: Ex 2 (35) MT 1(10)       Total Timed Treatment: 45 min  Total Treatment Time: 45 min

## 2024-04-29 ENCOUNTER — OFFICE VISIT (OUTPATIENT)
Dept: PHYSICAL THERAPY | Age: 59
End: 2024-04-29
Attending: PHYSICIAN ASSISTANT
Payer: COMMERCIAL

## 2024-04-29 PROCEDURE — 97140 MANUAL THERAPY 1/> REGIONS: CPT

## 2024-04-29 PROCEDURE — 97110 THERAPEUTIC EXERCISES: CPT

## 2024-04-29 NOTE — PROGRESS NOTES
Diagnosis:   Chronic left-sided low back pain with left-sided sciatica (M54.42,G89.29)          Referring Provider: Johnathanhiantwon  Date of Evaluation:    1/24/2024    Precautions:  None Next MD visit:   none scheduled  Date of Surgery: n/a   Insurance Primary/Secondary: [employer] [place] / N/A     # Auth Visits: 18 auth   until 5/24/24     ** insurance agreed to combine neck nad low back treatment so today we will combine treatments**         Subjective: no new problems. States that she is feeling better. The pain in the neck and LB has been mild over the past 2 weeks. The right medial elbow still bothers her with lifting. She has been walking for exercise 1000 steps (10 minutes) daily which she feels no increases in pain.      Pain: 1/10 low back and 1/10 neck.  Right medial elbow: 3/10       Objective:   Lumbar AROM: (* denotes performed with pain)  Flexion: 75 deg--tightness in back and hamstrings  Extension: 30 deg   Sidebending: R 25 deg*; L 20 deg  Rotation: R 50%; L 25%    cervical AROM: (* denotes performed with pain)  Flexion: 50 deg  Extension: 65 deg  Sidebending: R 30 deg* pain on L; L 30 deg* pain on R  Rotation: R 80 deg; L 80deg 3/28/2024    Strength: (* denotes performed with pain)  LE Shoulder/scapular   Hip flexion (L2): R 5/5; L 5/5  Hip abduction: R 4/5; L 4/5  Hip Extension: R 4/5; L 4/5            Hip ER: R 4+/5; L 4+/5  Hip IR: R 4+/5; L 4+/5  Knee Flexion: R 5/5; L 5/5            Knee extension (L3): R 5/5; L 5/5            DF (L4): R 5/5; L 5/5  Great Toe Ext (L5): R 5/5, L 5/5  PF (S1): R 5/5; L 5/5 Shoulder Flex: R 5/5*, L 5/5*  Shoulder ABD (C5): R 4+/5*, L 4+/5*  Biceps (C6): R 5/5, L 5/5  Wrist ext (C6): R 5/5, L 5/5  Triceps (C7): R 5/5, L 5/5  Wrist Flex (C7): R 5/5, L 5/5  Digit Flex (C8): R 5/5, L 5/5  Thumb Ext (C8): R 5/5, L 5/5  Interossei (T1): R 5/5, L 5/5     Rhomboids: R 4/5, L 4/5  Mid trap: R 4/5; L 4/5      Flexibility:   LE   Hip Flexor: RWNL, L WNL  Hamstrings: R 90 deg; L 80  deg  Piriformis: R WNL; L WNL         Assessment: good tolerance for all exercises and strengthening progressions today. Thoracic mobility continues to improve with only mild-moderate restrictions noted during mobilizations. Thoracic rotation full today during thoracic rotation exercise. Making good progress towards goals.     Goals:   to be met in 10 visits)   Pt will improve transversus abdominis recruitment to perform proper isometric contraction without requiring verbal or tactile cuing to promote advancement of therex. Met    Pt will demonstrate good understanding of proper posture and body mechanics to decrease pain and improve spinal safety. Met   Pt will improve lumbar spine AROM flexion to >80 deg to allow increase ease with bending forward to don shoes. In progress   Pt will report improved symptom centralization and absence of radicular symptoms for 3 consecutive days to improve function with ADL. In Progress   Pt will have decreased paraspinal mm tension to tolerate standing >45 minutes for home activities with minimal LBP, In progress  Pt will be independent and compliant with comprehensive HEP to maintain progress achieved in PT . In Progress for final HEP  Pt will improve cervical AROM rotation to >70 degrees to improve tolerance for turning head to check blind spot while driving. In Progress  Pt will have improved thoracic PA mobility to WNL to improve cervical ROM as well as promote upright posturing and decreased pain with lifting > gallon of milk. In Progress  Improve bilat shoulder AROM to minimal pain/ no pain all planes for self care and ADLS. In Progress  Pt will improve postural strength (mid/low trap) to 4+/5 to promote improved upright posturing and decreased pain with carrying groceries. In Progress   Centralize R/L UE symptoms. In Progress  Pt will be independent and compliant with comprehensive HEP to maintain progress achieved in PT. In Progress    Plan: continue POC.  Progress as  tolerated  Date: 3/28/2024  Tx# 11/18   Date: 4/16/2024  Tx# 12/18 Date: 4/29/2024  Tx# 13/18   NUstep L3 x5 min UBE x5 min UBE x5 min   GTB scapular rows 2x10  Doorway pectoral stretch 3x10 sec  Standing against 1/2 FR on wall: alt OH flexion,, bilat sh diag V, bilat Sh H add/ER x10 ea  Wall push ups x15  Bilat sh ER in neutral x10  Self thoracic ext mobilization x10 GTB scapular rows 2x10  GTB high rows x20  Doorway pectoral stretch 3x10 sec  Standing against 1/2 FR on wall: alt OH flexion,, bilat sh diag V, bilat Sh H add/ER x10 ea  Wall push ups x15  Bilat sh ER in neutral x10  Self thoracic ext mobilization x10  L/R s/l thoracic rotation (open/close book) x10 ea GTB scapular rows x25  GTB high rows x25  Doorway pectoral stretch 3x10 sec  Standing against 1/2 FR on wall: alt OH flexion,, bilat sh diag V, bilat Sh H add/ER x10 ea  Wall push ups on YSB 2x10  Bilat sh ER in neutral x10  Self thoracic ext mobilization  2x10  L/R s/l thoracic rotation (open/close book) x10 ea  R wrist flexion/extension stretch 3x20 seconds   Cervical distraction, PROM and STM x10 min  Prone; thoracic PA and lateral mobilizations GR 3-4 x10 min Cervical distraction, PROM and STM x10 min  Prone; thoracic PA and lateral mobilizations GR 3-4 x10 min Cervical distraction, PROM and STM x10 min  Prone; thoracic PA and lateral mobilizations GR 3-4 x10 min   Prone rows x10  Prone rows, sh ext,  sh H abd x10 ea        Hkly: pelvic tilt 10x10 sec  Hkly: LTR x10 ea  Supine: SKTC 5 x10 sec  Hkly: bilat hip abd RTB 2x10     HEP: Access Code: [email or web address]  URL: [email or web address]  Date: 01/24/2024  Prepared by: Bhakti Baron     Exercises  - Supine Posterior Pelvic Tilt  - 3 x daily - 7 x weekly - 1 sets - 10 reps - 10 sec hold  - Supine Lower Trunk Rotation  - 3 x daily - 7 x weekly - 1 sets - 10 reps - 3 sec hold  - Supine Bridge  - 3 x daily - 7 x weekly - 1 sets - 10 reps - 5 sec hold  - Supine Single Knee to Chest Stretch  - 3 x daily - 7  x weekly - 1 sets - 5 reps - 30 sec hold  - Clamshell  - 3 x daily - 7 x weekly - 1 sets - 10 reps - 3 sec hold    Access Code: [email or web address]  URL: [email or web address]  Date: 02/15/2024  Prepared by: Bhakti Baron     Exercises  - Standing shoulder flexion wall slides  - 3 x daily - 7 x weekly - 1 sets - 10 reps - 2 sec hold  - Doorway Pec Stretch at 60 Elevation  - 3 x daily - 7 x weekly - 1 sets - 3 reps - 10 sec hold  - Standing Shoulder Row with Anchored Resistance  - 3 x daily - 7 x weekly - 1 sets - 15 reps - 3 sec hold  - Thoracic Extension Mobilization on Foam Roll  - 1 x daily - 7 x weekly - 3 sets - 10 reps  - Radial Nerve Flossing  - 3 x daily - 7 x weekly - 1 sets - 10 reps  - Median Nerve Flossing - Tray  - 3 x daily - 7 x weekly - 1 sets - 10 reps     4/29/2024  Standing Wrist Flexion Stretch  - 3 x daily - 7 x weekly - 1 sets - 3 reps - 20 sec hold  - Standing Wrist Extension Stretch  - 3 x daily - 7 x weekly - 1 sets - 3 reps - 20 sec hold    Charges: Ex 2 (35) MT 1(10)       Total Timed Treatment: 45 min  Total Treatment Time: 45 min

## 2024-05-01 ENCOUNTER — APPOINTMENT (OUTPATIENT)
Dept: PHYSICAL THERAPY | Age: 59
End: 2024-05-01
Attending: PHYSICIAN ASSISTANT
Payer: COMMERCIAL

## 2024-05-06 ENCOUNTER — APPOINTMENT (OUTPATIENT)
Dept: PHYSICAL THERAPY | Age: 59
End: 2024-05-06
Attending: PHYSICIAN ASSISTANT
Payer: COMMERCIAL

## 2024-05-08 ENCOUNTER — OFFICE VISIT (OUTPATIENT)
Dept: PHYSICAL THERAPY | Age: 59
End: 2024-05-08
Attending: PHYSICIAN ASSISTANT
Payer: COMMERCIAL

## 2024-05-08 PROCEDURE — 97110 THERAPEUTIC EXERCISES: CPT

## 2024-05-08 PROCEDURE — 97140 MANUAL THERAPY 1/> REGIONS: CPT

## 2024-05-08 NOTE — PROGRESS NOTES
Diagnosis:   Chronic left-sided low back pain with left-sided sciatica (M54.42,G89.29)          Referring Provider: [facility]  Date of Evaluation:    1/24/2024    Precautions:  None Next MD visit:   none scheduled  Date of Surgery: n/a   Insurance Primary/Secondary: [employer] [place] / N/A     # Auth Visits: 18 auth   until 5/24/24     ** insurance agreed to combine neck nad low back treatment so today we will combine treatments**         Subjective: no new problems. States that she tripped and fell last Tuesday moving some plants outside. She twisted her left ankle and aggravated her knee pain. She rested for a week and didnt do her PT. Today she feels better with her knee and ankle. Her neck pain was aggravated a little bit. Her right elbow is feeling a little better since last session with the stretches. Today she has mild discomfort in her neck, r elbow and LB.    Pain: 2/10 low back and 2-3/10 neck.  Right medial elbow: 3/10       Objective:   Lumbar AROM: (* denotes performed with pain)  Flexion: 75 deg--tightness in back and hamstrings  Extension: 30 deg   Sidebending: R 25 deg*; L 20 deg  Rotation: R 50%; L 25%    cervical AROM: (* denotes performed with pain)  Flexion: 50 deg  Extension: 65 deg  Sidebending: R 30 deg* pain on L; L 30 deg* pain on R  Rotation: R 80 deg; L 80deg 3/28/2024    Strength: (* denotes performed with pain)  LE Shoulder/scapular   Hip flexion (L2): R 5/5; L 5/5  Hip abduction: R 4/5; L 4/5  Hip Extension: R 4/5; L 4/5            Hip ER: R 4+/5; L 4+/5  Hip IR: R 4+/5; L 4+/5  Knee Flexion: R 5/5; L 5/5            Knee extension (L3): R 5/5; L 5/5            DF (L4): R 5/5; L 5/5  Great Toe Ext (L5): R 5/5, L 5/5  PF (S1): R 5/5; L 5/5 Shoulder Flex: R 5/5*, L 5/5*  Shoulder ABD (C5): R 4+/5*, L 4+/5*  Biceps (C6): R 5/5, L 5/5  Wrist ext (C6): R 5/5, L 5/5  Triceps (C7): R 5/5, L 5/5  Wrist Flex (C7): R 5/5, L 5/5  Digit Flex (C8): R 5/5, L 5/5  Thumb Ext (C8): R 5/5, L 5/5  Interossei (T1):  R 5/5, L 5/5     Rhomboids: R 4/5, L 4/5  Mid trap: R 4/5; L 4/5      Flexibility:   LE   Hip Flexor: RWNL, L WNL  Hamstrings: R 90 deg; L 80 deg  Piriformis: R WNL; L WNL         Assessment: good tolerance for all exercises and strengthening progressions today. Thoracic mobility continues to improve with only mild-moderate restrictions noted during mobilizations. Cues for posture required 25% of time. Cervical spine AROM WNL all planes and pain free post treatment.    Goals:   to be met in 10 visits)   Pt will improve transversus abdominis recruitment to perform proper isometric contraction without requiring verbal or tactile cuing to promote advancement of therex. Met    Pt will demonstrate good understanding of proper posture and body mechanics to decrease pain and improve spinal safety. Met   Pt will improve lumbar spine AROM flexion to >80 deg to allow increase ease with bending forward to don shoes. In progress   Pt will report improved symptom centralization and absence of radicular symptoms for 3 consecutive days to improve function with ADL. In Progress   Pt will have decreased paraspinal mm tension to tolerate standing >45 minutes for home activities with minimal LBP, In progress  Pt will be independent and compliant with comprehensive HEP to maintain progress achieved in PT . In Progress for final HEP  Pt will improve cervical AROM rotation to >70 degrees to improve tolerance for turning head to check blind spot while driving. In Progress  Pt will have improved thoracic PA mobility to WNL to improve cervical ROM as well as promote upright posturing and decreased pain with lifting > gallon of milk. In Progress  Improve bilat shoulder AROM to minimal pain/ no pain all planes for self care and ADLS. In Progress  Pt will improve postural strength (mid/low trap) to 4+/5 to promote improved upright posturing and decreased pain with carrying groceries. In Progress   Centralize R/L UE symptoms. In Progress  Pt  will be independent and compliant with comprehensive HEP to maintain progress achieved in PT. In Progress    Plan: continue POC.  Progress as tolerated  Date: 3/28/2024  Tx# 11/18   Date: 4/16/2024  Tx# 12/18 Date: 4/29/2024  Tx# 13/18 Date: 5/8/2024  Tx# 14/18   NUstep L3 x5 min UBE x5 min UBE x5 min UBE x 5min   GTB scapular rows 2x10  Doorway pectoral stretch 3x10 sec  Standing against 1/2 FR on wall: alt OH flexion,, bilat sh diag V, bilat Sh H add/ER x10 ea  Wall push ups x15  Bilat sh ER in neutral x10  Self thoracic ext mobilization x10 GTB scapular rows 2x10  GTB high rows x20  Doorway pectoral stretch 3x10 sec  Standing against 1/2 FR on wall: alt OH flexion,, bilat sh diag V, bilat Sh H add/ER x10 ea  Wall push ups x15  Bilat sh ER in neutral x10  Self thoracic ext mobilization x10  L/R s/l thoracic rotation (open/close book) x10 ea GTB scapular rows x25  GTB high rows x25  Doorway pectoral stretch 3x10 sec  Standing against 1/2 FR on wall: alt OH flexion,, bilat sh diag V, bilat Sh H add/ER x10 ea  Wall push ups on YSB 2x10  Bilat sh ER in neutral x10  Self thoracic ext mobilization  2x10  L/R s/l thoracic rotation (open/close book) x10 ea  R wrist flexion/extension stretch 3x20 seconds Doorway pectoral stretch 3x10 sec  GTB scapular rows x20  GTB high rows x25  Standing against 1/2 FR on wall: alt OH flexion,, bilat sh diag V, bilat Sh H add/ER x10 ea  Wall push ups on YSB 2x10  Bilat sh ER in neutral x10  Self thoracic ext mobilization  2x10   Cervical distraction, PROM and STM x10 min  Prone; thoracic PA and lateral mobilizations GR 3-4 x10 min Cervical distraction, PROM and STM x10 min  Prone; thoracic PA and lateral mobilizations GR 3-4 x10 min Cervical distraction, PROM and STM x10 min  Prone; thoracic PA and lateral mobilizations GR 3-4 x10 min Cervical distraction, PROM and STM x10 min  Prone; thoracic PA and lateral mobilizations GR 3-4 x 5 min   Prone rows x10  Prone rows, sh ext,  sh H abd x10  ea Prone rows, sh ext,  sh H abd x10 ea         Hkly: pelvic tilt 10x10 sec  Hkly: LTR x10 ea  Supine: SKTC 5 x10 sec  Hkly: bilat hip abd RTB 2x10      HEP: Access Code: [email or web address]  URL: [email or web address]  Date: 01/24/2024  Prepared by: Simki Baron     Exercises  - Supine Posterior Pelvic Tilt  - 3 x daily - 7 x weekly - 1 sets - 10 reps - 10 sec hold  - Supine Lower Trunk Rotation  - 3 x daily - 7 x weekly - 1 sets - 10 reps - 3 sec hold  - Supine Bridge  - 3 x daily - 7 x weekly - 1 sets - 10 reps - 5 sec hold  - Supine Single Knee to Chest Stretch  - 3 x daily - 7 x weekly - 1 sets - 5 reps - 30 sec hold  - Clamshell  - 3 x daily - 7 x weekly - 1 sets - 10 reps - 3 sec hold    Access Code: [email or web address]  URL: [email or web address]  Date: 02/15/2024  Prepared by: Simki Baron     Exercises  - Standing shoulder flexion wall slides  - 3 x daily - 7 x weekly - 1 sets - 10 reps - 2 sec hold  - Doorway Pec Stretch at 60 Elevation  - 3 x daily - 7 x weekly - 1 sets - 3 reps - 10 sec hold  - Standing Shoulder Row with Anchored Resistance  - 3 x daily - 7 x weekly - 1 sets - 15 reps - 3 sec hold  - Thoracic Extension Mobilization on Foam Roll  - 1 x daily - 7 x weekly - 3 sets - 10 reps  - Radial Nerve Flossing  - 3 x daily - 7 x weekly - 1 sets - 10 reps  - Median Nerve Flossing - Tray  - 3 x daily - 7 x weekly - 1 sets - 10 reps     4/29/2024  Standing Wrist Flexion Stretch  - 3 x daily - 7 x weekly - 1 sets - 3 reps - 20 sec hold  - Standing Wrist Extension Stretch  - 3 x daily - 7 x weekly - 1 sets - 3 reps - 20 sec hold    Charges: Ex 2 (35) MT 1(10)       Total Timed Treatment: 45 min  Total Treatment Time: 45 min

## 2024-05-16 ENCOUNTER — OFFICE VISIT (OUTPATIENT)
Dept: PHYSICAL THERAPY | Age: 59
End: 2024-05-16
Attending: PHYSICIAN ASSISTANT
Payer: COMMERCIAL

## 2024-05-16 PROCEDURE — 97110 THERAPEUTIC EXERCISES: CPT

## 2024-05-16 PROCEDURE — 97140 MANUAL THERAPY 1/> REGIONS: CPT

## 2024-05-16 NOTE — PROGRESS NOTES
Diagnosis:   Chronic left-sided low back pain with left-sided sciatica (M54.42,G89.29)          Referring Provider: Johnathanhiantwon  Date of Evaluation:    1/24/2024    Precautions:  None Next MD visit:   none scheduled  Date of Surgery: n/a   Insurance Primary/Secondary: [employer] [place] / N/A     # Auth Visits: 18 auth   until 5/24/24     ** insurance agreed to combine neck nad low back treatment so today we will combine treatments**         Subjective: no new problems. States that her lower back pain seems pretty good and hasn't had an issue this past week with it.  Her neck and shoulder discomfort continues to improve but still feels some discomfort. Has been getting her garden ready so doing and her neck doesn't bother her much with that.     Pain: 0/10 low back and 2-3/10 neck.  Right medial elbow: 3/10       Objective:   Lumbar AROM: (* denotes performed with pain)  Flexion: 75 deg--tightness in back and hamstrings  Extension: 30 deg   Sidebending: R 25 deg*; L 20 deg  Rotation: R 50%; L 25%    cervical AROM: (* denotes performed with pain)  Flexion: 50 deg  Extension: 65 deg  Sidebending: R 30 deg* pain on L; L 30 deg* pain on R  Rotation: R 80 deg; L 80deg 3/28/2024    Strength: (* denotes performed with pain)  LE Shoulder/scapular   Hip flexion (L2): R 5/5; L 5/5  Hip abduction: R 4/5; L 4/5  Hip Extension: R 4/5; L 4/5            Hip ER: R 4+/5; L 4+/5  Hip IR: R 4+/5; L 4+/5  Knee Flexion: R 5/5; L 5/5            Knee extension (L3): R 5/5; L 5/5            DF (L4): R 5/5; L 5/5  Great Toe Ext (L5): R 5/5, L 5/5  PF (S1): R 5/5; L 5/5 Shoulder Flex: R 5/5*, L 5/5*  Shoulder ABD (C5): R 4+/5*, L 4+/5*  Biceps (C6): R 5/5, L 5/5  Wrist ext (C6): R 5/5, L 5/5  Triceps (C7): R 5/5, L 5/5  Wrist Flex (C7): R 5/5, L 5/5  Digit Flex (C8): R 5/5, L 5/5  Thumb Ext (C8): R 5/5, L 5/5  Interossei (T1): R 5/5, L 5/5     Rhomboids: R 4/5, L 4/5  Mid trap: R 4/5; L 4/5      Flexibility:   LE   Hip Flexor: RWNL, L WNL  Hamstrings:  R 90 deg; L 80 deg  Piriformis: R WNL; L WNL         Assessment: good tolerance for all exercises and strengthening progressions today. Thoracic mobility continues to improve with only mild-moderate restrictions noted during mobilizations. Posture awareness improving with minimal cues required today.     Goals:   to be met in 10 visits)   Pt will improve transversus abdominis recruitment to perform proper isometric contraction without requiring verbal or tactile cuing to promote advancement of therex. Met    Pt will demonstrate good understanding of proper posture and body mechanics to decrease pain and improve spinal safety. Met   Pt will improve lumbar spine AROM flexion to >80 deg to allow increase ease with bending forward to don shoes. In progress   Pt will report improved symptom centralization and absence of radicular symptoms for 3 consecutive days to improve function with ADL. In Progress   Pt will have decreased paraspinal mm tension to tolerate standing >45 minutes for home activities with minimal LBP, In progress  Pt will be independent and compliant with comprehensive HEP to maintain progress achieved in PT . In Progress for final HEP  Pt will improve cervical AROM rotation to >70 degrees to improve tolerance for turning head to check blind spot while driving. In Progress  Pt will have improved thoracic PA mobility to WNL to improve cervical ROM as well as promote upright posturing and decreased pain with lifting > gallon of milk. In Progress  Improve bilat shoulder AROM to minimal pain/ no pain all planes for self care and ADLS. In Progress  Pt will improve postural strength (mid/low trap) to 4+/5 to promote improved upright posturing and decreased pain with carrying groceries. In Progress   Centralize R/L UE symptoms. In Progress  Pt will be independent and compliant with comprehensive HEP to maintain progress achieved in PT. In Progress    Plan: continue POC. Progress as tolerated  Date:  3/28/2024  Tx# 11/18   Date: 4/16/2024  Tx# 12/18 Date: 4/29/2024  Tx# 13/18 Date: 5/8/2024  Tx# 14/18 Date: 5/16/2024  Tx# 15/18   NUstep L3 x5 min UBE x5 min UBE x5 min UBE x 5min UBE x5 min   GTB scapular rows 2x10  Doorway pectoral stretch 3x10 sec  Standing against 1/2 FR on wall: alt OH flexion,, bilat sh diag V, bilat Sh H add/ER x10 ea  Wall push ups x15  Bilat sh ER in neutral x10  Self thoracic ext mobilization x10 GTB scapular rows 2x10  GTB high rows x20  Doorway pectoral stretch 3x10 sec  Standing against 1/2 FR on wall: alt OH flexion,, bilat sh diag V, bilat Sh H add/ER x10 ea  Wall push ups x15  Bilat sh ER in neutral x10  Self thoracic ext mobilization x10  L/R s/l thoracic rotation (open/close book) x10 ea GTB scapular rows x25  GTB high rows x25  Doorway pectoral stretch 3x10 sec  Standing against 1/2 FR on wall: alt OH flexion,, bilat sh diag V, bilat Sh H add/ER x10 ea  Wall push ups on YSB 2x10  Bilat sh ER in neutral x10  Self thoracic ext mobilization  2x10  L/R s/l thoracic rotation (open/close book) x10 ea  R wrist flexion/extension stretch 3x20 seconds Doorway pectoral stretch 3x10 sec  GTB scapular rows x20  GTB high rows x25  Standing against 1/2 FR on wall: alt OH flexion,, bilat sh diag V, bilat Sh H add/ER x10 ea  Wall push ups on YSB 2x10  Bilat sh ER in neutral x10  Self thoracic ext mobilization  2x10 Doorway pectoral stretch 3x10 sec  GTB scapular rows x25  GTB high rows x20  Standing against 1/2 FR on wall: alt OH flexion,, bilat sh diag V, bilat Sh H add/ER x10 ea  Wall push ups on YSB 2x10  Bilat sh ER in neutral x10  Bilat Sh H abd YTB x10  Self thoracic ext mobilization  2x10     Cervical distraction, PROM and STM x10 min  Prone; thoracic PA and lateral mobilizations GR 3-4 x10 min Cervical distraction, PROM and STM x10 min  Prone; thoracic PA and lateral mobilizations GR 3-4 x10 min Cervical distraction, PROM and STM x10 min  Prone; thoracic PA and lateral mobilizations GR  3-4 x10 min Cervical distraction, PROM and STM x10 min  Prone; thoracic PA and lateral mobilizations GR 3-4 x 5 min Cervical distraction, PROM and STM x10 min  Prone; thoracic PA and lateral mobilizations GR 3-4 x 5 min   Prone rows x10  Prone rows, sh ext,  sh H abd x10 ea Prone rows, sh ext,  sh H abd x10 ea           Hkly: pelvic tilt 10x10 sec  Hkly: LTR x10 ea  Supine: SKTC 5 x10 sec  Hkly: bilat hip abd RTB 2x10       HEP: Access Code: [email or web address]  URL: [email or web address]  Date: 01/24/2024  Prepared by: Bhakti Baron     Exercises  - Supine Posterior Pelvic Tilt  - 3 x daily - 7 x weekly - 1 sets - 10 reps - 10 sec hold  - Supine Lower Trunk Rotation  - 3 x daily - 7 x weekly - 1 sets - 10 reps - 3 sec hold  - Supine Bridge  - 3 x daily - 7 x weekly - 1 sets - 10 reps - 5 sec hold  - Supine Single Knee to Chest Stretch  - 3 x daily - 7 x weekly - 1 sets - 5 reps - 30 sec hold  - Clamshell  - 3 x daily - 7 x weekly - 1 sets - 10 reps - 3 sec hold    Access Code: [email or web address]  URL: [email or web address]  Date: 02/15/2024  Prepared by: Bhakti Díazel     Exercises  - Standing shoulder flexion wall slides  - 3 x daily - 7 x weekly - 1 sets - 10 reps - 2 sec hold  - Doorway Pec Stretch at 60 Elevation  - 3 x daily - 7 x weekly - 1 sets - 3 reps - 10 sec hold  - Standing Shoulder Row with Anchored Resistance  - 3 x daily - 7 x weekly - 1 sets - 15 reps - 3 sec hold  - Thoracic Extension Mobilization on Foam Roll  - 1 x daily - 7 x weekly - 3 sets - 10 reps  - Radial Nerve Flossing  - 3 x daily - 7 x weekly - 1 sets - 10 reps  - Median Nerve Flossing - Tray  - 3 x daily - 7 x weekly - 1 sets - 10 reps     4/29/2024  Standing Wrist Flexion Stretch  - 3 x daily - 7 x weekly - 1 sets - 3 reps - 20 sec hold  - Standing Wrist Extension Stretch  - 3 x daily - 7 x weekly - 1 sets - 3 reps - 20 sec hold    Charges: Ex 2 (35) MT 1(10)       Total Timed Treatment: 45 min  Total Treatment Time: 45 min

## 2024-05-21 ENCOUNTER — OFFICE VISIT (OUTPATIENT)
Dept: PHYSICAL THERAPY | Age: 59
End: 2024-05-21
Attending: PHYSICIAN ASSISTANT
Payer: COMMERCIAL

## 2024-05-21 PROCEDURE — 97140 MANUAL THERAPY 1/> REGIONS: CPT

## 2024-05-21 PROCEDURE — 97110 THERAPEUTIC EXERCISES: CPT

## 2024-05-21 NOTE — PROGRESS NOTES
Diagnosis:   Chronic left-sided low back pain with left-sided sciatica (M54.42,G89.29)          Referring Provider: Johnathanhiantwon  Date of Evaluation:    1/24/2024    Precautions:  None Next MD visit:   none scheduled  Date of Surgery: n/a   Insurance Primary/Secondary: [employer] [place] / N/A     # Auth Visits: 18 auth   until 5/24/24     ** insurance agreed to combine neck nad low back treatment so today we will combine treatments**         Subjective: no new problems. States that her lower back pain continues to improve and hasnt not felt much pain since last session.  Her neck and left shoulder discomfort continues to improve but still feels some discomfort.  Still feels some right medial elbow discomfort but is able to do more with less pain.     Pain: 0/10 low back and 3/10 neck.  Right medial elbow: 3/10       Objective:   Lumbar AROM: (* denotes performed with pain)  Flexion: 75 deg--tightness in back and hamstrings  Extension: 30 deg   Sidebending: R 25 deg*; L 20 deg  Rotation: R 50%; L 25%    cervical AROM: (* denotes performed with pain)  Flexion: 50 deg  Extension: 65 deg  Sidebending: R 30 deg* pain on L; L 30 deg* pain on R  Rotation: R 90 deg; L 90deg 5/21/2024    Strength: (* denotes performed with pain)  LE Shoulder/scapular   Hip flexion (L2): R 5/5; L 5/5  Hip abduction: R 4/5; L 4/5  Hip Extension: R 4/5; L 4/5            Hip ER: R 4+/5; L 4+/5  Hip IR: R 4+/5; L 4+/5  Knee Flexion: R 5/5; L 5/5            Knee extension (L3): R 5/5; L 5/5            DF (L4): R 5/5; L 5/5  Great Toe Ext (L5): R 5/5, L 5/5  PF (S1): R 5/5; L 5/5 Shoulder Flex: R 5/5*, L 5/5*  Shoulder ABD (C5): R 4+/5*, L 4+/5*  Biceps (C6): R 5/5, L 5/5  Wrist ext (C6): R 5/5, L 5/5  Triceps (C7): R 5/5, L 5/5  Wrist Flex (C7): R 5/5, L 5/5  Digit Flex (C8): R 5/5, L 5/5  Thumb Ext (C8): R 5/5, L 5/5  Interossei (T1): R 5/5, L 5/5     Rhomboids: R 4/5, L 4/5  Mid trap: R 4/5; L 4/5      Flexibility:   LE   Hip Flexor: RWNL, L  WNL  Hamstrings: R 90 deg; L 80 deg  Piriformis: R WNL; L WNL         Assessment: good tolerance for all exercises and strengthening progressions today. Thoracic mobility continues to improve with only mild restrictions noted during mobilizations. Posture awareness improving with minimal cues required today. UT flexibility greatly improved. Making very good progress towards goals. Full cervical rotation post treatment and pain free.     Goals:   to be met in 10 visits)   Pt will improve transversus abdominis recruitment to perform proper isometric contraction without requiring verbal or tactile cuing to promote advancement of therex. Met    Pt will demonstrate good understanding of proper posture and body mechanics to decrease pain and improve spinal safety. Met   Pt will improve lumbar spine AROM flexion to >80 deg to allow increase ease with bending forward to don shoes. In progress   Pt will report improved symptom centralization and absence of radicular symptoms for 3 consecutive days to improve function with ADL. In Progress   Pt will have decreased paraspinal mm tension to tolerate standing >45 minutes for home activities with minimal LBP, In progress  Pt will be independent and compliant with comprehensive HEP to maintain progress achieved in PT . In Progress for final HEP  Pt will improve cervical AROM rotation to >70 degrees to improve tolerance for turning head to check blind spot while driving. In Progress  Pt will have improved thoracic PA mobility to WNL to improve cervical ROM as well as promote upright posturing and decreased pain with lifting > gallon of milk. In Progress  Improve bilat shoulder AROM to minimal pain/ no pain all planes for self care and ADLS. In Progress  Pt will improve postural strength (mid/low trap) to 4+/5 to promote improved upright posturing and decreased pain with carrying groceries. In Progress   Centralize R/L UE symptoms. In Progress  Pt will be independent and compliant  with comprehensive HEP to maintain progress achieved in PT. In Progress    Plan: continue POC. Progress as tolerated  Date: 3/28/2024  Tx# 11/18   Date: 4/16/2024  Tx# 12/18 Date: 4/29/2024  Tx# 13/18 Date: 5/8/2024  Tx# 14/18 Date: 5/16/2024  Tx# 15/18 Date: 5/21/2024  Tx# 16/18   NUstep L3 x5 min UBE x5 min UBE x5 min UBE x 5min UBE x5 min UBE x5 min   GTB scapular rows 2x10  Doorway pectoral stretch 3x10 sec  Standing against 1/2 FR on wall: alt OH flexion,, bilat sh diag V, bilat Sh H add/ER x10 ea  Wall push ups x15  Bilat sh ER in neutral x10  Self thoracic ext mobilization x10 GTB scapular rows 2x10  GTB high rows x20  Doorway pectoral stretch 3x10 sec  Standing against 1/2 FR on wall: alt OH flexion,, bilat sh diag V, bilat Sh H add/ER x10 ea  Wall push ups x15  Bilat sh ER in neutral x10  Self thoracic ext mobilization x10  L/R s/l thoracic rotation (open/close book) x10 ea GTB scapular rows x25  GTB high rows x25  Doorway pectoral stretch 3x10 sec  Standing against 1/2 FR on wall: alt OH flexion,, bilat sh diag V, bilat Sh H add/ER x10 ea  Wall push ups on YSB 2x10  Bilat sh ER in neutral x10  Self thoracic ext mobilization  2x10  L/R s/l thoracic rotation (open/close book) x10 ea  R wrist flexion/extension stretch 3x20 seconds Doorway pectoral stretch 3x10 sec  GTB scapular rows x20  GTB high rows x25  Standing against 1/2 FR on wall: alt OH flexion,, bilat sh diag V, bilat Sh H add/ER x10 ea  Wall push ups on YSB 2x10  Bilat sh ER in neutral x10  Self thoracic ext mobilization  2x10 Doorway pectoral stretch 3x10 sec  GTB scapular rows x25  GTB high rows x20  Standing against 1/2 FR on wall: alt OH flexion,, bilat sh diag V, bilat Sh H add/ER x10 ea  Wall push ups on YSB 2x10  Bilat sh ER in neutral x10  Bilat Sh H abd YTB x10  Self thoracic ext mobilization  2x10   Doorway pectoral stretch 3x10 sec  GTB scapular rows x25  GTB high rows x20  Standing against 1/2 FR on wall: alt OH flexion,, bilat sh diag  V, bilat Sh H add/ER x10 ea  Wall push ups on YSB 2x10  Bilat sh ER in neutral YTBx10  Bilat Sh H abd YTB x10   Cervical distraction, PROM and STM x10 min  Prone; thoracic PA and lateral mobilizations GR 3-4 x10 min Cervical distraction, PROM and STM x10 min  Prone; thoracic PA and lateral mobilizations GR 3-4 x10 min Cervical distraction, PROM and STM x10 min  Prone; thoracic PA and lateral mobilizations GR 3-4 x10 min Cervical distraction, PROM and STM x10 min  Prone; thoracic PA and lateral mobilizations GR 3-4 x 5 min Cervical distraction, PROM and STM x10 min  Prone; thoracic PA and lateral mobilizations GR 3-4 x 5 min Cervical distraction, PROM and STM x10 min  Prone; thoracic PA  GR 3-4 x 5 min   Prone rows x10  Prone rows, sh ext,  sh H abd x10 ea Prone rows, sh ext,  sh H abd x10 ea  IASTM Left UT x5 min           Hkly: pelvic tilt 10x10 sec  Hkly: LTR x10 ea  Supine: SKTC 5 x10 sec  Hkly: bilat hip abd RTB 2x10        HEP: Access Code: [email or web address]  URL: [email or web address]  Date: 01/24/2024  Prepared by: Bhakti Baron     Exercises  - Supine Posterior Pelvic Tilt  - 3 x daily - 7 x weekly - 1 sets - 10 reps - 10 sec hold  - Supine Lower Trunk Rotation  - 3 x daily - 7 x weekly - 1 sets - 10 reps - 3 sec hold  - Supine Bridge  - 3 x daily - 7 x weekly - 1 sets - 10 reps - 5 sec hold  - Supine Single Knee to Chest Stretch  - 3 x daily - 7 x weekly - 1 sets - 5 reps - 30 sec hold  - Clamshell  - 3 x daily - 7 x weekly - 1 sets - 10 reps - 3 sec hold    Access Code: [email or web address]  URL: [email or web address]  Date: 02/15/2024  Prepared by: Bhakti Baron     Exercises  - Standing shoulder flexion wall slides  - 3 x daily - 7 x weekly - 1 sets - 10 reps - 2 sec hold  - Doorway Pec Stretch at 60 Elevation  - 3 x daily - 7 x weekly - 1 sets - 3 reps - 10 sec hold  - Standing Shoulder Row with Anchored Resistance  - 3 x daily - 7 x weekly - 1 sets - 15 reps - 3 sec hold  - Thoracic Extension Mobilization on Foam  Roll  - 1 x daily - 7 x weekly - 3 sets - 10 reps  - Radial Nerve Flossing  - 3 x daily - 7 x weekly - 1 sets - 10 reps  - Median Nerve Flossing - Tray  - 3 x daily - 7 x weekly - 1 sets - 10 reps     4/29/2024  Standing Wrist Flexion Stretch  - 3 x daily - 7 x weekly - 1 sets - 3 reps - 20 sec hold  - Standing Wrist Extension Stretch  - 3 x daily - 7 x weekly - 1 sets - 3 reps - 20 sec hold    Charges: Ex 2 (35) MT 1(10)       Total Timed Treatment: 45 min  Total Treatment Time: 45 min

## 2024-05-28 ENCOUNTER — APPOINTMENT (OUTPATIENT)
Dept: PHYSICAL THERAPY | Age: 59
End: 2024-05-28
Attending: PHYSICIAN ASSISTANT
Payer: COMMERCIAL

## 2024-05-30 ENCOUNTER — OFFICE VISIT (OUTPATIENT)
Dept: PHYSICAL THERAPY | Age: 59
End: 2024-05-30
Attending: PHYSICIAN ASSISTANT
Payer: COMMERCIAL

## 2024-05-30 PROCEDURE — 97110 THERAPEUTIC EXERCISES: CPT

## 2024-05-30 PROCEDURE — 97140 MANUAL THERAPY 1/> REGIONS: CPT

## 2024-05-30 NOTE — PROGRESS NOTES
Diagnosis:   Chronic left-sided low back pain with left-sided sciatica (M54.42,G89.29)          Referring Provider: [facility] [[email or web address]]  Date of Evaluation:    1/24/2024    Precautions:  None Next MD visit:   none scheduled  Date of Surgery: n/a   Insurance Primary/Secondary: [place] / N/A     # Auth Visits: 18 auth   until 5/24/24     ** insurance agreed to combine neck and low back treatment so today we will combine treatments**         Subjective: no new problems. Stats she continues to feel improvement in her neck and LBP.  Feels she slept wrong last night and woke up with the left side of her neck feeling sore.  Has improved since this morning but still lingering.   PT 20 mins late for appt today.     Pain: 2/10 low back and 2/10 neck.  Right medial elbow: 2/10       Objective:   Lumbar AROM: (* denotes performed with pain)  Flexion: 75 deg--tightness in back and hamstrings  Extension: 30 deg   Sidebending: R 25 deg*; L 20 deg  Rotation: R 50%; L 25%    cervical AROM: (* denotes performed with pain)  Flexion: 50 deg  Extension: 65 deg  Sidebending: R 30 deg* pain on L; L 30 deg* pain on R  Rotation: R 90 deg; L 90deg 5/21/2024    Strength: (* denotes performed with pain)  LE Shoulder/scapular   Hip flexion (L2): R 5/5; L 5/5  Hip abduction: R 4/5; L 4/5  Hip Extension: R 4/5; L 4/5            Hip ER: R 4+/5; L 4+/5  Hip IR: R 4+/5; L 4+/5  Knee Flexion: R 5/5; L 5/5            Knee extension (L3): R 5/5; L 5/5            DF (L4): R 5/5; L 5/5  Great Toe Ext (L5): R 5/5, L 5/5  PF (S1): R 5/5; L 5/5 Shoulder Flex: R 5/5*, L 5/5*  Shoulder ABD (C5): R 4+/5*, L 4+/5*  Biceps (C6): R 5/5, L 5/5  Wrist ext (C6): R 5/5, L 5/5  Triceps (C7): R 5/5, L 5/5  Wrist Flex (C7): R 5/5, L 5/5  Digit Flex (C8): R 5/5, L 5/5  Thumb Ext (C8): R 5/5, L 5/5  Interossei (T1): R 5/5, L 5/5     Rhomboids: R 4/5, L 4/5  Mid trap: R 4/5; L 4/5      Flexibility:   LE   Hip Flexor: RWNL, L WNL  Hamstrings: R 90 deg; L 80 deg  Piriformis: R WNL;  L WNL         Assessment: good tolerance for all exercises and strengthening progressions today. Left thoracic hypomobility present with increased tone noted in left thoracic paraspinals. Improved with manual therapy. Posture awareness continues to improve with minimal cues required for posture. Overall making great progress towards goals.       Goals:   to be met in 10 visits)   Pt will improve transversus abdominis recruitment to perform proper isometric contraction without requiring verbal or tactile cuing to promote advancement of therex. Met    Pt will demonstrate good understanding of proper posture and body mechanics to decrease pain and improve spinal safety. Met   Pt will improve lumbar spine AROM flexion to >80 deg to allow increase ease with bending forward to don shoes. In progress   Pt will report improved symptom centralization and absence of radicular symptoms for 3 consecutive days to improve function with ADL. In Progress   Pt will have decreased paraspinal mm tension to tolerate standing >45 minutes for home activities with minimal LBP, In progress  Pt will be independent and compliant with comprehensive HEP to maintain progress achieved in PT . In Progress for final HEP  Pt will improve cervical AROM rotation to >70 degrees to improve tolerance for turning head to check blind spot while driving. In Progress  Pt will have improved thoracic PA mobility to WNL to improve cervical ROM as well as promote upright posturing and decreased pain with lifting > gallon of milk. In Progress  Improve bilat shoulder AROM to minimal pain/ no pain all planes for self care and ADLS. In Progress  Pt will improve postural strength (mid/low trap) to 4+/5 to promote improved upright posturing and decreased pain with carrying groceries. In Progress   Centralize R/L UE symptoms. In Progress  Pt will be independent and compliant with comprehensive HEP to maintain progress achieved in PT. In Progress    Plan: Re-assess  next session. Plan discharge to HEP  Date: 3/28/2024  Tx# 11/18   Date: 4/16/2024  Tx# 12/18 Date: 4/29/2024  Tx# 13/18 Date: 5/8/2024  Tx# 14/18 Date: 5/16/2024  Tx# 15/18 Date: 5/21/2024  Tx# 16/18 Date: 5/30/2024  Tx# 17/18   NUstep L3 x5 min UBE x5 min UBE x5 min UBE x 5min UBE x5 min UBE x5 min UBE x3 min   GTB scapular rows 2x10  Doorway pectoral stretch 3x10 sec  Standing against 1/2 FR on wall: alt OH flexion,, bilat sh diag V, bilat Sh H add/ER x10 ea  Wall push ups x15  Bilat sh ER in neutral x10  Self thoracic ext mobilization x10 GTB scapular rows 2x10  GTB high rows x20  Doorway pectoral stretch 3x10 sec  Standing against 1/2 FR on wall: alt OH flexion,, bilat sh diag V, bilat Sh H add/ER x10 ea  Wall push ups x15  Bilat sh ER in neutral x10  Self thoracic ext mobilization x10  L/R s/l thoracic rotation (open/close book) x10 ea GTB scapular rows x25  GTB high rows x25  Doorway pectoral stretch 3x10 sec  Standing against 1/2 FR on wall: alt OH flexion,, bilat sh diag V, bilat Sh H add/ER x10 ea  Wall push ups on YSB 2x10  Bilat sh ER in neutral x10  Self thoracic ext mobilization  2x10  L/R s/l thoracic rotation (open/close book) x10 ea  R wrist flexion/extension stretch 3x20 seconds Doorway pectoral stretch 3x10 sec  GTB scapular rows x20  GTB high rows x25  Standing against 1/2 FR on wall: alt OH flexion,, bilat sh diag V, bilat Sh H add/ER x10 ea  Wall push ups on YSB 2x10  Bilat sh ER in neutral x10  Self thoracic ext mobilization  2x10 Doorway pectoral stretch 3x10 sec  GTB scapular rows x25  GTB high rows x20  Standing against 1/2 FR on wall: alt OH flexion,, bilat sh diag V, bilat Sh H add/ER x10 ea  Wall push ups on YSB 2x10  Bilat sh ER in neutral x10  Bilat Sh H abd YTB x10  Self thoracic ext mobilization  2x10   Doorway pectoral stretch 3x10 sec  GTB scapular rows x25  GTB high rows x20  Standing against 1/2 FR on wall: alt OH flexion,, bilat sh diag V, bilat Sh H add/ER x10 ea  Wall push ups  on YSB 2x10  Bilat sh ER in neutral YTBx10  Bilat Sh H abd YTB x10 GTB scapular rows x20  Standing against 1/2 FR on wall: alt OH flexion,, bilat sh diag V, bilat Sh H add/ER x10 ea  Self thoracic ext mobilization  2x10   Cervical distraction, PROM and STM x10 min  Prone; thoracic PA and lateral mobilizations GR 3-4 x10 min Cervical distraction, PROM and STM x10 min  Prone; thoracic PA and lateral mobilizations GR 3-4 x10 min Cervical distraction, PROM and STM x10 min  Prone; thoracic PA and lateral mobilizations GR 3-4 x10 min Cervical distraction, PROM and STM x10 min  Prone; thoracic PA and lateral mobilizations GR 3-4 x 5 min Cervical distraction, PROM and STM x10 min  Prone; thoracic PA and lateral mobilizations GR 3-4 x 5 min Cervical distraction, PROM and STM x10 min  Prone; thoracic PA  GR 3-4 x 5 min Cervical distraction, PROM and  lateral mobilizations x5 min  Prone; thoracic PA  GR 3-4 x 5 min   Prone rows x10  Prone rows, sh ext,  sh H abd x10 ea Prone rows, sh ext,  sh H abd x10 ea  IASTM Left UT x5 min             Hkly: pelvic tilt 10x10 sec  Hkly: LTR x10 ea  Supine: SKTC 5 x10 sec  Hkly: bilat hip abd RTB 2x10         HEP: Access Code: [email or web address]  URL: [email or web address]  Date: 01/24/2024  Prepared by: Bhakti Baron     Exercises  - Supine Posterior Pelvic Tilt  - 3 x daily - 7 x weekly - 1 sets - 10 reps - 10 sec hold  - Supine Lower Trunk Rotation  - 3 x daily - 7 x weekly - 1 sets - 10 reps - 3 sec hold  - Supine Bridge  - 3 x daily - 7 x weekly - 1 sets - 10 reps - 5 sec hold  - Supine Single Knee to Chest Stretch  - 3 x daily - 7 x weekly - 1 sets - 5 reps - 30 sec hold  - Clamshell  - 3 x daily - 7 x weekly - 1 sets - 10 reps - 3 sec hold    Access Code: [email or web address]  URL: [email or web address]  Date: 02/15/2024  Prepared by: Bhakti Baron     Exercises  - Standing shoulder flexion wall slides  - 3 x daily - 7 x weekly - 1 sets - 10 reps - 2 sec hold  - Doorway Pec Stretch at 60  Elevation  - 3 x daily - 7 x weekly - 1 sets - 3 reps - 10 sec hold  - Standing Shoulder Row with Anchored Resistance  - 3 x daily - 7 x weekly - 1 sets - 15 reps - 3 sec hold  - Thoracic Extension Mobilization on Foam Roll  - 1 x daily - 7 x weekly - 3 sets - 10 reps  - Radial Nerve Flossing  - 3 x daily - 7 x weekly - 1 sets - 10 reps  - Median Nerve Flossing - Tray  - 3 x daily - 7 x weekly - 1 sets - 10 reps     4/29/2024  Standing Wrist Flexion Stretch  - 3 x daily - 7 x weekly - 1 sets - 3 reps - 20 sec hold  - Standing Wrist Extension Stretch  - 3 x daily - 7 x weekly - 1 sets - 3 reps - 20 sec hold    Charges: Ex 1 (20) MT 1(10)       Total Timed Treatment: 30 min  Total Treatment Time: 30 min

## 2024-06-05 ENCOUNTER — OFFICE VISIT (OUTPATIENT)
Dept: PHYSICAL THERAPY | Age: 59
End: 2024-06-05
Attending: PHYSICIAN ASSISTANT
Payer: COMMERCIAL

## 2024-06-05 PROCEDURE — 97110 THERAPEUTIC EXERCISES: CPT

## 2024-06-05 NOTE — PROGRESS NOTES
Diagnosis:   Chronic left-sided low back pain with left-sided sciatica (M54.42,G89.29)          Referring Provider: [facility]  Date of Evaluation:    1/24/2024    Precautions:  None Next MD visit:   none scheduled  Date of Surgery: n/a   Insurance Primary/Secondary: [place] / N/A     # Auth Visits: 18 auth   until 5/24/24     ** insurance agreed to combine neck and low back treatment so today we will combine treatments**     Discharge Summary  Pt has attended 18 visits in Physical Therapy.  Rosy reports feeling 80% improvement in her lower back and neck pain since starting therapy. She has made excellent improvements with her UE and LE strength, cervical and lumbar ROM and functional mobility. She demonstrates independence with her HEP and will be discharged from PT to a HEP at this time.    Objective:   Lumbar AROM: (* denotes performed with pain)  Flexion: 90 deg  Extension: 30 deg   Sidebending: R 35 deg; L  35eg  Rotation: R 100%; L 75%    cervical AROM: (* denotes performed with pain)  Flexion: 55 deg  Extension: 70 deg  Sidebending: R 38 deg; L 38 deg  Rotation: R 85 deg; L 80 deg     Strength: (* denotes performed with pain)  LE Shoulder/scapular   Hip flexion (L2): R 5/5; L 5/5  Hip abduction: R 4/5; L 4/5  Hip Extension: R 4/5; L 4/5            Hip ER: R 4+/5; L 4+/5  Hip IR: R 4+/5; L 4+/5  Knee Flexion: R 5/5; L 5/5            Knee extension (L3): R 5/5; L 5/5            DF (L4): R 5/5; L 5/5  Great Toe Ext (L5): R 5/5, L 5/5  PF (S1): R 5/5; L 5/5 Shoulder Flex: R 5/5*, L 5/5  Shoulder ABD (C5): R 5/5;, L 5/5  Biceps (C6): R 5/5, L 5/5  Wrist ext (C6): R 5/5, L 5/5  Triceps (C7): R 5/5, L 5/5  Wrist Flex (C7): R 5/5, L 5/5  Digit Flex (C8): R 5/5, L 5/5  Thumb Ext (C8): R 5/5, L 5/5  Interossei (T1): R 5/5, L 5/5     Rhomboids: R 4/5, L 4/5  Mid trap: R 4/5; L 4/5      Flexibility:   LE   Hip Flexor: RWNL, L WNL  Hamstrings: R 90 deg; L 80 deg  Piriformis: R WNL; L WNL       Goals:   to be met in 10  visits)   Pt will improve transversus abdominis recruitment to perform proper isometric contraction without requiring verbal or tactile cuing to promote advancement of therex. Met    Pt will demonstrate good understanding of proper posture and body mechanics to decrease pain and improve spinal safety. Met   Pt will improve lumbar spine AROM flexion to >80 deg to allow increase ease with bending forward to don shoes. Met  Pt will report improved symptom centralization and absence of radicular symptoms for 3 consecutive days to improve function with ADL. Met   Pt will have decreased paraspinal mm tension to tolerate standing >45 minutes for home activities with minimal LBP. Met   Pt will be independent and compliant with comprehensive HEP to maintain progress achieved in PT . Met   Pt will improve cervical AROM rotation to >70 degrees to improve tolerance for turning head to check blind spot while driving. Met   Pt will have improved thoracic PA mobility to WNL to improve cervical ROM as well as promote upright posturing and decreased pain with lifting > gallon of milk. Met   Improve bilat shoulder AROM to minimal pain/ no pain all planes for self care and ADLS. Met   Pt will improve postural strength (mid/low trap) to 4+/5 to promote improved upright posturing and decreased pain with carrying groceries. Met   Centralize R/L UE symptoms. Progressing  Pt will be independent and compliant with comprehensive HEP to maintain progress achieved in PT. Met    Plan: Recommend to discharge patient from PT to HEP.         Patient/Family/Caregiver was advised of these findings, precautions, and treatment options and has agreed to actively participate in planning and for this course of care.    Thank you for your referral. If you have any questions, please contact me at Dept: [phone].    Sincerely,  Electronically signed by therapist: Bhakti Baron, PT    Physician's certification required: No  Please co-sign or sign and  return this letter via fax as soon as possible to [phone].   I certify the need for these services furnished under this plan of treatment and while under my care.    X___________________________________________________ Date____________________    Certification From: 6/5/2024  To:9/3/2024            Subjective: feels 80% improvement in her neck and lower back pain.  Pain: 2/10 low back and 2/10 neck.  Right medial elbow: 2/10     Date: 5/16/2024  Tx# 15/18 Date: 5/21/2024  Tx# 16/18 Date: 5/30/2024  Tx# 17/18 Date: 6/5/2024  Tx# 18/18   UBE x5 min UBE x5 min UBE x3 min Nu step L2 x5 min   Doorway pectoral stretch 3x10 sec  GTB scapular rows x25  GTB high rows x20  Standing against 1/2 FR on wall: alt OH flexion,, bilat sh diag V, bilat Sh H add/ER x10 ea  Wall push ups on YSB 2x10  Bilat sh ER in neutral x10  Bilat Sh H abd YTB x10  Self thoracic ext mobilization  2x10   Doorway pectoral stretch 3x10 sec  GTB scapular rows x25  GTB high rows x20  Standing against 1/2 FR on wall: alt OH flexion,, bilat sh diag V, bilat Sh H add/ER x10 ea  Wall push ups on YSB 2x10  Bilat sh ER in neutral YTBx10  Bilat Sh H abd YTB x10 GTB scapular rows x20  Standing against 1/2 FR on wall: alt OH flexion,, bilat sh diag V, bilat Sh H add/ER x10 ea  Self thoracic ext mobilization  2x10 RTB lateral and monster band walking 35ft x 2 ea  Reformer 4 bands double knee ext with TrA 2x20 reps, 5 bands 1x20 reps  TRX low rows x10  Wall push ups on YSB 2x10  Self thoracic ext mobilization  2x10   Cervical distraction, PROM and STM x10 min  Prone; thoracic PA and lateral mobilizations GR 3-4 x 5 min Cervical distraction, PROM and STM x10 min  Prone; thoracic PA  GR 3-4 x 5 min Cervical distraction, PROM and  lateral mobilizations x5 min  Prone; thoracic PA  GR 3-4 x 5 min Cervical distraction, PROM and  lateral mobilizations x5 min    IASTM Left UT x5 min  Re-assess            Hkly: pelvic tilt 10x10 sec  Hkly: LTR x10 ea  Supine: SKTC 3x10  sec     HEP: Access Code: [email or web address]  URL: [email or web address]  Date: 01/24/2024  Prepared by: Bhakti Baron     Exercises  - Supine Posterior Pelvic Tilt  - 3 x daily - 7 x weekly - 1 sets - 10 reps - 10 sec hold  - Supine Lower Trunk Rotation  - 3 x daily - 7 x weekly - 1 sets - 10 reps - 3 sec hold  - Supine Bridge  - 3 x daily - 7 x weekly - 1 sets - 10 reps - 5 sec hold  - Supine Single Knee to Chest Stretch  - 3 x daily - 7 x weekly - 1 sets - 5 reps - 30 sec hold  - Clamshell  - 3 x daily - 7 x weekly - 1 sets - 10 reps - 3 sec hold    Access Code: [email or web address]  URL: [email or web address]  Date: 02/15/2024  Prepared by: Bhakti Díazel     Exercises  - Standing shoulder flexion wall slides  - 3 x daily - 7 x weekly - 1 sets - 10 reps - 2 sec hold  - Doorway Pec Stretch at 60 Elevation  - 3 x daily - 7 x weekly - 1 sets - 3 reps - 10 sec hold  - Standing Shoulder Row with Anchored Resistance  - 3 x daily - 7 x weekly - 1 sets - 15 reps - 3 sec hold  - Thoracic Extension Mobilization on Foam Roll  - 1 x daily - 7 x weekly - 3 sets - 10 reps  - Radial Nerve Flossing  - 3 x daily - 7 x weekly - 1 sets - 10 reps  - Median Nerve Flossing - Tray  - 3 x daily - 7 x weekly - 1 sets - 10 reps     4/29/2024  Standing Wrist Flexion Stretch  - 3 x daily - 7 x weekly - 1 sets - 3 reps - 20 sec hold  - Standing Wrist Extension Stretch  - 3 x daily - 7 x weekly - 1 sets - 3 reps - 20 sec hold    Charges: Ex 3 (45)      Total Timed Treatment: 45 min  Total Treatment Time: 45 min

## 2024-09-09 ENCOUNTER — LAB ENCOUNTER (OUTPATIENT)
Dept: LAB | Age: 59
End: 2024-09-09
Attending: STUDENT IN AN ORGANIZED HEALTH CARE EDUCATION/TRAINING PROGRAM
Payer: COMMERCIAL

## 2024-09-09 ENCOUNTER — OFFICE VISIT (OUTPATIENT)
Facility: CLINIC | Age: 59
End: 2024-09-09
Payer: COMMERCIAL

## 2024-09-09 VITALS
HEIGHT: 66 IN | BODY MASS INDEX: 27 KG/M2 | HEART RATE: 58 BPM | OXYGEN SATURATION: 98 % | WEIGHT: 168 LBS | DIASTOLIC BLOOD PRESSURE: 66 MMHG | SYSTOLIC BLOOD PRESSURE: 108 MMHG

## 2024-09-09 DIAGNOSIS — E89.0 POSTOPERATIVE HYPOTHYROIDISM: ICD-10-CM

## 2024-09-09 DIAGNOSIS — E55.9 VITAMIN D DEFICIENCY, UNSPECIFIED: Primary | ICD-10-CM

## 2024-09-09 DIAGNOSIS — C73 PAPILLARY THYROID CARCINOMA (HCC): ICD-10-CM

## 2024-09-09 LAB
T4 FREE SERPL-MCNC: 1.5 NG/DL (ref 0.8–1.7)
TSI SER-ACNC: 1.06 MIU/ML (ref 0.55–4.78)

## 2024-09-09 PROCEDURE — 3078F DIAST BP <80 MM HG: CPT | Performed by: STUDENT IN AN ORGANIZED HEALTH CARE EDUCATION/TRAINING PROGRAM

## 2024-09-09 PROCEDURE — 99204 OFFICE O/P NEW MOD 45 MIN: CPT | Performed by: STUDENT IN AN ORGANIZED HEALTH CARE EDUCATION/TRAINING PROGRAM

## 2024-09-09 PROCEDURE — 84443 ASSAY THYROID STIM HORMONE: CPT | Performed by: STUDENT IN AN ORGANIZED HEALTH CARE EDUCATION/TRAINING PROGRAM

## 2024-09-09 PROCEDURE — 84439 ASSAY OF FREE THYROXINE: CPT | Performed by: STUDENT IN AN ORGANIZED HEALTH CARE EDUCATION/TRAINING PROGRAM

## 2024-09-09 PROCEDURE — 3074F SYST BP LT 130 MM HG: CPT | Performed by: STUDENT IN AN ORGANIZED HEALTH CARE EDUCATION/TRAINING PROGRAM

## 2024-09-09 PROCEDURE — 3008F BODY MASS INDEX DOCD: CPT | Performed by: STUDENT IN AN ORGANIZED HEALTH CARE EDUCATION/TRAINING PROGRAM

## 2024-09-09 NOTE — H&P
[facility] Note    Name: Rosy Gray    Date: 9/9/2024    Rosy Gray is a 58 year old female who presents for evaluation of hypoTH management.     Chief complaint: New Patient ([facility] , Dx Hypothyroidism , Pt stated she had thyroid cancer - thyroidectomy 2008)       Subjective:   Initial HPI consult - 9/9/2024  Pt presents today to discuss hx of PTC s/p ttx and postsurgical hypothyroidism. She was previously following with Dr. Smith at [facility] (LOV 1/2024). Per review, patient was diagnosed with Papillary thyroid CA, 1.0 cm in greatest dimension, confined to thyroid, negative margins, no LNs sampled  SURGERY (procedure/where/date): Total thyroidectomy 12/11/08, [facility]   REMNANT ABLATION (YN/HOW MUCH): 100 mCi I131 1/20/09   Per LOV, w/o any evidence of recurrence or residual disease. Previous endocrinologist had discussed that she does not require routine US. Last US 2017 did not show any remnants or recurrence   She is currently on LT4- 88mcg daily. Takes it appropriately in am empty stomach and waits for 30-45min   Does note her ears feel heavier, she has intermittent muscle spasms and neck pain, CLAROS   She saw ENT who noted there is false vertigo; discussed Epley maneuver   MVI - just vit D  Biotin - None     History/Other:    Allergies, PMH, SocHx and FHx reviewed and updated as appropriate in Epic on    sucralfate 1 g Oral Tab TAKE 1 TABLET (1 G) BY MOUTH IN THE MORNING AND BEFORE BEDTIME 180 tablet 0    famotidine 20 MG Oral Tab Take 1 tablet (20 mg total) by mouth 2 (two) times daily as needed for Heartburn. 60 tablet 0    levothyroxine 88 MCG Oral Tab Take 1 tablet (88 mcg total) by mouth daily.      Cholecalciferol (VITAMIN D3) 3000 UNITS Oral Tab Take 1 capsule by mouth daily.       Allergies   Allergen Reactions    Penicillins RASH    Sulfa Antibiotics RASH and SWELLING    Nsaids RASH     Ibuprofen, Aspirin    Pyrazodine [Phenazopyridine Hcl] RASH      Patient does not remember this medication     Current Outpatient Medications   Medication Sig Dispense Refill    sucralfate 1 g Oral Tab TAKE 1 TABLET (1 G) BY MOUTH IN THE MORNING AND BEFORE BEDTIME 180 tablet 0    famotidine 20 MG Oral Tab Take 1 tablet (20 mg total) by mouth 2 (two) times daily as needed for Heartburn. 60 tablet 0    levothyroxine 88 MCG Oral Tab Take 1 tablet (88 mcg total) by mouth daily.      Cholecalciferol (VITAMIN D3) 3000 UNITS Oral Tab Take 1 capsule by mouth daily.       Past Medical History:    Acute maxillary sinusitis    Arthritis    Atypical mole    Back pain    Bad breath    Bloating    Blood in urine    Change in hair    Chest pain    Chronic pain syndrome    Disorder of thyroid    Dysthymic disorder    Dysuria    Easy bruising    Fatigue    Flatulence/gas pain/belching    Frequent urination    Heartburn    Hemorrhoids    High cholesterol    Hoarseness, chronic    Indigestion    Irregular bowel habits    Leaking of urine    Lumbar herniated disc    Pain in joints    Problems with swallowing    Reflux esophagitis    Sleep disturbance    Sprain of lumbar region    Thyroid cancer (HCC)    Uncomfortable fullness after meals    Unspecified hypothyroidism    Unspecified otitis media    Unspecified psychophysiological malfunction    Unspecified vitamin D deficiency    Von Willebrand's disease (HCC)    Wears glasses    Weight gain    Wheezing     History reviewed. No pertinent family history.  Social history: Reviewed.      ROS/Exam    REVIEW OF SYSTEMS: Ten point review of systems has been performed and is otherwise negative and/or non-contributory, except as described above.     VITALS  Vitals:    09/09/24 1351   BP: 108/66   Pulse: 58   SpO2: 98%   Weight: 168 lb (76.2 kg)   Height: 5' 6\" (1.676 m)       Wt Readings from Last 6 Encounters:   09/09/24 168 lb (76.2 kg)   11/20/23 168 lb (76.2 kg)   08/28/23 170 lb (77.1 kg)   08/09/23 168 lb (76.2 kg)   08/01/23 169 lb 12.1 oz (77 kg)    07/13/23 168 lb (76.2 kg)       PHYSICAL EXAM  CONSTITUTIONAL:  awake, alert, cooperative, no apparent distress, and appears stated age    PSYCH: normal affect  LUNGS: breathing comfortably  CARDIOVASCULAR:  regular rate   NECK:  no palpable thyroid tissue, well healed lateral incision site     Labs/Imaging: Pertinent imaging reviewed.    Thyroid:    Lab Results   Component Value Date    TSH 0.854 01/24/2024    TSH 0.998 11/21/2023    TSH 0.363 07/13/2023    T4F 1.0 01/24/2024    T4F 0.9 06/18/2021    T4F 1.3 08/25/2020         Assessment & Plan:     ICD-10-CM    1. Vitamin D deficiency, unspecified  E55.9       2. Postoperative hypothyroidism  E89.0 TSH and Free T4 [E]     TSH and Free T4 [E]     levothyroxine 88 MCG Oral Tab      3. Papillary thyroid carcinoma (HCC)  C73 Thyroglobulin, Serum or Plasma w/Rflx to LC-MS/MS or MEAGAN [E]     Thyroglobulin Antibody and Tumor Marker          Rosy Gray is a pleasant 58 year old female here for evaluation of:    #PTC  #Postsurgical Hypothyroidism PMHx of PTC diagnosed in 2008 s/p total thyroidectomy 12/2008 with Dr. Villa  Treatment history   Surgery:Total thyroidectomy 12/11/08, [facility]   Papillary thyroid CA, 1.0 cm in greatest dimension, confined to thyroid, negative margins, no LNs sampled  SURGERY (procedure/where/date):   Remnant ablation:100 mCi I131 1/20/09, post-therapy [facility] not on file    Current LT4 dose 88 mcg  TSH goal 0.5-2.0  Surveillance History  Surveillance imaging: Thyroid US on file from 5406-1997, last in 2021 without residual tissue or evidence of suspicious LA  Surveillance labs: TG and TG ab remain low, Tg 0.1 1/2023    TFTs and Tg ordered.   Will continue routine surveillance with biochemical studies. Since patient without residual tissue on US in 2021, and we are currently marking around 16 years since initial diagnosis, will plan on obtaining US PRN if tg rising or patient is symptomatic         Return in about 6 months  (around 3/9/2025).     9/9/2024  Deepthi Menard, DO    Note to patient: The 21 Century Cures Act makes medical notes like these available to patients in the interest of transparency. However, be advised this is a medical document. It is intended as peer to peer communication. It is written in medical language and may contain abbreviations or verbiage that are unfamiliar. It may appear blunt or direct. Medical documents are intended to carry relevant information, facts as evident, and the clinical opinion of the practitioner.

## 2024-09-09 NOTE — PATIENT INSTRUCTIONS
Return Visit   [ x ] Physician in 6 months   [  ] In person or video visit  [  ] In person only    [ x ] After visit summary    -Repeat your labs and I will be in touch with next steps   -Continue your levothyroxine 88 mcg daily, I will refill once your labs result  -You are due for repeat labs in 6 months prior to our follow up visit     Take care!  -Dr. Menard

## 2024-09-10 RX ORDER — LEVOTHYROXINE SODIUM 88 UG/1
88 TABLET ORAL DAILY
Qty: 90 TABLET | Refills: 1 | Status: SHIPPED | OUTPATIENT
Start: 2024-09-10 | End: 2025-03-09

## 2024-12-17 ENCOUNTER — LAB ENCOUNTER (OUTPATIENT)
Dept: LAB | Age: 59
End: 2024-12-17
Attending: FAMILY MEDICINE
Payer: COMMERCIAL

## 2024-12-17 ENCOUNTER — OFFICE VISIT (OUTPATIENT)
Dept: FAMILY MEDICINE CLINIC | Facility: CLINIC | Age: 59
End: 2024-12-17
Payer: COMMERCIAL

## 2024-12-17 VITALS
HEIGHT: 66 IN | RESPIRATION RATE: 16 BRPM | OXYGEN SATURATION: 95 % | SYSTOLIC BLOOD PRESSURE: 104 MMHG | DIASTOLIC BLOOD PRESSURE: 68 MMHG | HEART RATE: 73 BPM | WEIGHT: 170 LBS | BODY MASS INDEX: 27.32 KG/M2

## 2024-12-17 DIAGNOSIS — Z00.00 ROUTINE GENERAL MEDICAL EXAMINATION AT A HEALTH CARE FACILITY: ICD-10-CM

## 2024-12-17 DIAGNOSIS — R25.2 LEG CRAMPS: Primary | ICD-10-CM

## 2024-12-17 DIAGNOSIS — H04.123 DRY EYES: ICD-10-CM

## 2024-12-17 DIAGNOSIS — G89.29 CHRONIC ABDOMINAL PAIN: ICD-10-CM

## 2024-12-17 DIAGNOSIS — R25.2 LEG CRAMPS: ICD-10-CM

## 2024-12-17 DIAGNOSIS — R10.9 CHRONIC ABDOMINAL PAIN: ICD-10-CM

## 2024-12-17 DIAGNOSIS — R51.9 CHRONIC INTRACTABLE HEADACHE, UNSPECIFIED HEADACHE TYPE: ICD-10-CM

## 2024-12-17 DIAGNOSIS — G89.29 CHRONIC INTRACTABLE HEADACHE, UNSPECIFIED HEADACHE TYPE: ICD-10-CM

## 2024-12-17 LAB
ALBUMIN SERPL-MCNC: 4.3 G/DL (ref 3.2–4.8)
ALBUMIN/GLOB SERPL: 1.4 {RATIO} (ref 1–2)
ALP LIVER SERPL-CCNC: 70 U/L
ALT SERPL-CCNC: 24 U/L
ANION GAP SERPL CALC-SCNC: 6 MMOL/L (ref 0–18)
AST SERPL-CCNC: 27 U/L (ref ?–34)
BILIRUB SERPL-MCNC: 0.6 MG/DL (ref 0.2–1.1)
BUN BLD-MCNC: 17 MG/DL (ref 9–23)
CALCIUM BLD-MCNC: 9.6 MG/DL (ref 8.7–10.4)
CHLORIDE SERPL-SCNC: 106 MMOL/L (ref 98–112)
CO2 SERPL-SCNC: 28 MMOL/L (ref 21–32)
CREAT BLD-MCNC: 0.77 MG/DL
DEPRECATED HBV CORE AB SER IA-ACNC: 37 NG/ML
EGFRCR SERPLBLD CKD-EPI 2021: 88 ML/MIN/1.73M2 (ref 60–?)
FASTING STATUS PATIENT QL REPORTED: NO
GLOBULIN PLAS-MCNC: 3.1 G/DL (ref 2–3.5)
GLUCOSE BLD-MCNC: 88 MG/DL (ref 70–99)
MAGNESIUM SERPL-MCNC: 2 MG/DL (ref 1.6–2.6)
OSMOLALITY SERPL CALC.SUM OF ELEC: 291 MOSM/KG (ref 275–295)
POTASSIUM SERPL-SCNC: 4 MMOL/L (ref 3.5–5.1)
PROT SERPL-MCNC: 7.4 G/DL (ref 5.7–8.2)
SODIUM SERPL-SCNC: 140 MMOL/L (ref 136–145)

## 2024-12-17 PROCEDURE — 86003 ALLG SPEC IGE CRUDE XTRC EA: CPT | Performed by: FAMILY MEDICINE

## 2024-12-17 PROCEDURE — 86235 NUCLEAR ANTIGEN ANTIBODY: CPT | Performed by: FAMILY MEDICINE

## 2024-12-17 PROCEDURE — 83735 ASSAY OF MAGNESIUM: CPT | Performed by: FAMILY MEDICINE

## 2024-12-17 PROCEDURE — 82785 ASSAY OF IGE: CPT | Performed by: FAMILY MEDICINE

## 2024-12-17 PROCEDURE — 82728 ASSAY OF FERRITIN: CPT | Performed by: FAMILY MEDICINE

## 2024-12-17 PROCEDURE — 99214 OFFICE O/P EST MOD 30 MIN: CPT | Performed by: FAMILY MEDICINE

## 2024-12-17 PROCEDURE — 3074F SYST BP LT 130 MM HG: CPT | Performed by: FAMILY MEDICINE

## 2024-12-17 PROCEDURE — 80053 COMPREHEN METABOLIC PANEL: CPT | Performed by: FAMILY MEDICINE

## 2024-12-17 PROCEDURE — 80061 LIPID PANEL: CPT | Performed by: PHYSICIAN ASSISTANT

## 2024-12-17 PROCEDURE — 3008F BODY MASS INDEX DOCD: CPT | Performed by: FAMILY MEDICINE

## 2024-12-17 PROCEDURE — 3078F DIAST BP <80 MM HG: CPT | Performed by: FAMILY MEDICINE

## 2024-12-17 RX ORDER — SUMATRIPTAN 50 MG/1
50 TABLET, FILM COATED ORAL EVERY 2 HOUR PRN
Qty: 8 TABLET | Refills: 3 | Status: SHIPPED
Start: 2024-12-17 | End: 2024-12-20 | Stop reason: ALTCHOICE

## 2024-12-17 NOTE — PROGRESS NOTES
Subjective:   Patient ID: Rosy Gray is a 59 year old female.    2-3 times over last month.  Pain b/l temple and orbital area.  Associated with abd discomfort.  No nausea.  + constipation.  Worse with HA and abd discomfort when consuming beef.  Last about 3 days.  Has not taken anything for the headache.  Tylenol doesn't work.  Sometimes nexium helps sometime it doesn't.  States its not a migraine.        History/Other:   Review of Systems   All other systems reviewed and are negative.    Current Outpatient Medications   Medication Sig Dispense Refill    SUMAtriptan 50 MG Oral Tab Take 1 tablet (50 mg total) by mouth every 2 (two) hours as needed for Migraine (max 100mg in 24 hrs.). 8 tablet 3    levothyroxine 88 MCG Oral Tab Take 1 tablet (88 mcg total) by mouth daily. 90 tablet 1    sucralfate 1 g Oral Tab TAKE 1 TABLET (1 G) BY MOUTH IN THE MORNING AND BEFORE BEDTIME 180 tablet 0    famotidine 20 MG Oral Tab Take 1 tablet (20 mg total) by mouth 2 (two) times daily as needed for Heartburn. 60 tablet 0    Cholecalciferol (VITAMIN D3) 3000 UNITS Oral Tab Take 1 capsule by mouth daily.       Allergies:Allergies[1]    Objective:   Physical Exam  Vitals reviewed.   Constitutional:       General: She is not in acute distress.     Appearance: She is well-developed. She is not diaphoretic.   Eyes:      General: No scleral icterus.        Right eye: No discharge.         Left eye: No discharge.      Conjunctiva/sclera: Conjunctivae normal.   Cardiovascular:      Rate and Rhythm: Normal rate and regular rhythm.      Heart sounds: Normal heart sounds. No murmur heard.     No friction rub. No gallop.   Pulmonary:      Effort: Pulmonary effort is normal. No respiratory distress.      Breath sounds: Normal breath sounds. No wheezing or rales.   Abdominal:      General: Bowel sounds are normal. There is no distension.      Palpations: Abdomen is soft. There is no mass.      Tenderness: There is abdominal tenderness.       Comments: Diffuse abd tenderness.       Assessment & Plan:   1. Leg cramps    2. Chronic abdominal pain    3. Chronic intractable headache, unspecified headache type    4. Dry eyes        1. Leg cramps  - Comp Metabolic Panel (14) [E]; Future  - Magnesium [E]; Future  - Ferritin; Future    2. Chronic abdominal pain  - Adult Food Allergy Prof [E]; Future  - Comp Metabolic Panel (14) [E]; Future  - H PYLORI BREATH TEST [41992][Q]; Future    3. Chronic intractable headache, unspecified headache type  - Adult Food Allergy Prof [E]; Future  - Comp Metabolic Panel (14) [E]; Future  - SJOGREN'S AB, ANTI-SS-A/-SS-B [7832] [Q]; Future  - SUMAtriptan 50 MG Oral Tab; Take 1 tablet (50 mg total) by mouth every 2 (two) hours as needed for Migraine (max 100mg in 24 hrs.).  Dispense: 8 tablet; Refill: 3    4. Dry eyes  - SJOGREN'S AB, ANTI-SS-A/-SS-B [7832] [Q]; Future      Meds This Visit:  Requested Prescriptions     Signed Prescriptions Disp Refills    SUMAtriptan 50 MG Oral Tab 8 tablet 3     Sig: Take 1 tablet (50 mg total) by mouth every 2 (two) hours as needed for Migraine (max 100mg in 24 hrs.).       Imaging & Referrals:  None         [1]   Allergies  Allergen Reactions    Penicillins RASH    Sulfa Antibiotics RASH and SWELLING    Nsaids RASH     Ibuprofen, Aspirin    Pyrazodine [Phenazopyridine Hcl] RASH     Patient does not remember this medication

## 2024-12-17 NOTE — PATIENT INSTRUCTIONS
Take miralax daily x 3 months.  Yogurt daily.    No acid reflux medication x 2 weeks, then get breath test.      Get blood test done now.    Follow up in 1 month.

## 2024-12-18 LAB
ENA SS-A IGG SER IA-ACNC: <0.4 U/ML
ENA SS-B IGG SER IA-ACNC: <0.4 U/ML

## 2024-12-20 ENCOUNTER — OFFICE VISIT (OUTPATIENT)
Dept: FAMILY MEDICINE CLINIC | Facility: CLINIC | Age: 59
End: 2024-12-20
Payer: COMMERCIAL

## 2024-12-20 VITALS
BODY MASS INDEX: 27.32 KG/M2 | HEART RATE: 78 BPM | DIASTOLIC BLOOD PRESSURE: 68 MMHG | WEIGHT: 170 LBS | OXYGEN SATURATION: 99 % | RESPIRATION RATE: 16 BRPM | SYSTOLIC BLOOD PRESSURE: 110 MMHG | HEIGHT: 66 IN

## 2024-12-20 DIAGNOSIS — E89.0 POSTOPERATIVE HYPOTHYROIDISM: ICD-10-CM

## 2024-12-20 DIAGNOSIS — Z13.820 ENCOUNTER FOR OSTEOPOROSIS SCREENING IN ASYMPTOMATIC POSTMENOPAUSAL PATIENT: ICD-10-CM

## 2024-12-20 DIAGNOSIS — J45.20 MILD INTERMITTENT ASTHMA WITHOUT COMPLICATION (HCC): ICD-10-CM

## 2024-12-20 DIAGNOSIS — Z12.31 VISIT FOR SCREENING MAMMOGRAM: ICD-10-CM

## 2024-12-20 DIAGNOSIS — Z00.00 ROUTINE GENERAL MEDICAL EXAMINATION AT A HEALTH CARE FACILITY: Primary | ICD-10-CM

## 2024-12-20 DIAGNOSIS — R30.0 DYSURIA: ICD-10-CM

## 2024-12-20 DIAGNOSIS — Z78.0 ENCOUNTER FOR OSTEOPOROSIS SCREENING IN ASYMPTOMATIC POSTMENOPAUSAL PATIENT: ICD-10-CM

## 2024-12-20 LAB
APPEARANCE: CLEAR
BILIRUBIN: NEGATIVE
CHOLEST SERPL-MCNC: 206 MG/DL (ref ?–200)
GLUCOSE (URINE DIPSTICK): NEGATIVE MG/DL
HDLC SERPL-MCNC: 64 MG/DL (ref 40–59)
KETONES (URINE DIPSTICK): NEGATIVE MG/DL
LDLC SERPL CALC-MCNC: 125 MG/DL (ref ?–100)
LEUKOCYTES: NEGATIVE
MULTISTIX LOT#: ABNORMAL NUMERIC
NITRITE, URINE: NEGATIVE
NONHDLC SERPL-MCNC: 142 MG/DL (ref ?–130)
PH, URINE: 5.5 (ref 4.5–8)
PROTEIN (URINE DIPSTICK): NEGATIVE MG/DL
SPECIFIC GRAVITY: >1.03 (ref 1–1.03)
TRIGL SERPL-MCNC: 94 MG/DL (ref 30–149)
URINE-COLOR: YELLOW
UROBILINOGEN,SEMI-QN: 0.2 MG/DL (ref 0–1.9)
VLDLC SERPL CALC-MCNC: 17 MG/DL (ref 0–30)

## 2024-12-20 PROCEDURE — 3008F BODY MASS INDEX DOCD: CPT | Performed by: PHYSICIAN ASSISTANT

## 2024-12-20 PROCEDURE — 99396 PREV VISIT EST AGE 40-64: CPT | Performed by: PHYSICIAN ASSISTANT

## 2024-12-20 PROCEDURE — 81001 URINALYSIS AUTO W/SCOPE: CPT | Performed by: PHYSICIAN ASSISTANT

## 2024-12-20 PROCEDURE — 3078F DIAST BP <80 MM HG: CPT | Performed by: PHYSICIAN ASSISTANT

## 2024-12-20 PROCEDURE — 87086 URINE CULTURE/COLONY COUNT: CPT | Performed by: PHYSICIAN ASSISTANT

## 2024-12-20 PROCEDURE — 3074F SYST BP LT 130 MM HG: CPT | Performed by: PHYSICIAN ASSISTANT

## 2024-12-20 NOTE — PROGRESS NOTES
Subjective:   Patient ID: Rosy Gray is a 59 year old female.    HPI  Patient presents today requesting physical exam.      Diet: well balanced  Exercise: regular activity  Has not been sleeping very well  Tobacco use: denies  Drug use: denies  Alcohol use: denies  Marital status: , 4 children, no grandchildren  Occupation: homemaker     Health maintenance:  Pap/Hpv: 11/3/22 negative  Mammogram: 11/30/22 stable  Performs SBEs: yes  Dexa scan: 11/30/22 osteopenia  Colonoscopy: 7/1/23 polyps, recall 7 years  Discussed age appropriate vaccines    She had labs done a few days ago which were reviewed    History/Other:   Review of Systems   Constitutional:  Negative for chills, fatigue and fever.   HENT:  Negative for congestion, ear pain, rhinorrhea and sore throat.    Eyes:  Negative for visual disturbance.   Respiratory:  Negative for cough, shortness of breath and wheezing.    Cardiovascular:  Negative for chest pain, palpitations and leg swelling.   Gastrointestinal:  Negative for abdominal pain, diarrhea, nausea and vomiting.   Genitourinary:  Negative for dysuria, frequency and hematuria.   Musculoskeletal:  Negative for arthralgias, gait problem and myalgias.   Skin:  Negative for rash.   Neurological:  Negative for weakness, light-headedness and headaches.   Hematological:  Negative for adenopathy.   Psychiatric/Behavioral:  Positive for sleep disturbance. Negative for dysphoric mood. The patient is not nervous/anxious.      Current Outpatient Medications   Medication Sig Dispense Refill    levothyroxine 88 MCG Oral Tab Take 1 tablet (88 mcg total) by mouth daily. 90 tablet 1    Cholecalciferol (VITAMIN D3) 3000 UNITS Oral Tab Take 1 capsule by mouth daily.       Allergies:Allergies[1]    Objective:   Physical Exam  Vitals and nursing note reviewed.   Constitutional:       General: She is not in acute distress.     Appearance: Normal appearance. She is well-developed.   HENT:      Head:  Normocephalic and atraumatic.      Right Ear: Tympanic membrane, ear canal and external ear normal.      Left Ear: Tympanic membrane, ear canal and external ear normal.      Nose: Nose normal.      Mouth/Throat:      Mouth: Mucous membranes are moist.   Eyes:      Extraocular Movements: Extraocular movements intact.      Conjunctiva/sclera: Conjunctivae normal.      Pupils: Pupils are equal, round, and reactive to light.   Neck:      Thyroid: No thyromegaly.   Cardiovascular:      Rate and Rhythm: Normal rate and regular rhythm.      Pulses: Normal pulses.      Heart sounds: Normal heart sounds.   Pulmonary:      Effort: Pulmonary effort is normal.      Breath sounds: Normal breath sounds. No wheezing or rales.   Abdominal:      General: Bowel sounds are normal. There is no distension.      Palpations: Abdomen is soft. There is no mass.      Tenderness: There is no abdominal tenderness.   Musculoskeletal:         General: No tenderness. Normal range of motion.      Cervical back: Normal range of motion and neck supple.   Lymphadenopathy:      Cervical: No cervical adenopathy.   Skin:     General: Skin is warm and dry.      Findings: No rash.   Neurological:      General: No focal deficit present.      Mental Status: She is alert and oriented to person, place, and time.   Psychiatric:         Mood and Affect: Mood normal.         Behavior: Behavior normal.         Assessment & Plan:   1. Routine general medical examination at a health care facility  Patient is generally healthy.  Physical exam is unremarkable.  Check fasting labs.  Health maintenance issues discussed. Encouraged routine vaccines.  Advised healthy diet and regular exercise.  Annual physicals.  - Lipid Panel [E]; Future  - FSH [E]; Future  - Estradiol [E]; Future    2. Visit for screening mammogram  - [facility] ISABELLE 2D+3D SCREENING BILAT (CPT=77067/66608); Future    3. Encounter for osteoporosis screening in asymptomatic postmenopausal patient  - XR DEXA  BONE DENSITOMETRY (CPT=77080); Future    4. Dysuria  Check urine studies and follow up pending results. Treat accordingly. Increase water intake.   - Urine Dip, auto with Micro  - Urine Culture, Routine [E]; Future  - Urine Culture, Routine [E]    5. Mild intermittent asthma without complication (HCC)  Controlled. Has not needed treatment. Monitor.     6. Postoperative hypothyroidism  Recent TSH normal. Continue same dose of levothyroxine.              [1]   Allergies  Allergen Reactions    Penicillins RASH    Sulfa Antibiotics RASH and SWELLING    Nsaids RASH     Ibuprofen, Aspirin    Pyrazodine [Phenazopyridine Hcl] RASH     Patient does not remember this medication

## 2024-12-22 LAB
ALLERGEN BRAZIL NUT: <0.1 KUA/L (ref ?–0.1)
ALMOND IGE QN: <0.1 KUA/L (ref ?–0.1)
CASHEW NUT IGE QN: <0.1 KUA/L (ref ?–0.1)
CLAM IGE QN: <0.1 KUA/L (ref ?–0.1)
CODFISH IGE QN: <0.1 KUA/L (ref ?–0.1)
CORN IGE QN: <0.1 KUA/L (ref ?–0.1)
COW MILK IGE QN: 0.14 KUA/L (ref ?–0.1)
EGG WHITE IGE QN: <0.1 KUA/L (ref ?–0.1)
GLUTEN IGE QN: <0.1 KUA/L (ref ?–0.1)
HAZELNUT IGE QN: 1.49 KUA/L (ref ?–0.1)
IGE SERPL-ACNC: 82.1 KU/L (ref 2–214)
PEANUT IGE QN: <0.1 KUA/L (ref ?–0.1)
SALMON IGE QN: <0.1 KUA/L (ref ?–0.1)
SCALLOP IGE QN: <0.1 KUA/L (ref ?–0.1)
SESAME SEED IGE QN: <0.1 KUA/L (ref ?–0.1)
SHRIMP IGE QN: <0.1 KUA/L (ref ?–0.1)
SOYBEAN IGE QN: <0.1 KUA/L (ref ?–0.1)
WALNUT IGE QN: <0.1 KUA/L (ref ?–0.1)
WHEAT IGE QN: <0.1 KUA/L (ref ?–0.1)

## 2024-12-25 PROBLEM — R42 DIZZINESS: Status: RESOLVED | Noted: 2023-08-01 | Resolved: 2024-12-25

## 2024-12-25 PROBLEM — R10.13 ABDOMINAL PAIN, EPIGASTRIC: Status: RESOLVED | Noted: 2023-06-27 | Resolved: 2024-12-25

## 2025-01-13 ENCOUNTER — HOSPITAL ENCOUNTER (OUTPATIENT)
Dept: MAMMOGRAPHY | Age: 60
Discharge: HOME | End: 2025-01-13
Attending: PHYSICIAN ASSISTANT
Payer: COMMERCIAL

## 2025-01-13 ENCOUNTER — LAB ENCOUNTER (OUTPATIENT)
Dept: LAB | Age: 60
End: 2025-01-13
Attending: FAMILY MEDICINE
Payer: COMMERCIAL

## 2025-01-13 ENCOUNTER — HOSPITAL ENCOUNTER (OUTPATIENT)
Dept: BONE DENSITY | Age: 60
Discharge: HOME | End: 2025-01-13
Attending: PHYSICIAN ASSISTANT
Payer: COMMERCIAL

## 2025-01-13 DIAGNOSIS — G89.29 CHRONIC ABDOMINAL PAIN: ICD-10-CM

## 2025-01-13 DIAGNOSIS — R10.9 CHRONIC ABDOMINAL PAIN: ICD-10-CM

## 2025-01-13 DIAGNOSIS — Z12.31 VISIT FOR SCREENING MAMMOGRAM: ICD-10-CM

## 2025-01-13 DIAGNOSIS — Z00.00 ROUTINE GENERAL MEDICAL EXAMINATION AT A HEALTH CARE FACILITY: ICD-10-CM

## 2025-01-13 DIAGNOSIS — Z78.0 ENCOUNTER FOR OSTEOPOROSIS SCREENING IN ASYMPTOMATIC POSTMENOPAUSAL PATIENT: ICD-10-CM

## 2025-01-13 DIAGNOSIS — Z13.820 ENCOUNTER FOR OSTEOPOROSIS SCREENING IN ASYMPTOMATIC POSTMENOPAUSAL PATIENT: ICD-10-CM

## 2025-01-13 LAB
ESTRADIOL SERPL-MCNC: <11.8 PG/ML
FSH SERPL-ACNC: 73.8 MIU/ML

## 2025-01-13 PROCEDURE — 36415 COLL VENOUS BLD VENIPUNCTURE: CPT

## 2025-01-13 PROCEDURE — 77080 DXA BONE DENSITY AXIAL: CPT | Performed by: PHYSICIAN ASSISTANT

## 2025-01-13 PROCEDURE — 77067 SCR MAMMO BI INCL CAD: CPT | Performed by: PHYSICIAN ASSISTANT

## 2025-01-13 PROCEDURE — 83001 ASSAY OF GONADOTROPIN (FSH): CPT

## 2025-01-13 PROCEDURE — 82670 ASSAY OF TOTAL ESTRADIOL: CPT

## 2025-01-13 PROCEDURE — 77063 BREAST TOMOSYNTHESIS BI: CPT | Performed by: PHYSICIAN ASSISTANT

## 2025-01-13 PROCEDURE — 83013 H PYLORI (C-13) BREATH: CPT

## 2025-01-15 LAB — H PYLORI BREATH TEST: NEGATIVE

## 2025-01-30 ENCOUNTER — APPOINTMENT (OUTPATIENT)
Dept: ENDOCRINOLOGY | Age: 60
End: 2025-01-30

## 2025-02-28 ENCOUNTER — TELEPHONE (OUTPATIENT)
Dept: FAMILY MEDICINE CLINIC | Facility: CLINIC | Age: 60
End: 2025-02-28

## 2025-02-28 DIAGNOSIS — E61.1 LOW IRON: Primary | ICD-10-CM

## 2025-02-28 NOTE — TELEPHONE ENCOUNTER
Patient believes she has yeast infection and seeking meds - states she has had this before and Clemencia told her to call if it recurrs

## 2025-02-28 NOTE — TELEPHONE ENCOUNTER
Spoke to pt    Vaginal Pain and itching x 1 week, some discharge   Requesting medication to help again

## 2025-04-09 ENCOUNTER — LAB ENCOUNTER (OUTPATIENT)
Dept: LAB | Age: 60
End: 2025-04-09
Attending: FAMILY MEDICINE
Payer: COMMERCIAL

## 2025-04-09 DIAGNOSIS — E61.1 LOW IRON: ICD-10-CM

## 2025-04-09 DIAGNOSIS — C73 PAPILLARY THYROID CARCINOMA (HCC): ICD-10-CM

## 2025-04-09 DIAGNOSIS — E89.0 POSTOPERATIVE HYPOTHYROIDISM: ICD-10-CM

## 2025-04-09 LAB
BASOPHILS # BLD AUTO: 0.05 X10(3) UL (ref 0–0.2)
BASOPHILS NFR BLD AUTO: 0.8 %
DEPRECATED HBV CORE AB SER IA-ACNC: 40 NG/ML (ref 50–306)
EOSINOPHIL # BLD AUTO: 0.13 X10(3) UL (ref 0–0.7)
EOSINOPHIL NFR BLD AUTO: 2.2 %
ERYTHROCYTE [DISTWIDTH] IN BLOOD BY AUTOMATED COUNT: 13.4 %
HCT VFR BLD AUTO: 40 % (ref 35–48)
HGB BLD-MCNC: 13.5 G/DL (ref 12–16)
IMM GRANULOCYTES # BLD AUTO: 0.01 X10(3) UL (ref 0–1)
IMM GRANULOCYTES NFR BLD: 0.2 %
LYMPHOCYTES # BLD AUTO: 2.42 X10(3) UL (ref 1–4)
LYMPHOCYTES NFR BLD AUTO: 40.3 %
MCH RBC QN AUTO: 29.3 PG (ref 26–34)
MCHC RBC AUTO-ENTMCNC: 33.8 G/DL (ref 31–37)
MCV RBC AUTO: 86.8 FL (ref 80–100)
MONOCYTES # BLD AUTO: 0.5 X10(3) UL (ref 0.1–1)
MONOCYTES NFR BLD AUTO: 8.3 %
NEUTROPHILS # BLD AUTO: 2.89 X10 (3) UL (ref 1.5–7.7)
NEUTROPHILS # BLD AUTO: 2.89 X10(3) UL (ref 1.5–7.7)
NEUTROPHILS NFR BLD AUTO: 48.2 %
PLATELET # BLD AUTO: 264 10(3)UL (ref 150–450)
RBC # BLD AUTO: 4.61 X10(6)UL (ref 3.8–5.3)
T4 FREE SERPL-MCNC: 1.4 NG/DL (ref 0.8–1.7)
TSI SER-ACNC: 0.27 UIU/ML (ref 0.55–4.78)
WBC # BLD AUTO: 6 X10(3) UL (ref 4–11)

## 2025-04-09 PROCEDURE — 84443 ASSAY THYROID STIM HORMONE: CPT | Performed by: STUDENT IN AN ORGANIZED HEALTH CARE EDUCATION/TRAINING PROGRAM

## 2025-04-09 PROCEDURE — 85025 COMPLETE CBC W/AUTO DIFF WBC: CPT | Performed by: FAMILY MEDICINE

## 2025-04-09 PROCEDURE — 84432 ASSAY OF THYROGLOBULIN: CPT | Performed by: STUDENT IN AN ORGANIZED HEALTH CARE EDUCATION/TRAINING PROGRAM

## 2025-04-09 PROCEDURE — 82728 ASSAY OF FERRITIN: CPT | Performed by: FAMILY MEDICINE

## 2025-04-09 PROCEDURE — 84439 ASSAY OF FREE THYROXINE: CPT | Performed by: STUDENT IN AN ORGANIZED HEALTH CARE EDUCATION/TRAINING PROGRAM

## 2025-04-09 PROCEDURE — 86800 THYROGLOBULIN ANTIBODY: CPT | Performed by: STUDENT IN AN ORGANIZED HEALTH CARE EDUCATION/TRAINING PROGRAM

## 2025-04-11 LAB
THYROGLOB AB: <1 IU/ML
THYROGLOB IMA: <0.1 NG/ML

## 2025-04-15 ENCOUNTER — OFFICE VISIT (OUTPATIENT)
Facility: CLINIC | Age: 60
End: 2025-04-15
Payer: COMMERCIAL

## 2025-04-15 VITALS
HEART RATE: 78 BPM | HEIGHT: 66 IN | BODY MASS INDEX: 27.32 KG/M2 | OXYGEN SATURATION: 96 % | DIASTOLIC BLOOD PRESSURE: 70 MMHG | WEIGHT: 170 LBS | SYSTOLIC BLOOD PRESSURE: 108 MMHG

## 2025-04-15 DIAGNOSIS — C73 PAPILLARY THYROID CARCINOMA (HCC): ICD-10-CM

## 2025-04-15 DIAGNOSIS — E89.0 POSTOPERATIVE HYPOTHYROIDISM: Primary | ICD-10-CM

## 2025-04-15 PROCEDURE — 3078F DIAST BP <80 MM HG: CPT | Performed by: STUDENT IN AN ORGANIZED HEALTH CARE EDUCATION/TRAINING PROGRAM

## 2025-04-15 PROCEDURE — 3008F BODY MASS INDEX DOCD: CPT | Performed by: STUDENT IN AN ORGANIZED HEALTH CARE EDUCATION/TRAINING PROGRAM

## 2025-04-15 PROCEDURE — 3074F SYST BP LT 130 MM HG: CPT | Performed by: STUDENT IN AN ORGANIZED HEALTH CARE EDUCATION/TRAINING PROGRAM

## 2025-04-15 PROCEDURE — 99214 OFFICE O/P EST MOD 30 MIN: CPT | Performed by: STUDENT IN AN ORGANIZED HEALTH CARE EDUCATION/TRAINING PROGRAM

## 2025-04-15 RX ORDER — LEVOTHYROXINE SODIUM 88 UG/1
TABLET ORAL
Qty: 100 TABLET | Refills: 1 | Status: SHIPPED | OUTPATIENT
Start: 2025-04-15

## 2025-04-15 RX ORDER — LEVOTHYROXINE SODIUM 88 UG/1
88 TABLET ORAL
COMMUNITY
End: 2025-04-15

## 2025-04-15 NOTE — PATIENT INSTRUCTIONS
Visit Summary  Decrease levothyroxine to 88 mcg Monday-Saturday and 1/2 tablet on Sunday  Repeat your labs in 6 months or sooner if symptomatic  We reviewed your thyroid cancer history and please keep me updated regarding any neck symptoms     General follow up information:  Please let us know if you require any refills at least 1 week prior to your medication running out. If you do run out of medication, please call our office ASAP to request refills (do not wait until your follow up).  Please call us if you experience any problems with insurance coverage of medication, lab work, or imaging.   Lab results and imaging will typically be reviewed at follow up appointments, or within 3-5 business days of ALL results being in if you do not have an appointment scheduled in the near future. Our office will contact you for any abnormal results requiring more urgent follow up or action.  The on-call pager is for urgent matters only. If you are a type 1 diabetic and run out of insulin after business hours 8AM-4PM, you may call the on-call pager for a refill to a 24 hour pharmacy. If you have adrenal insufficiency and run out of steroids, you may call the on-call pager for a refill to a 24 hours pharmacy. All other refill requests should be requested during business hours.    Return Visit   [x]   Dr. Menard in 6 months   [] Virtual visit  [] No follow up appointment needed  [] Follow up to be scheduled pending      []  Fasting/8AM labs  []  Central scheduling # for ultrasound/nuclear med/CT/MRI/DXA/IR  []  Provide flyer for:  [] ENT   [] Weight Management  [] Infertility/Reproductive Endocrinology  [] Transgender care  []  Directions to 1st floor lab  [] Collect urine collection jug  [] Collect salivary cortisol tubes  []  Dental clearance form  []  Will need authorization for outside records

## 2025-04-15 NOTE — PROGRESS NOTES
[facility] Note    Name: Rosy Gray    Date: 4/15/2025    Rosy Gray is a 59 year old female who presents for evaluation of hypoTH management.     Chief complaint: Follow - Up (Consult labs pt c/o hair loss, fatigue and pt would like refill)       Subjective:   Initial HPI consult - 9/9/2024  Pt presents today to discuss hx of PTC s/p ttx and postsurgical hypothyroidism. She was previously following with Dr. Smith at [facility] (LOV 1/2024). Per review, patient was diagnosed with Papillary thyroid CA, 1.0 cm in greatest dimension, confined to thyroid, negative margins, no LNs sampled  SURGERY (procedure/where/date): Total thyroidectomy 12/11/08, [facility]   REMNANT ABLATION (YN/HOW MUCH): 100 mCi I131 1/20/09   Per LOV, w/o any evidence of recurrence or residual disease. Previous endocrinologist had discussed that she does not require routine US. Last US 2017 did not show any remnants or recurrence   She is currently on LT4- 88mcg daily. Takes it appropriately in am empty stomach and waits for 30-45min   Does note her ears feel heavier, she has intermittent muscle spasms and neck pain, CLAROS   She saw ENT who noted there is false vertigo; discussed Epley maneuver   MVI - just vit D  Biotin - None     4/15/2024  4/9/2025 tg <0.1, TG antibody less than 1, TSH 0.266, free T41.4  Does note intermittent loose stools and some heat intolerance. Overall feels weight is stable  Does feel intermittent blurry vision and dizziness  Compliant with LT4 88 mcg daily       History/Other:    Allergies, PMH, SocHx and FHx reviewed and updated as appropriate in Epic on    levothyroxine 88 MCG Oral Tab Take 1 tablet Monday-Saturday and 1/2 tablet on Sunday 100 tablet 1    fluconazole 150 MG Oral Tab Take 1 tablet by mouth once. Repeat in 72 hours if symptoms persist. 2 tablet 0    Cholecalciferol (VITAMIN D3) 3000 UNITS Oral Tab Take 1 capsule by mouth daily.       Allergies   Allergen Reactions     Penicillins RASH    Sulfa Antibiotics RASH and SWELLING    Nsaids RASH     Ibuprofen, Aspirin    Pyrazodine [Phenazopyridine Hcl] RASH     Patient does not remember this medication     Current Outpatient Medications   Medication Sig Dispense Refill    levothyroxine 88 MCG Oral Tab Take 1 tablet Monday-Saturday and 1/2 tablet on Sunday 100 tablet 1    fluconazole 150 MG Oral Tab Take 1 tablet by mouth once. Repeat in 72 hours if symptoms persist. 2 tablet 0    Cholecalciferol (VITAMIN D3) 3000 UNITS Oral Tab Take 1 capsule by mouth daily.       Past Medical History:    Acute maxillary sinusitis    Arthritis    Atypical mole    Back pain    Bad breath    Bloating    Blood in urine    Change in hair    Chest pain    Chronic pain syndrome    Disorder of thyroid    Dysthymic disorder    Dysuria    Easy bruising    Fatigue    Flatulence/gas pain/belching    Frequent urination    Heartburn    Hemorrhoids    High cholesterol    Hoarseness, chronic    Indigestion    Irregular bowel habits    Leaking of urine    Lumbar herniated disc    Pain in joints    Problems with swallowing    Reflux esophagitis    Sleep disturbance    Sprain of lumbar region    Thyroid cancer (HCC)    Uncomfortable fullness after meals    Unspecified hypothyroidism    Unspecified otitis media    Unspecified psychophysiological malfunction    Unspecified vitamin D deficiency    Von Willebrand's disease (HCC)    Wears glasses    Weight gain    Wheezing     History reviewed. No pertinent family history.  Social history: Reviewed.      ROS/Exam    REVIEW OF SYSTEMS: Ten point review of systems has been performed and is otherwise negative and/or non-contributory, except as described above.     VITALS  Vitals:    04/15/25 1442   BP: 108/70   Pulse: 78   SpO2: 96%   Weight: 170 lb (77.1 kg)   Height: 5' 6\" (1.676 m)         Wt Readings from Last 6 Encounters:   04/15/25 170 lb (77.1 kg)   12/20/24 170 lb (77.1 kg)   12/17/24 170 lb (77.1 kg)   09/09/24 168 lb  (76.2 kg)   11/20/23 168 lb (76.2 kg)   08/28/23 170 lb (77.1 kg)       PHYSICAL EXAM  CONSTITUTIONAL:  awake, alert, cooperative, no apparent distress, and appears stated age    PSYCH: normal affect  LUNGS: breathing comfortably  CARDIOVASCULAR:  regular rate   NECK:  no palpable thyroid tissue, well healed lateral incision site     Labs/Imaging: Pertinent imaging reviewed.    Thyroid:    Lab Results   Component Value Date    TSH 0.266 (L) 04/09/2025    TSH 1.061 09/09/2024    TSH 0.854 01/24/2024    T4F 1.4 04/09/2025    T4F 1.5 09/09/2024    T4F 1.0 01/24/2024         Assessment & Plan:     ICD-10-CM    1. Postoperative hypothyroidism  E89.0 TSH and Free T4 [E]      2. Papillary thyroid carcinoma (HCC)  C73             Rosy Gray is a pleasant 59 year old female here for evaluation of:    #PTC  #Postsurgical Hypothyroidism PMHx of PTC diagnosed in 2008 s/p total thyroidectomy 12/2008 with Dr. Villa  Treatment history   Surgery:Total thyroidectomy 12/11/08, Dr. Villa, [facility]   Papillary thyroid CA, 1.0 cm in greatest dimension, confined to thyroid, negative margins, no LNs sampled  SURGERY (procedure/where/date):   Remnant ablation:100 mCi I131 1/20/09, post-therapy WBS not on file    Current LT4 dose 88 mcg  TSH goal 0.5-2.0  Surveillance History  Surveillance imaging: Thyroid US on file from [id number], last in 2021 without residual tissue or evidence of suspicious LA  Surveillance labs: TG and TG ab remain low, Tg 0.1 1/2023, TG less than 0.1 4/2025  Reduce levothyroxine to 88 mcg Monday through Saturday and half tablet Sunday   TFTs ordered for 6 months or sooner if patient is symptomatic.   Will continue routine surveillance with biochemical studies. Since patient without residual tissue on US in 2021, and we are currently marking around 16 years since initial diagnosis, will plan on obtaining US PRN if tg rising or patient is symptomatic         Return in about 6 months (around 10/15/2025).      4/15/2025  Deepthi Menard, DO    Note to patient: The 21 Century Cures Act makes medical notes like these available to patients in the interest of transparency. However, be advised this is a medical document. It is intended as peer to peer communication. It is written in medical language and may contain abbreviations or verbiage that are unfamiliar. It may appear blunt or direct. Medical documents are intended to carry relevant information, facts as evident, and the clinical opinion of the practitioner.

## 2025-04-18 ENCOUNTER — TELEPHONE (OUTPATIENT)
Dept: FAMILY MEDICINE CLINIC | Facility: CLINIC | Age: 60
End: 2025-04-18

## 2025-04-18 DIAGNOSIS — R79.0 LOW FERRITIN: Primary | ICD-10-CM

## 2025-04-18 NOTE — TELEPHONE ENCOUNTER
Pt tried to sched appt with Dr. Maher for iron infusion.  Office requesting an order.  Pls advise

## 2025-04-21 ENCOUNTER — TELEPHONE (OUTPATIENT)
Facility: LOCATION | Age: 60
End: 2025-04-21

## 2025-04-21 NOTE — TELEPHONE ENCOUNTER
Pt is calling to schedule a new consultation appt. If patient does not answer then reach out to daughter Lissa.    New Consult- Rosy Gray 09/21/1955  Referring to: Dr. Maher  Referral by: Dr. Ralph Baron PH.   Reason- Low Ferritin  Insurance- [employer] PPO (E-verified)  Referral: In Epic  Please give pt a call back. Thank you.

## 2025-04-24 ENCOUNTER — TELEPHONE (OUTPATIENT)
Dept: FAMILY MEDICINE CLINIC | Facility: CLINIC | Age: 60
End: 2025-04-24

## 2025-04-24 NOTE — TELEPHONE ENCOUNTER
Patient is trying to get in sooner for Dr. Maher and would like YP to help her get in sooner.     Pain in joints and muscles and brain on left side. The iron tablets aren't working. Please advise. Patient said to call her

## 2025-04-24 NOTE — TELEPHONE ENCOUNTER
Patient wishes to know if Dr Baron will recommend an alternate hematologist? She is hesitant to call Dr Maher's office again  due to scheduling issue and clerical staff behavior. States symptoms she described earlier are gradually subsiding. Denies visual disturbance.  No dizziness. No headache.  She has taken Tylenol PRN for now. Agreed to notify Dr Baron. Please advise.  Thanks.

## 2025-04-25 NOTE — TELEPHONE ENCOUNTER
She is not anemic and iron is not superlow.  The symptoms she is feeling is not from the low iron.  If she cannot get in with Dr. Maher she can try to get in with someone else in the group even the PA or NP over there.  But as this is not emergency I cannot get her in earlier.    She can follow-up with us to discuss her symptoms

## 2025-05-30 ENCOUNTER — OFFICE VISIT (OUTPATIENT)
Age: 60
End: 2025-05-30
Attending: INTERNAL MEDICINE
Payer: COMMERCIAL

## 2025-05-30 VITALS
BODY MASS INDEX: 27.64 KG/M2 | SYSTOLIC BLOOD PRESSURE: 98 MMHG | WEIGHT: 172 LBS | TEMPERATURE: 98 F | HEART RATE: 89 BPM | DIASTOLIC BLOOD PRESSURE: 66 MMHG | OXYGEN SATURATION: 96 % | HEIGHT: 65.98 IN | RESPIRATION RATE: 16 BRPM

## 2025-05-30 DIAGNOSIS — D68.00 VON WILLEBRAND DISEASE (HCC): ICD-10-CM

## 2025-05-30 DIAGNOSIS — E61.1 IRON DEFICIENCY: Primary | ICD-10-CM

## 2025-05-30 NOTE — PROGRESS NOTES
Patient here for new consult for iron deficiency. Patient's harman was 40 on 4/9/25. Patient took po iron with c/o upset stomach. Patient c/o fatigue, dizziness and dyspnea. Patient denies bleeding issues. Patient is accompanied by her daughter Lissa.     Education Record    Learner:  Patient and Family Member    Disease / Diagnosis: iron deficiency     Barriers / Limitations:  None   Comments:    Method:  Discussion   Comments:    General Topics:  Medication, Side effects and symptom management, and Plan of care reviewed   Comments:    Outcome:  Shows understanding   Comments:

## 2025-05-30 NOTE — CONSULTS
Cancer Center Report of Consultation    Patient Name: Rosy Gray   YOB: 1965   Medical Record Number: [id number]   [facility]: [id number]   Consulting Physician: Moses Maher MD  Referring Physician(s): No ref. provider found  Date of Consultation: 2025     Reason for Consultation:  Rosy Gray was seen today in the Cancer Center for evaluation and management of iron deficiency and fatigue.    History of Present Illness  Rosy Gray is a 59 year old female with von Willebrand's disease and thyroid cancer who presents with low iron levels and fatigue. She was referred by Dr. Baron for evaluation of low iron levels.    She has been experiencing significant fatigue for the past two years, describing it as feeling 'tired all the time'. She also experiences muscle pain, arthritis in her hands and shoulders, hair loss, and blurry vision, particularly when driving. Despite an eye exam indicating no change in her vision, these symptoms persist.    She has a history of low iron levels, with a recent ferritin level of 4 in 2025. She began taking over-the-counter iron tablets but discontinued them a week ago due to stomach discomfort. Her most recent hemoglobin level was 13.5 g/dL.    Her bowel movements are irregular, occurring every two to three days, and she tends to be constipated. A colonoscopy in 2023 revealed hemorrhoids and a benign polyp.    She has von Willebrand's disease, diagnosed by Dr. Stewart, but has not experienced significant bleeding issues, only bruising easily. She also has a history of thyroid cancer treated with surgery and radioactive iodine in , and she takes thyroid medication and vitamin D supplements.    Past Medical History:  Past Medical History[1]    Past Surgical History:  Past Surgical History[2]    Family Medical History:  Family History[3]    Gyne History:  OB History    Para Term  AB Living   4 4 4 0 0 4   SAB IAB Ectopic  Multiple Live Births   0 0 0 0 4       Psychosocial History:  Social History     Socioeconomic History    Marital status:      Spouse name: Not on file    Number of children: Not on file    Years of education: Not on file    Highest education level: Not on file   Occupational History    Not on file   Tobacco Use    Smoking status: Never    Smokeless tobacco: Never   Vaping Use    Vaping status: Never Used   Substance and Sexual Activity    Alcohol use: No     Alcohol/week: 0.0 standard drinks of alcohol    Drug use: No    Sexual activity: Not Currently     Partners: Male   Other Topics Concern     Service Not Asked    Blood Transfusions Not Asked    Caffeine Concern Yes    Occupational Exposure Not Asked    Hobby Hazards Not Asked    Sleep Concern Not Asked    Stress Concern Not Asked    Weight Concern Not Asked    Special Diet Not Asked    Back Care Not Asked    Exercise Yes    Bike Helmet Not Asked    Seat Belt Not Asked    Self-Exams Not Asked   Social History Narrative    Not on file     Social Drivers of Health     Food Insecurity: Not on file   Transportation Needs: Not on file   Housing Stability: Not on file       Allergies:   Allergies[4]    Current Medications:  Medications - Current[5]    Review of Systems:      Constitutional: Negative for anorexia, fevers, chills, night sweats and weight loss.  Eyes: Negative for visual disturbance, irritation and redness.  Respiratory: Negative for cough, hemoptysis, chest pain, or dyspnea.  Cardiovascular: Negative for angina, orthopnea or palpitations.  Gastrointestinal: Negative for nausea, vomiting, change in bowel habits, diarrhea, constipation and abdominal pain.  Integument/breast: Negative for rash, skin lesions, and pruritus.  Hematologic/lymphatic: Negative for easy bruising, bleeding, and lymphadenopathy.  Musculoskeletal: Negative for myalgias, arthralgias, muscle weakness.  Genitourinary: Negative for dysuria or hematuria  Neurological:  Negative for headaches, dizziness, speech problems, gait problems and focal weakness.  Psychiatric: The patient's mood was calm and appropriate for this visit.    The pertinent positives and negatives were described in the HPI and above. All other systems were negative.      Vital Signs:  Height: 167.6 cm (5' 5.98\") (05/30 1117)  Weight: 78 kg (172 lb) (05/30 1117)  BSA (Calculated - sq m): 1.88 sq meters (05/30 1117)  Pulse: 89 (05/30 1117)  BP: 98/66 (05/30 1117)  Temp: 98.4 °F (36.9 °C) (05/30 1117)  Do Not Use - Resp Rate: --  SpO2: 96 % (05/30 1117)    Physical Examination:    Constitutional: Patient is alert and oriented x 3, not in acute distress.  HEENT:  Oropharynx is clear. Neck is supple.  Eyes: Anicteric sclera. Pink conjunctiva.  Respiratory: Clear to auscultation and percussion. No rales.  No wheezes.  Cardiovascular: Regular rate and rhythm.   Gastrointestinal: Soft, non tender with good bowel sounds.  Extremities: No edema. No calf tenderness.  Neurological: Grossly intact without focal motor or sensory deficit.  Lymphatics: There is no palpable lymphadenopathy throughout in the cervical, supraclavicular, or axillary regions.    Labs reviewed at this visit:  Lab Results   Component Value Date    WBC 6.0 04/09/2025    RBC 4.61 04/09/2025    HGB 13.5 04/09/2025    HCT 40.0 04/09/2025    MCV 86.8 04/09/2025    MCH 29.3 04/09/2025    MCHC 33.8 04/09/2025    RDW 13.4 04/09/2025    .0 04/09/2025    MPV 10.3 02/13/2013     Lab Results   Component Value Date     12/17/2024    K 4.0 12/17/2024     12/17/2024    CO2 28.0 12/17/2024    BUN 17 12/17/2024    CREATSERUM 0.77 12/17/2024    GLU 88 12/17/2024    CA 9.6 12/17/2024    ALKPHO 70 12/17/2024    ALT 24 12/17/2024    AST 27 12/17/2024    BILT 0.6 12/17/2024    ALB 4.3 12/17/2024    TP 7.4 12/17/2024       Results  LABS  WBC: 6.0 (04/09/2025)  WBC: 4.4 (11/2023)  Hb: 13.5 (04/09/2025)  Hb: 13.5 (2023)  Hb: 13.0 (2022)  PLT: 264  (04/09/2025)  Ferritin: 4 (04/2025)  Ferritin: 37 (2024)  Ferritin: 24 (2017)  Ferritin: 24 (2016)  Ferritin: 17 (2013)  Ferritin: 16 (2013)  Ferritin: 6 (2011)  Ferritin: 5 (2012)    DIAGNOSTIC  Colonoscopy: Hemorrhoids, normal mucosa, polyp removed (06/2023)  EGD: Normal esophagus, normal stomach, normal duodenum, polyp in stomach biopsied (06/2023)    PATHOLOGY  Duodenum biopsy: Normal (06/2023)  Stomach polyp biopsy: Benign, normal mucosa (06/2023)  Colonic polyp biopsy: Tubular adenoma (06/2023)         Assessment & Plan  Iron deficiency  Iron deficiency with ferritin level of 4, indicating low storage iron. Hemoglobin levels are normal at 13.5, suggesting no anemia. Symptoms include fatigue, muscle pain, and blurry vision, though blurry vision is unlikely related to iron deficiency. Heavy menstrual bleeding may have contributed to past iron deficiency. Current iron levels are improving, suggesting no ongoing blood loss. Intravenous iron replacement is considered due to intolerance to oral iron and ferritin level below 50. Intravenous iron can provide a dose equivalent to 3-6 months of oral iron without gastrointestinal side effects. If iron levels decrease after treatment, further investigation into potential blood loss will be necessary.  - Administer intravenous iron (Infed 500 mg)  - Check ferritin level in 3 months  - Check CBC in 3 months    Von Willebrand disease  Von Willebrand disease with bruising but no significant bleeding issues. Potential for increased menstrual bleeding due to von Willebrand disease, which may have contributed to past iron deficiency.    History of thyroid cancer  Thyroid cancer treated with surgery and radioactive iodine in 2008.         The following individual(s) verbally consented to be recorded using ambient AI listening technology and understand that they can each withdraw their consent to this listening technology at any point by asking the clinician to turn off or pause  the recording:    Patient name: Rosy Gray  Additional names:  Lissa Gray        Moses Maher MD        [1]   Past Medical History:   Acute maxillary sinusitis    Arthritis    Atypical mole    Back pain    Chest pain    Dysthymic disorder    Easy bruising    Hemorrhoids    High cholesterol    Lumbar herniated disc    Reflux esophagitis    Sprain of lumbar region    Thyroid cancer (HCC)    Unspecified hypothyroidism    Unspecified otitis media    Unspecified psychophysiological malfunction    Unspecified vitamin D deficiency    Von Willebrand's disease (HCC)    Wears glasses    Weight gain    Wheezing   [2]   Past Surgical History:  Procedure Laterality Date    Fluor gid & loclzj ndl/cath spi dx/ther njx N/A 01/11/2016    Procedure: LUMBAR EPIDURAL;  Surgeon: Todd Anand MD;  Location: [facility]    Fluor gid & loclzj ndl/cath spi dx/ther njx N/A 02/01/2016    Procedure: LUMBAR EPIDURAL;  Surgeon: Todd Anand MD;  Location: [facility]    Injection, anesthetic/steroid, transforaminal epidural; lumbar/sacral, single level Right 12/23/2015    Procedure: TRANSFORAMINAL EPIDURAL - LUMBAR;  Surgeon: Todd Anand MD;  Location: [facility]    Injection, w/wo contrast, dx/therapeutic substance, epidural/subarachnoid; lumbar/sacral N/A 01/11/2016    Procedure: LUMBAR EPIDURAL;  Surgeon: Todd Anand MD;  Location: [facility]    Injection, w/wo contrast, dx/therapeutic substance, epidural/subarachnoid; lumbar/sacral N/A 02/01/2016    Procedure: LUMBAR EPIDURAL;  Surgeon: Todd Anand MD;  Location: [facility]    M-sedaj by  phys perfrmg svc 5+ yr Right 12/23/2015    Procedure: TRANSFORAMINAL EPIDURAL - LUMBAR;  Surgeon: Todd Anand MD;  Location: [facility]    Needle biopsy right Right 12/2011    US guided bx  benign    Thyroidectomy  2008   [3]   Family History  Problem  Relation Age of Onset    Heart Disease Father    [4]   Allergies  Allergen Reactions    Penicillins RASH    Sulfa Antibiotics RASH and SWELLING    Nsaids RASH     Ibuprofen, Aspirin    Pyrazodine [Phenazopyridine Hcl] RASH     Patient does not remember this medication   [5]   Current Outpatient Medications:     levothyroxine 88 MCG Oral Tab, Take 1 tablet Monday-Saturday and 1/2 tablet on Sunday, Disp: 100 tablet, Rfl: 1    Cholecalciferol (VITAMIN D3) 3000 UNITS Oral Tab, Take 1 capsule by mouth in the morning., Disp: , Rfl:

## 2025-06-02 ENCOUNTER — OFFICE VISIT (OUTPATIENT)
Dept: FAMILY MEDICINE CLINIC | Facility: CLINIC | Age: 60
End: 2025-06-02
Payer: COMMERCIAL

## 2025-06-02 ENCOUNTER — LAB ENCOUNTER (OUTPATIENT)
Dept: LAB | Age: 60
End: 2025-06-02
Attending: FAMILY MEDICINE
Payer: COMMERCIAL

## 2025-06-02 VITALS
RESPIRATION RATE: 14 BRPM | DIASTOLIC BLOOD PRESSURE: 74 MMHG | BODY MASS INDEX: 27.48 KG/M2 | WEIGHT: 171 LBS | OXYGEN SATURATION: 98 % | SYSTOLIC BLOOD PRESSURE: 106 MMHG | HEART RATE: 78 BPM | HEIGHT: 66 IN

## 2025-06-02 DIAGNOSIS — M79.671 PAIN OF BOTH HEELS: ICD-10-CM

## 2025-06-02 DIAGNOSIS — M79.672 PAIN OF BOTH HEELS: ICD-10-CM

## 2025-06-02 DIAGNOSIS — Z00.00 GENERAL MEDICAL EXAM: ICD-10-CM

## 2025-06-02 DIAGNOSIS — M72.2 PLANTAR FASCIITIS: ICD-10-CM

## 2025-06-02 DIAGNOSIS — G89.29 CHRONIC PAIN OF BOTH ANKLES: ICD-10-CM

## 2025-06-02 DIAGNOSIS — M26.623 BILATERAL TEMPOROMANDIBULAR JOINT PAIN: ICD-10-CM

## 2025-06-02 DIAGNOSIS — M79.671 PAIN OF BOTH HEELS: Primary | ICD-10-CM

## 2025-06-02 DIAGNOSIS — M79.672 PAIN OF BOTH HEELS: Primary | ICD-10-CM

## 2025-06-02 DIAGNOSIS — M54.2 NECK PAIN: ICD-10-CM

## 2025-06-02 DIAGNOSIS — M25.572 CHRONIC PAIN OF BOTH ANKLES: ICD-10-CM

## 2025-06-02 DIAGNOSIS — M25.571 CHRONIC PAIN OF BOTH ANKLES: ICD-10-CM

## 2025-06-02 DIAGNOSIS — H92.03 OTALGIA OF BOTH EARS: ICD-10-CM

## 2025-06-02 DIAGNOSIS — R30.0 DYSURIA: ICD-10-CM

## 2025-06-02 DIAGNOSIS — M54.50 LUMBAR PAIN: ICD-10-CM

## 2025-06-02 LAB
ALBUMIN SERPL-MCNC: 4.6 G/DL (ref 3.2–4.8)
ALBUMIN/GLOB SERPL: 1.5 {RATIO} (ref 1–2)
ALP LIVER SERPL-CCNC: 72 U/L (ref 46–118)
ALT SERPL-CCNC: 21 U/L (ref 10–49)
ANION GAP SERPL CALC-SCNC: 8 MMOL/L (ref 0–18)
AST SERPL-CCNC: 25 U/L (ref ?–34)
BASOPHILS # BLD AUTO: 0.05 X10(3) UL (ref 0–0.2)
BASOPHILS NFR BLD AUTO: 0.8 %
BILIRUB SERPL-MCNC: 0.5 MG/DL (ref 0.2–1.1)
BILIRUB UR QL STRIP.AUTO: NEGATIVE
BUN BLD-MCNC: 15 MG/DL (ref 9–23)
CALCIUM BLD-MCNC: 9.4 MG/DL (ref 8.7–10.6)
CHLORIDE SERPL-SCNC: 103 MMOL/L (ref 98–112)
CLARITY UR REFRACT.AUTO: CLEAR
CO2 SERPL-SCNC: 29 MMOL/L (ref 21–32)
CREAT BLD-MCNC: 0.88 MG/DL (ref 0.55–1.02)
EGFRCR SERPLBLD CKD-EPI 2021: 75 ML/MIN/1.73M2 (ref 60–?)
EOSINOPHIL # BLD AUTO: 0.15 X10(3) UL (ref 0–0.7)
EOSINOPHIL NFR BLD AUTO: 2.3 %
ERYTHROCYTE [DISTWIDTH] IN BLOOD BY AUTOMATED COUNT: 13.7 %
EST. AVERAGE GLUCOSE BLD GHB EST-MCNC: 123 MG/DL (ref 68–126)
FASTING STATUS PATIENT QL REPORTED: NO
GLOBULIN PLAS-MCNC: 3 G/DL (ref 2–3.5)
GLUCOSE BLD-MCNC: 89 MG/DL (ref 70–99)
GLUCOSE UR STRIP.AUTO-MCNC: NORMAL MG/DL
HBA1C MFR BLD: 5.9 % (ref ?–5.7)
HCT VFR BLD AUTO: 39 % (ref 35–48)
HGB BLD-MCNC: 13.3 G/DL (ref 12–16)
IMM GRANULOCYTES # BLD AUTO: 0.01 X10(3) UL (ref 0–1)
IMM GRANULOCYTES NFR BLD: 0.2 %
KETONES UR STRIP.AUTO-MCNC: NEGATIVE MG/DL
LEUKOCYTE ESTERASE UR QL STRIP.AUTO: 75
LYMPHOCYTES # BLD AUTO: 2.7 X10(3) UL (ref 1–4)
LYMPHOCYTES NFR BLD AUTO: 42.3 %
MCH RBC QN AUTO: 29.6 PG (ref 26–34)
MCHC RBC AUTO-ENTMCNC: 34.1 G/DL (ref 31–37)
MCV RBC AUTO: 86.9 FL (ref 80–100)
MONOCYTES # BLD AUTO: 0.48 X10(3) UL (ref 0.1–1)
MONOCYTES NFR BLD AUTO: 7.5 %
NEUTROPHILS # BLD AUTO: 3 X10 (3) UL (ref 1.5–7.7)
NEUTROPHILS # BLD AUTO: 3 X10(3) UL (ref 1.5–7.7)
NEUTROPHILS NFR BLD AUTO: 46.9 %
NITRITE UR QL STRIP.AUTO: NEGATIVE
OSMOLALITY SERPL CALC.SUM OF ELEC: 290 MOSM/KG (ref 275–295)
PH UR STRIP.AUTO: 5 [PH] (ref 5–8)
PLATELET # BLD AUTO: 253 10(3)UL (ref 150–450)
POTASSIUM SERPL-SCNC: 4.2 MMOL/L (ref 3.5–5.1)
PROT SERPL-MCNC: 7.6 G/DL (ref 5.7–8.2)
PROT UR STRIP.AUTO-MCNC: NEGATIVE MG/DL
RBC # BLD AUTO: 4.49 X10(6)UL (ref 3.8–5.3)
RBC UR QL AUTO: NEGATIVE
SODIUM SERPL-SCNC: 140 MMOL/L (ref 136–145)
SP GR UR STRIP.AUTO: 1.02 (ref 1–1.03)
URATE SERPL-MCNC: 6.7 MG/DL (ref 3.1–7.8)
UROBILINOGEN UR STRIP.AUTO-MCNC: NORMAL MG/DL
WBC # BLD AUTO: 6.4 X10(3) UL (ref 4–11)

## 2025-06-02 PROCEDURE — 83036 HEMOGLOBIN GLYCOSYLATED A1C: CPT | Performed by: FAMILY MEDICINE

## 2025-06-02 PROCEDURE — 80053 COMPREHEN METABOLIC PANEL: CPT | Performed by: FAMILY MEDICINE

## 2025-06-02 PROCEDURE — 81001 URINALYSIS AUTO W/SCOPE: CPT | Performed by: FAMILY MEDICINE

## 2025-06-02 PROCEDURE — 99215 OFFICE O/P EST HI 40 MIN: CPT | Performed by: FAMILY MEDICINE

## 2025-06-02 PROCEDURE — 85025 COMPLETE CBC W/AUTO DIFF WBC: CPT | Performed by: FAMILY MEDICINE

## 2025-06-02 PROCEDURE — 3078F DIAST BP <80 MM HG: CPT | Performed by: FAMILY MEDICINE

## 2025-06-02 PROCEDURE — 87086 URINE CULTURE/COLONY COUNT: CPT | Performed by: FAMILY MEDICINE

## 2025-06-02 PROCEDURE — 84550 ASSAY OF BLOOD/URIC ACID: CPT | Performed by: FAMILY MEDICINE

## 2025-06-02 PROCEDURE — 3008F BODY MASS INDEX DOCD: CPT | Performed by: FAMILY MEDICINE

## 2025-06-02 PROCEDURE — 3074F SYST BP LT 130 MM HG: CPT | Performed by: FAMILY MEDICINE

## 2025-06-02 RX ORDER — FLUTICASONE PROPIONATE 50 MCG
2 SPRAY, SUSPENSION (ML) NASAL NIGHTLY
Qty: 3 EACH | Refills: 0 | Status: SHIPPED
Start: 2025-06-02

## 2025-06-02 RX ORDER — FLUTICASONE PROPIONATE 50 MCG
2 SPRAY, SUSPENSION (ML) NASAL NIGHTLY
Qty: 3 EACH | Refills: 0 | Status: SHIPPED | OUTPATIENT
Start: 2025-06-02

## 2025-06-02 NOTE — PROGRESS NOTES
HPI:          The following individual(s) verbally consented to be recorded using ambient AI listening technology and understand that they can each withdraw their consent to this listening technology at any point by asking the clinician to turn off or pause the recording:    Patient name: Rosy Gray  Additional names:  daughter        Rosy Gray is a 59 year old female here with    History of Present Illness  Rosy Gray is a 59 year old female who presents with bilateral heel pain and burning urination.    She has been experiencing bilateral heel pain for the past month, primarily located in the heel area. The pain is most severe in the morning, making ambulation difficult, but improves as the day progresses. There is no radiation of pain towards the toes, and she has not experienced this pain before. There is no history of injury or fall, and she is not taking any over-the-counter medications for this issue. She usually wears shoes with good arch support.    She reports burning during urination and occasional pain in the kidney area. She is concerned about her uric acid levels and potential kidney issues, although she denies a family history of gout and has not had kidney stones in the past. The burning sensation is intermittent rather than chronic.    She experiences blurry vision and a sensation of heaviness and pain in the ear area, which she attributes to her ears. This sensation starts in the ear and moves inside her head, improving after a few days. No facial pressure, cough, congestion, or fever. She has been informed by her dentist that she grinds her teeth, although she does not wake up with a sore jaw.    She engages in regular physical activity, including walking and household chores, but does not follow a structured exercise routine. She has not noticed any recent weight gain and maintains a weight of around 170-172 pounds. She experiences dry eyes and mouth upon waking and is  concerned about her sugar levels. Her last cholesterol check in December showed a slightly elevated LDL level of 125.        Current Outpatient Medications   Medication Sig Dispense Refill    fluticasone propionate 50 MCG/ACT Nasal Suspension 2 sprays by Nasal route nightly. 3 each 0    fluticasone propionate 50 MCG/ACT Nasal Suspension 2 sprays by Nasal route nightly. 3 each 0    levothyroxine 88 MCG Oral Tab Take 1 tablet Monday-Saturday and 1/2 tablet on Sunday 100 tablet 1    Cholecalciferol (VITAMIN D3) 3000 UNITS Oral Tab Take 1 capsule by mouth in the morning.        Past Medical History:    Acute maxillary sinusitis    Arthritis    Atypical mole    Back pain    Chest pain    Dysthymic disorder    Easy bruising    Hemorrhoids    High cholesterol    Lumbar herniated disc    Reflux esophagitis    Sprain of lumbar region    Thyroid cancer (HCC)    Unspecified hypothyroidism    Unspecified otitis media    Unspecified psychophysiological malfunction    Unspecified vitamin D deficiency    Von Willebrand's disease (HCC)    Wears glasses    Weight gain    Wheezing      Past Surgical History:   Procedure Laterality Date    Fluor gid & loclzj ndl/cath spi dx/ther njx N/A 01/11/2016    Procedure: LUMBAR EPIDURAL;  Surgeon: Todd Anand MD;  Location: [facility]    Fluor gid & loclzj ndl/cath spi dx/ther njx N/A 02/01/2016    Procedure: LUMBAR EPIDURAL;  Surgeon: Todd Anand MD;  Location: [facility]    Injection, anesthetic/steroid, transforaminal epidural; lumbar/sacral, single level Right 12/23/2015    Procedure: TRANSFORAMINAL EPIDURAL - LUMBAR;  Surgeon: Todd Anand MD;  Location: [facility]    Injection, w/wo contrast, dx/therapeutic substance, epidural/subarachnoid; lumbar/sacral N/A 01/11/2016    Procedure: LUMBAR EPIDURAL;  Surgeon: Todd Anand MD;  Location: [facility]    Injection, w/wo contrast, dx/therapeutic  substance, epidural/subarachnoid; lumbar/sacral N/A 02/01/2016    Procedure: LUMBAR EPIDURAL;  Surgeon: Todd Anand MD;  Location: [facility]    M-sedaj by  phys perfrmg svc 5+ yr Right 12/23/2015    Procedure: TRANSFORAMINAL EPIDURAL - LUMBAR;  Surgeon: Todd Anand MD;  Location: [facility]    Needle biopsy right Right 12/2011    US guided bx  benign    Thyroidectomy  2008      Family History   Problem Relation Age of Onset    Heart Disease Father       Social History:   Social History     Socioeconomic History    Marital status:    Tobacco Use    Smoking status: Never    Smokeless tobacco: Never   Vaping Use    Vaping status: Never Used   Substance and Sexual Activity    Alcohol use: No     Alcohol/week: 0.0 standard drinks of alcohol    Drug use: No    Sexual activity: Not Currently     Partners: Male   Other Topics Concern    Caffeine Concern Yes    Exercise Yes     Occ: . : 4. Children: .   Homemaker   Exercise: minimal.  Diet: watches minimally     REVIEW OF SYSTEMS:   GENERAL: feels well otherwise  SKIN: denies any unusual skin lesions  EYES:denies blurred vision or double vision  HEENT: denies nasal congestion, sinus pain or ST  LUNGS: denies shortness of breath with exertion  CARDIOVASCULAR: denies chest pain on exertion  GI: + abdominal pain  NEURO: denies headaches  PSYCHE: denies depression or anxiety  HEMATOLOGIC: denies hx of anemia  ENDOCRINE:h/o thyroid cancer   ALL/ASTHMA: denies  asthma    EXAM:   /74   Pulse 78   Resp 14   Ht 5' 6\" (1.676 m)   Wt 171 lb (77.6 kg)   SpO2 98%   BMI 27.60 kg/m²   Body mass index is 27.6 kg/m².   GENERAL: alert and oriented X 3, well developed, well nourished,in no apparent distress  SKIN: norashes,no suspicious lesions  EXTREMITIES: no cyanosis, clubbing or edema  HEART: normal   LUNGS: clear  NECK: supple no LAD   HEENT: TM's effusion no redness, OP clear, + TMJ  NEURO: cranial nerves are  intact,motor and sensory are grossly intact  ABD: no pain, no RRG, no HSM    Physical Exam  MEASUREMENTS: Weight- 70-72 kg.  HEENT: Fluid on eardrum. Sinus tenderness on palpation.  MUSCULOSKELETAL: Jaw clenching. Anterior neck muscle tenderness on palpation. Swelling on foot with more swelling on left ankle than right.        ASSESSMENT AND PLAN:   Rosy Gray is a 59 year old female who presents with     Assessment & Plan  Plantar fasciitis  Bilateral heel pain, worse in the morning, improving throughout the day. No injury history. Likely plantar fasciitis, possibly aggravated by footwear or weight changes.  - Order foot x-rays to assess for plantar fasciitis and arthritis.  - Advise wearing supportive shoes with good arch support.    Arthritis of the ankle  Swelling and pain in the left ankle, more than the right. Possible arthritis indicated by symptoms.  - Order ankle x-rays to assess for arthritis.    Jaw clenching and related pain  Jaw, temple, and ear pain likely due to jaw clenching and teeth grinding, supported by dental findings and muscle tenderness.  - Refer to physical therapy for jaw and neck muscle pain management.  - Prescribe Flonase for potential allergy-related ear symptoms.    Burning urination  Intermittent burning sensation during urination. Differential includes urinary tract infection or low estrogen levels causing urethral irritation.  - Obtain urine sample for urinalysis to check for infection.  - Consider prescribing estrogen cream if urinalysis is normal and symptoms persist.    Blurry vision  Intermittent blurry vision despite appropriate glasses. Possible dehydration or systemic issues. Blood sugar levels will help rule out hyperglycemia.  - Check blood sugar levels to rule out hyperglycemia.  - Encourage adequate hydration.      1. Pain of both heels    - Uric Acid [905][Q]; Future  - XR HEEL (CALCANEUS) (MIN 2 VIEWS), RIGHT (CPT=73650); Future  - XR HEEL (CALCANEUS) (MIN 2  VIEWS), LEFT (CPT=73650); Future    2. Chronic pain of both ankles    - Uric Acid [905][Q]; Future  - XR ANKLE (MIN 3 VIEWS), RIGHT (CPT=73610); Future  - XR ANKLE (MIN 3 VIEWS), LEFT (CPT=73610); Future    3. Dysuria    - UA/M WITH CULTURE REFLEX [3020]; Future  - UA/M WITH CULTURE REFLEX [3020]  - Urine Culture, Routine    4. Otalgia of both ears    - fluticasone propionate 50 MCG/ACT Nasal Suspension; 2 sprays by Nasal route nightly.  Dispense: 3 each; Refill: 0  - fluticasone propionate 50 MCG/ACT Nasal Suspension; 2 sprays by Nasal route nightly.  Dispense: 3 each; Refill: 0    5. Bilateral temporomandibular joint pain    - Physical Therapy Referral - [facility]    6. Plantar fasciitis    - Physical Therapy Referral - [facility]    7. Lumbar pain    - Physical Therapy Referral - [facility]    8. General medical exam    - Comp Metabolic Panel (14); Future  - CBC With Differential With Platelet; Future  - Hemoglobin A1C; Future    9. Neck pain    - Physical Therapy Referral - [facility]      Questions answered and patient indicates understanding of these issues and agrees to the plan.  Follow up in 3 mo or sooner if needed.

## 2025-06-09 ENCOUNTER — APPOINTMENT (OUTPATIENT)
Age: 60
End: 2025-06-09
Attending: INTERNAL MEDICINE
Payer: COMMERCIAL

## 2025-06-10 ENCOUNTER — APPOINTMENT (OUTPATIENT)
Age: 60
End: 2025-06-10
Attending: INTERNAL MEDICINE
Payer: COMMERCIAL

## 2025-06-16 ENCOUNTER — APPOINTMENT (OUTPATIENT)
Age: 60
End: 2025-06-16
Attending: INTERNAL MEDICINE
Payer: COMMERCIAL

## 2025-06-18 ENCOUNTER — HOSPITAL ENCOUNTER (OUTPATIENT)
Dept: GENERAL RADIOLOGY | Age: 60
Discharge: HOME | End: 2025-06-18
Attending: FAMILY MEDICINE
Payer: COMMERCIAL

## 2025-06-18 DIAGNOSIS — G89.29 CHRONIC PAIN OF BOTH ANKLES: ICD-10-CM

## 2025-06-18 DIAGNOSIS — M79.671 PAIN OF BOTH HEELS: ICD-10-CM

## 2025-06-18 DIAGNOSIS — M25.572 CHRONIC PAIN OF BOTH ANKLES: ICD-10-CM

## 2025-06-18 DIAGNOSIS — M79.672 PAIN OF BOTH HEELS: ICD-10-CM

## 2025-06-18 DIAGNOSIS — M25.571 CHRONIC PAIN OF BOTH ANKLES: ICD-10-CM

## 2025-06-18 PROCEDURE — 73610 X-RAY EXAM OF ANKLE: CPT | Performed by: FAMILY MEDICINE

## 2025-06-18 PROCEDURE — 73650 X-RAY EXAM OF HEEL: CPT | Performed by: FAMILY MEDICINE

## 2025-06-27 ENCOUNTER — OFFICE VISIT (OUTPATIENT)
Age: 60
End: 2025-06-27
Attending: INTERNAL MEDICINE
Payer: COMMERCIAL

## 2025-06-27 VITALS
HEIGHT: 65.98 IN | SYSTOLIC BLOOD PRESSURE: 105 MMHG | DIASTOLIC BLOOD PRESSURE: 61 MMHG | OXYGEN SATURATION: 98 % | WEIGHT: 172 LBS | RESPIRATION RATE: 16 BRPM | BODY MASS INDEX: 27.64 KG/M2 | TEMPERATURE: 98 F | HEART RATE: 70 BPM

## 2025-06-27 DIAGNOSIS — E61.1 IRON DEFICIENCY: Primary | ICD-10-CM

## 2025-06-27 NOTE — PROGRESS NOTES
Education Record    Learner:  Patient    Disease / Diagnosis: iron deficiency    Barriers / Limitations:  None   Comments:    Method:  Brief focused   Comments:    General Topics:  Plan of care reviewed   Comments:    Outcome:  Shows understanding   Comments:    Pt received test dose and remaining infed without complication. Pt discharged in stable condition.

## 2025-07-23 ENCOUNTER — OFFICE VISIT (OUTPATIENT)
Dept: PODIATRY CLINIC | Facility: CLINIC | Age: 60
End: 2025-07-23
Payer: COMMERCIAL

## 2025-07-23 DIAGNOSIS — M76.821 POSTERIOR TIBIAL TENDONITIS, RIGHT: ICD-10-CM

## 2025-07-23 DIAGNOSIS — M79.671 RIGHT FOOT PAIN: ICD-10-CM

## 2025-07-23 DIAGNOSIS — M72.2 PLANTAR FASCIITIS OF RIGHT FOOT: Primary | ICD-10-CM

## 2025-07-23 PROCEDURE — 99203 OFFICE O/P NEW LOW 30 MIN: CPT | Performed by: STUDENT IN AN ORGANIZED HEALTH CARE EDUCATION/TRAINING PROGRAM

## 2025-07-23 RX ORDER — METHYLPREDNISOLONE 4 MG/1
TABLET ORAL
Qty: 21 TABLET | Refills: 0 | Status: SHIPPED | OUTPATIENT
Start: 2025-07-23

## 2025-08-28 DIAGNOSIS — H92.03 OTALGIA OF BOTH EARS: ICD-10-CM

## 2025-08-28 RX ORDER — FLUTICASONE PROPIONATE 50 MCG
2 SPRAY, SUSPENSION (ML) NASAL NIGHTLY
Qty: 48 ML | Refills: 0 | Status: SHIPPED | OUTPATIENT
Start: 2025-08-28

## (undated) NOTE — LETTER
Patient Name: Sunitha Angel  YOB: 1965        MRN number:  [id number]  Date:  11/9/2017  Referring Physician:  Fabrizio Heredia     SPINE EVALUATION: Referring Physician: Dr. Xavier Saba  Diagnosis: Neck pain     Date of Service: 11/9/2017     PATIENT S Neuro Screen: UE dermatomes: WNL; UE myotomes:  WNL    cervical ROM:   Flexion: 50 deg, pain  Extension: 50 deg, discomfort  Sidebending: R 25 deg, pain L; L 25 deg, pain R  Rotation: R 50 deg, pain L; L 60 deg, pain R    Accessory motion: cervical spine: p cervical traction unit to manage her condition long term in conjunction with postural exercises.      Education or treatment limitation: None;  Rehab Potential:good    Patient/Family/Caregiver was advised of these findings, precautions, and treatment option

## (undated) NOTE — LETTER
Patient Name: Mary Quinonez  YOB: 1965          MRN number:  [id number]  Date:  3/7/2019  Referring Physician:  Dylan Montes     Discharge Summary    Pt has attended 10 visits in Physical Therapy.  Amatul reports feeling 70-80% improve · Pt will demonstrate good understanding of proper posture and body mechanics to decrease pain and improve spinal safety. Met   · Pt will improve lumbar spine AROM flexion to >70 deg to allow increase ease with bending forward to don shoes.  Met    · Pt rosa

## (undated) NOTE — LETTER
10/08/20        Rosy [place] [id number]      Dear Mary He records indicate that you have outstanding lab work and or testing that was ordered for you and has not yet been completed:  Orders Placed This En

## (undated) NOTE — LETTER
Your patient was recently seen at the [facility] for a hospital follow-up visit. The visit note is attached. Please contact the clinic with any questions at [phone].     Thank you,  NATIVIDAD Corbin

## (undated) NOTE — MR AVS SNAPSHOT
[address]  [address] [facility], [address] [address] [phone]               Thank you for choosing us for your health care visit with Cali Carrasquillo.   We are glad to serve you and happy to provide you with this XR FOOT, COMPLETE (MIN 3 VIEWS), LEFT (CPT=73630)    Complete by: Mar 06, 2017 (Approximate)    Assoc Dx:  Pain in both feet [M79.671, M79.672]           MITZI IFA, W/REFL TO TITER/PATTERN/CASCADE [15062] [Q]    Complete by:   Mar 06, 2017 (Approximate) Instructions and Information about Your Health     None      Allergies as of Mar 06, 2017     Naproxen Rash    Nsaids Rash    Penicillins Rash    Pyrazodine [Phenazopyridine Hcl] Rash    Ra Ibuprofen Rash    Salicylates     unsure    Sulfa Antibiotics Rash

## (undated) NOTE — Clinical Note
ASTHMA ACTION PLAN for Rosy Romero     : 1965     Date: 3/6/2017  Provider:  NATIVIDAD Snowden  Phone for doctor or clinic: [address] [place] [address] [address] [place] [address]  [phone]

## (undated) NOTE — MR AVS SNAPSHOT
[address]  [address] [place] [id number] [phone]               Thank you for choosing us for your health care visit with Chente Pulido DO.   We are glad to serve you and happy to provide you with this arias This list is accurate as of: 3/22/17  1:34 PM.  Always use your most recent med list.                Esomeprazole Magnesium 20 MG Cpdr   TAKE 1 CAPSULE BY MOUTH DAILY   Commonly known as:  NEXIUM           Levothyroxine Sodium 88 MCG Tabs   Take 1 tablet (

## (undated) NOTE — MR AVS SNAPSHOT
[address]  [address] [facility], [address] [address] [phone]               Thank you for choosing us for your health care visit with Cali Mccoy.   We are glad to serve you and happy to provide you with this Diagnoses:  Breast pain   Order:  Surgery - Internal    Annemarie Monreal MD   [address] [address]   Phone:  [phone]   Fax:  [phone]         Referral Orders      Normal Orders This Visit    ENT - INTERNAL [[id number] Erik Mar Reason for Today's Visit     Swollen Glands     Breast Pain           Medical Issues Discussed Today     Lymphadenopathy of head and neck    -  Primary    Breast pain        Postmenopausal bleeding        Tinnitus, bilateral        Vaginal discharge - Nitrofurantoin Monohyd Macro 100 MG Caps            MyChart     Call the helpdesk for assistance with your inactive MyChart account    If you have questions, you can call [phone] to talk to our [facility] Staff.  Remember, MyChart is NOT to be

## (undated) NOTE — LETTER
Patient Name: Bernie Kan  YOB: 1965          MRN number:  [id number]  Date:  1/15/2019  Referring Physician:  No ref. provider found          SPINE EVALUATION:    Referring Physician: Dr. Betancourt ref.  provider found  Diagnosis: [address] Past medical history was reviewed with Rosy. Significant findings include thyroid Ca (2007), hypothyroid    ASSESSMENT  Rosy complains of acute on chronic neck, mid back and lower back pain limiting her ability to sit, stand, drive, sleep and lift.  She EV: R/L: 5/5     Flexibility:   UE/Scapular LE   Upper Trap: R/L: Moderate restrictions  Pec Major: R/L: Moderate restrictions Hip Flexor: RWNL, L WNL  Hamstrings: R 70 deg, pain; L 65 deg,pain  Piriformis: R Moderate restrictions; L WNL  Gastroc-soleus: R · Pt will be independent and compliant with comprehensive HEP to maintain progress achieved in PT     Frequency / Duration: Patient will be seen for 2 x/week or a total of 10 visits over a 90 day period. Treatment will include: Manual Therapy;  Therapeutic

## (undated) NOTE — LETTER
Patient Name: Oren Vega  YOB: 1965          MRN number:  [id number]  Date:  10/26/2020  Referring Physician:  Lester Contreras      Dear Dr. Ahsan Owens,    Discharge Summary  Pt has attended 6 visits in Occupational Therapy.    Subjective: I certify the need for these services furnished under this plan of treatment and while under my care.     X___________________________________________________ Date____________________    Certification From: 09/91/9416  To:1/24/2021

## (undated) NOTE — LETTER
Patient Name: Chad Hameed  YOB: 1965          MRN number:  [id number]  Date:  12/14/2017  Referring Physician:  Kameron Austin     Progress Summary    Pt has attended 6 visits in Physical Therapy.  She reports feeling 70% improvement in neck/ar Plan: Continue skilled Physical Therapy POC 2 x/week or a total of 6 more visits over a 90 day period.      Patient/Family/Caregiver was advised of these findings, precautions, and treatment options and has agreed to actively participate in planning and for

## (undated) NOTE — LETTER
09/24/18        [place] [id number]      Dear Joanna Guzman records indicate that you have outstanding lab work and or testing that was ordered for you and has not yet been completed:  Orders Placed This Encount

## (undated) NOTE — LETTER
ASTHMA ACTION PLAN for Todd Haas     : 1965     Date: 2018  Provider:  Susan Cardenas DO  Phone for doctor or clinic: [address] [place] [address] 2, [address]  [phone]    [facility]